# Patient Record
Sex: FEMALE | Race: BLACK OR AFRICAN AMERICAN | Employment: OTHER | URBAN - METROPOLITAN AREA
[De-identification: names, ages, dates, MRNs, and addresses within clinical notes are randomized per-mention and may not be internally consistent; named-entity substitution may affect disease eponyms.]

---

## 2017-01-02 DIAGNOSIS — I10 ESSENTIAL HYPERTENSION WITH GOAL BLOOD PRESSURE LESS THAN 130/85: ICD-10-CM

## 2017-01-05 RX ORDER — HYDRALAZINE HYDROCHLORIDE 50 MG/1
TABLET, FILM COATED ORAL
Qty: 90 TAB | Refills: 0 | Status: SHIPPED | OUTPATIENT
Start: 2017-01-05 | End: 2017-02-02 | Stop reason: SDUPTHER

## 2017-01-26 ENCOUNTER — OFFICE VISIT (OUTPATIENT)
Dept: ORTHOPEDIC SURGERY | Age: 77
End: 2017-01-26

## 2017-01-26 VITALS
HEART RATE: 80 BPM | HEIGHT: 70 IN | BODY MASS INDEX: 27.92 KG/M2 | WEIGHT: 195 LBS | SYSTOLIC BLOOD PRESSURE: 161 MMHG | DIASTOLIC BLOOD PRESSURE: 71 MMHG

## 2017-01-26 DIAGNOSIS — G89.29 CHRONIC BILATERAL LOW BACK PAIN WITHOUT SCIATICA: ICD-10-CM

## 2017-01-26 DIAGNOSIS — M54.50 CHRONIC BILATERAL LOW BACK PAIN WITHOUT SCIATICA: ICD-10-CM

## 2017-01-26 DIAGNOSIS — M54.2 CERVICAL PAIN: ICD-10-CM

## 2017-01-26 DIAGNOSIS — M79.18 MYOFASCIAL PAIN: Primary | ICD-10-CM

## 2017-01-26 RX ORDER — MONTELUKAST SODIUM 10 MG/1
10 TABLET ORAL DAILY
COMMUNITY
End: 2017-09-29 | Stop reason: SDUPTHER

## 2017-01-26 RX ORDER — DICLOFENAC SODIUM 75 MG/1
75 TABLET, DELAYED RELEASE ORAL
Qty: 60 TAB | Refills: 5 | Status: SHIPPED | OUTPATIENT
Start: 2017-01-26 | End: 2019-02-19

## 2017-01-26 RX ORDER — RANITIDINE 150 MG/1
150 TABLET, FILM COATED ORAL 2 TIMES DAILY
COMMUNITY
End: 2020-05-15 | Stop reason: ALTCHOICE

## 2017-01-26 NOTE — PATIENT INSTRUCTIONS
Apiphany Activation    Thank you for requesting access to Apiphany. Please follow the instructions below to securely access and download your online medical record. Apiphany allows you to send messages to your doctor, view your test results, renew your prescriptions, schedule appointments, and more. How Do I Sign Up? 1. In your internet browser, go to [email or web address]  2. Click on the First Time User? Click Here link in the Sign In box. You will be redirect to the New Member Sign Up page. 3. Enter your Apiphany Access Code exactly as it appears below. You will not need to use this code after youve completed the sign-up process. If you do not sign up before the expiration date, you must request a new code. Apiphany Access Code: [email or web address]  Expires: 2017 10:06 AM (This is the date your Apiphany access code will )    4. Enter the last four digits of your Social Security Number (xxxx) and Date of Birth (mm/dd/yyyy) as indicated and click Submit. You will be taken to the next sign-up page. 5. Create a Apiphany ID. This will be your Apiphany login ID and cannot be changed, so think of one that is secure and easy to remember. 6. Create a Apiphany password. You can change your password at any time. 7. Enter your Password Reset Question and Answer. This can be used at a later time if you forget your password. 8. Enter your e-mail address. You will receive e-mail notification when new information is available in [address]. 9. Click Sign Up. You can now view and download portions of your medical record. 10. Click the Download Summary menu link to download a portable copy of your medical information. Additional Information    If you have questions, please visit the Frequently Asked Questions section of the Apiphany website at [email or web address] Remember, Apiphany is NOT to be used for urgent needs. For medical emergencies, dial 911.     What Is Myofascial Pain Syndrome? Myofascial pain syndrome is a chronic pain disorder that affects fascia (connective tissue that covers the muscles) and is characterized by muscle pain, tenderness, and spasm. The syndrome can involve a single muscle or a muscle group. Myofascial pain syndrome typically affects muscles in asymmetric areas of the body. The precise cause of the syndrome is unknown. As with most chronic pain conditions, associated symptoms may include poor sleep, fatigue, depression, and behavioral disturbances. In myofascial pain syndrome, there are focal tender points in muscles called trigger points. A trigger point is usually within a taut band of muscle that can be felt by the examiner. They can ultimately be identified by the examiner applying pressure with one to three fingers and the thumb. Patients usually exhibit a jump sign when a trigger point is pressed. The patient with a positive jump sign may wince, cry out, and withdraw from the pressure being applied by an examiner. The pain may be referred, or felt at a distance away from the area being compressed. Trigger points can occur in muscles, ligaments, fascia, and periosteum (the membrane surrounding a bone). Pain in trigger points can be made worse with activity or stress.  Currently, four types of trigger points can be distinguished:    -An active trigger point is an area of extreme tenderness usually within a taut muscle or muscle group  -A latent trigger point is an inactive area with the potential to act like a trigger point  -A secondary trigger point is a highly irritable area in a muscle or muscle group that can be activated due to a trigger point or overload in another muscle or muscle group  -A satellite trigger point is a highly irritable spot in a muscle or muscle group that becomes active because the muscle is in the region of another trigger point  Causes Of Myofascial Pain Syndrome    No one single factor can be identified as causing myofascial pain syndrome. The syndrome may develop from a muscle injury or excessive strain on a certain muscle or muscle group, ligament, or tendon. In addition, the following factors may be contributors:    Trauma to the discs between the backbones, or vertebrae  Inflammatory conditions  Lack of blood flow to heart muscle  Deconditioning from lack of exercise  General fatigue  Nutritional deficiencies  Hormonal changes  Obesity  Intense cooling of parts of the body  Use of tobacco  Repetitive motions  Overuse of a muscle  Alcohol or drug abuse  Long-term emotional stress  Treatments For Myofascial Pain Syndrome    Myofascial pain syndrome is best treated with a team of various medical professionals and various forms of therapies. The treatment team may consist of a primary care physician, a specialist in [address] [facility] , nurses, physical and occupational therapists, massage therapists, and psychologists. The therapies may be carried out through drug and non-drug means. Current practice is combining non-drug therapies with short-term drug therapies for longer lasting and maximal benefits. Various drugs can be used in the treatment of myofascial pain syndrome. Non-steroidal anti-inflammatory drugs (NSAIDs) such as aspirin (Emily), ibuprofen (Advil), and naproxen sodium (Aleve) can be used short term to reduce acute pain and inflammation. Acetaminophen (Tylenol) and oral narcotics such as oxycodone and hydrocodone may also be used in the short term for pain relief. Tricyclic antidepressants such as trazodone (Desyrel) and amitriptyline (Elavil) can be used at bedtime to improve sleep as well as relieve pain. Muscle relaxants such as cyclobenzaprine (Flexeril) and carisoprodol (Soma) can be used to relax muscle spasm and improve sleep, as they can have a sedating effect.     Antidepressants such as fluoxetine (Prozac), sertraline (Zoloft), and duloxetine (Cymbalta) can be helpful with the chronic pain of myofascial pain syndrome. Anti-seizure medications such as gabapentin (Neurontin) and pregabalin (Lyrica) can also be helpful with the chronic pain of the syndrome. Capsaicin cream, a topical pain reliever derived from chili peppers, may also be helpful with the chronic pain of myofascial pain syndrome. Injections can also be utilized in the treatment of myofascial pain syndrome. Trigger point injections and botulinum toxin (Botox) injections are two areas of recent interest. Trigger point injections involve direct injection of a local anesthetic into the trigger point. This method is very effective when there are only a few precisely located trigger points. Botox can also be directly injected into trigger points. This method has produced inconsistent results. Various non-drug therapies can be utilized in the treatment of myofascial pain syndrome. Physical therapy is one of the best treatments for the syndrome. The stretch and spray method has also been used with some success for treatment of the syndrome. It involves spraying the muscle or muscle group containing the trigger point with a coolant, such as flourimethane, and then slowly stretching the muscle. Massage therapy is another non-drug treatment used in the treatment of the syndrome. Massage therapists exert ischemic compression, which is the application of progressively stronger pressure on a trigger point for the purpose of eliminating its tenderness. Conclusion    Myofascial pain syndrome is a chronic pain disorder that affects muscle and the fascia covering the muscle. Key to the diagnosis and treatment of the syndrome is the identification of trigger points, which are areas of extreme tenderness in bands of taut muscle. No one knows the exact cause of the disorder. The treatment of myofascial pain syndrome is best done by a multidisciplinary team utilizing various drug and non-drug therapies.  The combination of physical therapy, trigger point injections, and massage are routinely used with some success in the treatment of myofascial pain syndrome.     [email or web address]

## 2017-01-26 NOTE — PROGRESS NOTES
[address], [address]  [place], [address] [place] [address] Street  Phone: [phone]  Fax: [phone]        Cristina Arzola  : 1940  PCP: Luis Sutherland MD  2017    NEW PATIENT      ASSESSMENT AND PLAN     Zeina Corbin comes in to the office today c/o aching 4/10 cervical and lumbar pain x 6 months. Pt states that her pain is particularly exacerbated with cold weather, sweeping, and vacuuming. While she does have diffuse arthritic changes in her cervical and lumbar regions, I believe her pain is due to postural imbalances causing stress on these areas. Pt lives in [facility] and will begin doing PT there for postural restoration. Pt was prescribed diclofenac 75mg BID prn. The risks, benefits, and potential side effects of this medication were discussed. Pt will f/u in 6 weeks or prn. Aníbal Jeffery was seen today for back pain and neck pain. Diagnoses and all orders for this visit:    Myofascial pain  -     diclofenac EC (VOLTAREN) 75 mg EC tablet; Take 1 Tab by mouth two (2) times daily as needed. -     REFERRAL TO PHYSICAL THERAPY    Cervical pain  -     [38652] C Spine 2-3V  -     diclofenac EC (VOLTAREN) 75 mg EC tablet; Take 1 Tab by mouth two (2) times daily as needed. -     REFERRAL TO PHYSICAL THERAPY    Chronic bilateral low back pain without sciatica  -     [86542] L/S 2-3 views  -     diclofenac EC (VOLTAREN) 75 mg EC tablet; Take 1 Tab by mouth two (2) times daily as needed. -     REFERRAL TO PHYSICAL THERAPY       Follow-up Disposition: Not on File    CHIEF COMPLAINT  Zeina Corbin is seen today in consultation at the request of Luis Sutherland MD for complaints of cervical and lumbar pain. HISTORY OF PRESENT ILLNESS  Zeina Corbin is a 67 y.o. female c/o aching 4/10 cervical and lumbar pain x 6 months. Pt states that her pain is particularly exacerbated with cold weather, sweeping, and vacuuming.  She has taken Tylenol and Aleve for her pain. Sitting, lying down, and bending forward relieve her pain. Pt denies any fevers, chills, nausea, vomiting. Pt denies any chest pain and shortness of breath. Pt denies any ear, nose, and throat problems. Pt denies any fecal or urinary incontinence. She is not working (retired in 2007). Pt lives in [place]. PAST MEDICAL HISTORY   Past Medical History   Diagnosis Date    Anxiety     Arthritis     GERD (gastroesophageal reflux disease)     Hypertension     Psychiatric disorder      anxiety d/o       Past Surgical History   Procedure Laterality Date    Hx gyn       hysterectomy    Hx orthopaedic       total left knee    Hx gyn       hysterectomy 1980    Hx orthopaedic       knee replacment 2007 left, 2014 right       MEDICATIONS      Current Outpatient Prescriptions   Medication Sig Dispense Refill    montelukast (SINGULAIR) 10 mg tablet Take 10 mg by mouth daily.  raNITIdine (ZANTAC) 150 mg tablet Take 150 mg by mouth two (2) times a day.  hydrALAZINE (APRESOLINE) 50 mg tablet TAKE 1 TABLET BY MOUTH THREE TIMES DAILY 90 Tab 0    losartan-hydroCHLOROthiazide (HYZAAR) 50-12.5 mg per tablet Take 1 Tab by mouth daily. 90 Tab 1    fexofenadine (ALLEGRA) 180 mg tablet TAKE 1 TABLET BY MOUTH DAILY 30 Tab 0    amLODIPine (NORVASC) 10 mg tablet Take 1 Tab by mouth daily. 90 Tab 1    LORazepam (ATIVAN) 0.5 mg tablet Take 1 Tab by mouth every eight (8) hours as needed for Anxiety for up to 10 doses. Max Daily Amount: 1.5 mg. 10 Tab 0    fluticasone (FLONASE) 50 mcg/actuation nasal spray 2 Sprays by Both Nostrils route daily. 1 Bottle 12    aspirin 81 mg chewable tablet Take 81 mg by mouth daily.  meloxicam (MOBIC) 15 mg tablet 1 tab by mouth daily with food 30 Tab 2    OTHER       esomeprazole (NEXIUM) 20 mg capsule Take  by mouth daily.          ALLERGIES    Allergies   Allergen Reactions    Hydrochlorothiazide Other (comments)       Pt denies allergy 1/6/2014 SOCIAL HISTORY    Social History     Social History    Marital status:      Spouse name: N/A    Number of children: N/A    Years of education: N/A     Social History Main Topics    Smoking status: Former Smoker     Years: 8.00     Quit date: 1/1/1990    Smokeless tobacco: Never Used    Alcohol use No    Drug use: No    Sexual activity: Not Currently     Other Topics Concern    None     Social History Narrative    ** Merged History Encounter **            FAMILY HISTORY    Family History   Problem Relation Age of Onset    Stroke Maternal Grandmother     Hypertension Mother     Stroke Mother     Hypertension Brother     Stroke Brother          REVIEW OF SYSTEMS  Review of Systems   Constitutional: Positive for chills, diaphoresis (night sweats) and fever. Negative for malaise/fatigue and weight loss. Respiratory: Negative for shortness of breath. Cardiovascular: Negative for chest pain and leg swelling. Gastrointestinal: Negative for constipation, nausea and vomiting. Neurological: Negative for dizziness, tingling, seizures, loss of consciousness and headaches. Psychiatric/Behavioral: The patient does not have insomnia. PHYSICAL EXAMINATION  Visit Vitals    /71    Pulse 80    Ht 5' 10\" (1.778 m)    Wt 195 lb (88.5 kg)    BMI 27.98 kg/m2         Pain Assessment  1/26/2017   Location of Pain Back;Neck   Location Modifiers -   Severity of Pain 0   Quality of Pain Aching   Quality of Pain Comment stgiffness   Frequency of Pain Intermittent   Aggravating Factors (No Data)   Aggravating Factors Comment inclement weather   Limiting Behavior Some   Result of Injury No         Constitutional:  Well developed, well nourished, in no acute distress. Psychiatric: Affect and mood are appropriate. HEENT: Normocephalic, atraumatic. Extraocular movements intact. Integumentary: No rashes or abrasions noted on exposed areas. Cardiovascular: Regular rate and rhythm. Pulmonary: Clear to auscultation bilaterally. SPINE/MUSCULOSKELETAL EXAM    Cervical spine:  Neck is midline. Normal muscle tone. No focal atrophy is noted. ROM pain free. Limited neck ROM. Shoulder ROM intact. No tenderness to palpation. Negative Spurling's sign. Negative Tinel's sign. Negative Castaneda's sign. Flexed forward posture. Sensation in the bilateral arms grossly intact to light touch. Lumbar spine:  No rash, ecchymosis, or gross obliquity. No fasciculations. No focal atrophy is noted. No pain with hip ROM. Full range of motion. No tenderness to palpation. No tenderness to palpation at the sciatic notch. SI joints non-tender. Trochanters non tender. No pain with back extension, flexion, twisting, or tilting. Sensation in the bilateral legs grossly intact to light touch. MOTOR:      Biceps  Triceps Deltoids Wrist Ext Wrist Flex Hand Intrin   Right 5/5 5/5 5/5 5/5 5/5 5/5   Left 5/5 5/5 5/5 5/5 5/5 5/5             Hip Flex  Quads Hamstrings Ankle DF EHL Ankle PF   Right 5/5 5/5 5/5 5/5 5/5 5/5   Left 5/5 5/5 5/5 5/5 5/5 5/5     DTRs are 2+ biceps, triceps, brachioradialis, patella, and Achilles. Negative Straight Leg raise. Squat not tested. No difficulty with tandem gait. Ambulation without assistive device. FWB. RADIOGRAPHS  Cervical XR images taken on 1/26/2017 personally reviewed with patient:  1) Severe degenerative changes  2) Significant osteophytes  3) No obvious fractures or instabilities    Lumbar XR images taken on 1/26/2017 personally reviewed with patient:  1) Severe facet sclerosis  2) No obvious fractures or instabilities      reviewed    Ms. Gage Garcia has a reminder for a \"due or due soon\" health maintenance. I have asked that she contact her primary care provider for follow-up on this health maintenance. This plan was reviewed with the patient and patient agrees. All questions were answered.  More than half of this visit today was spent on counseling. Written by Lenin Alberts, as dictated by Dr. Ailin Olvera. I, Dr. Ailin Olvera, confirm that all documentation is accurate.

## 2017-02-02 DIAGNOSIS — I10 ESSENTIAL HYPERTENSION WITH GOAL BLOOD PRESSURE LESS THAN 130/85: ICD-10-CM

## 2017-02-02 RX ORDER — HYDRALAZINE HYDROCHLORIDE 50 MG/1
TABLET, FILM COATED ORAL
Qty: 90 TAB | Refills: 0 | Status: SHIPPED | OUTPATIENT
Start: 2017-02-02 | End: 2017-02-15 | Stop reason: SDUPTHER

## 2017-02-15 ENCOUNTER — HOSPITAL ENCOUNTER (OUTPATIENT)
Dept: LAB | Age: 77
Discharge: HOME | End: 2017-02-15
Payer: MEDICARE

## 2017-02-15 ENCOUNTER — OFFICE VISIT (OUTPATIENT)
Dept: INTERNAL MEDICINE CLINIC | Age: 77
End: 2017-02-15

## 2017-02-15 VITALS
BODY MASS INDEX: 28.06 KG/M2 | SYSTOLIC BLOOD PRESSURE: 140 MMHG | WEIGHT: 196 LBS | TEMPERATURE: 97.6 F | OXYGEN SATURATION: 98 % | HEART RATE: 78 BPM | RESPIRATION RATE: 18 BRPM | DIASTOLIC BLOOD PRESSURE: 78 MMHG | HEIGHT: 70 IN

## 2017-02-15 DIAGNOSIS — F41.9 ANXIETY: ICD-10-CM

## 2017-02-15 DIAGNOSIS — J30.89 ENVIRONMENTAL AND SEASONAL ALLERGIES: ICD-10-CM

## 2017-02-15 DIAGNOSIS — I10 ESSENTIAL HYPERTENSION WITH GOAL BLOOD PRESSURE LESS THAN 130/85: Primary | ICD-10-CM

## 2017-02-15 DIAGNOSIS — Z00.00 MEDICARE ANNUAL WELLNESS VISIT, SUBSEQUENT: ICD-10-CM

## 2017-02-15 DIAGNOSIS — E55.9 VITAMIN D DEFICIENCY: ICD-10-CM

## 2017-02-15 PROCEDURE — 85027 COMPLETE CBC AUTOMATED: CPT

## 2017-02-15 PROCEDURE — 36415 COLL VENOUS BLD VENIPUNCTURE: CPT

## 2017-02-15 PROCEDURE — 80053 COMPREHEN METABOLIC PANEL: CPT

## 2017-02-15 PROCEDURE — 82306 VITAMIN D 25 HYDROXY: CPT

## 2017-02-15 RX ORDER — AMLODIPINE BESYLATE 10 MG/1
10 TABLET ORAL DAILY
Qty: 90 TAB | Refills: 1 | Status: SHIPPED | OUTPATIENT
Start: 2017-02-15 | End: 2017-03-31 | Stop reason: SDUPTHER

## 2017-02-15 RX ORDER — HYDRALAZINE HYDROCHLORIDE 50 MG/1
TABLET, FILM COATED ORAL
Qty: 90 TAB | Refills: 1 | Status: SHIPPED | OUTPATIENT
Start: 2017-02-15 | End: 2017-04-11 | Stop reason: SDUPTHER

## 2017-02-15 RX ORDER — MINERAL OIL
ENEMA (ML) RECTAL
Qty: 90 TAB | Refills: 3 | Status: SHIPPED | OUTPATIENT
Start: 2017-02-15 | End: 2017-09-29 | Stop reason: ALTCHOICE

## 2017-02-15 RX ORDER — LORAZEPAM 0.5 MG/1
0.5 TABLET ORAL
Qty: 20 TAB | Refills: 0 | Status: SHIPPED | OUTPATIENT
Start: 2017-02-15 | End: 2017-06-21 | Stop reason: SDUPTHER

## 2017-02-15 NOTE — LETTER
2/20/2017 4:16 PM 
 
Ms. Jarocho Valdivia [facility] [id number] Dear Jarocho Valdivia: 
 
Please find your most recent results below. Resulted Orders CBC W/O DIFF Result Value Ref Range WBC 4.3 3.4 - 10.8 x10E3/uL  
 RBC 4.38 3.77 - 5.28 x10E6/uL HGB 13.2 11.1 - 15.9 g/dL HCT 40.5 34.0 - 46.6 % MCV 93 79 - 97 fL  
 MCH 30.1 26.6 - 33.0 pg  
 MCHC 32.6 31.5 - 35.7 g/dL  
 RDW 13.1 12.3 - 15.4 % PLATELET 583 982 - 861 x10E3/uL Narrative Performed at:  [address]  [id number] : Nathalie Mansfield MD, Phone:  [phone] METABOLIC PANEL, COMPREHENSIVE Result Value Ref Range Glucose 82 65 - 99 mg/dL BUN 13 8 - 27 mg/dL Creatinine 0.79 0.57 - 1.00 mg/dL GFR est non-AA 72 >59 mL/min/1.73 GFR est AA 84 >59 mL/min/1.73  
 BUN/Creatinine ratio 16 11 - 26 Sodium 142 134 - 144 mmol/L Potassium 4.3 3.5 - 5.2 mmol/L Chloride 99 96 - 106 mmol/L  
 CO2 28 18 - 29 mmol/L Calcium 9.7 8.7 - 10.3 mg/dL Protein, total 6.9 6.0 - 8.5 g/dL Albumin 4.3 3.5 - 4.8 g/dL GLOBULIN, TOTAL 2.6 1.5 - 4.5 g/dL A-G Ratio 1.7 1.1 - 2.5 Comment: **Effective March 13, 2017 the reference interval** 
  for A/G Ratio will be changing to: Age                Male          Female 0 -  7 days       1.1 - 2.3       1.1 - 2.3 
          8 - 30 days       1.2 - 2.8       1.2 - 2.8 
          1 -  6 months     1.3 - 3.6       1.3 - 3.6 
   7 months -  5 years      1.5 - 2.6       1.5 - 2.6 
             > 5 years      1.2 - 2.2       1.2 - 2.2 Bilirubin, total 0.3 0.0 - 1.2 mg/dL Alk. phosphatase 66 39 - 117 IU/L  
 AST (SGOT) 20 0 - 40 IU/L  
 ALT (SGPT) 12 0 - 32 IU/L Narrative Performed at:  [address]  [id number] : Nathalie Mansfield MD, Phone:  [phone] VITAMIN D, 25 HYDROXY Result Value Ref Range VITAMIN D, 25-HYDROXY 25.9 (L) 30.0 - 100.0 ng/mL Comment:  
   Vitamin D deficiency has been defined by the [facility] practice guideline as a 
level of serum 25-OH vitamin D less than 20 ng/mL (1,2). The Endocrine Society went on to further define vitamin D 
insufficiency as a level between 21 and 29 ng/mL (2). 1. IOM ([place] of Medicine). 2010. Dietary reference 
   intakes for calcium and D. [facility]: The 
   [employer]. 2. Rishabh MF, Alejandro NC, Haven HUNT, et al. 
   Evaluation, treatment, and prevention of vitamin D 
   deficiency: an Endocrine Society clinical practice 
   guideline. JCEM. 2011 Jul; 96(7):1911-30. Narrative Performed at:  [address]  [id number] : Asael Hankins MD, Phone:  [phone] RECOMMENDATIONS: 
Vit d is low, please start taking over the counter Vit d 1,000 iu daily. We can recheck this at next follow up. Every thing else looks fine. Follow up as instructed. Please call me if you have any questions: [phone] Sincerely, Annie Chanel MD

## 2017-02-15 NOTE — PROGRESS NOTES
Chief Complaint   Patient presents with    Annual Wellness Visit     fasting medicare wellness           Eleni President is a 67 y.o. female and presents for Annual Medicare Wellness Visit. She has been doing well since last seen in the office. She is getting Allergy shots from [facility], feeling much better. Now scheduling PT for lower back. Assessment of cognitive impairment: Alert and oriented x 3. Depression Screen:   PHQ 2 / 9, over the last two weeks 9/2/2016   Little interest or pleasure in doing things Not at all   Feeling down, depressed or hopeless Not at all   Total Score PHQ 2 0       Fall Risk Assessment:    Fall Risk Assessment, last 12 mths 9/2/2016   Able to walk? Yes   Fall in past 12 months? No       Abuse Screen:   Abuse Screening Questionnaire 9/2/2016   Do you ever feel afraid of your partner? N   Are you in a relationship with someone who physically or mentally threatens you? N   Is it safe for you to go home? Y       Activities of Daily Living:  Self-care.    Requires assistance with: no ADLs  Patient handle his/her own medications  yes Use of pill box  no  Activities of Daily Living:   ADL Assessment 9/2/2016   Feeding yourself No Help Needed   Getting from bed to chair No Help Needed   Getting dressed No Help Needed   Bathing or showering No Help Needed   Walk across the room (includes cane/walker) No Help Needed   Using the telphone No Help Needed   Taking your medications No Help Needed   Preparing meals No Help Needed   Managing money (expenses/bills) No Help Needed   Moderately strenuous housework (laundry) No Help Needed   Shopping for personal items (toiletries/medicines) No Help Needed   Shopping for groceries No Help Needed   Driving No Help Needed   Climbing a flight of stairs No Help Needed   Getting to places beyond walking distances No Help Needed       Health Maintenance:  Daily Aspirin: yes  Bone Density: up to date  Glaucoma Screening: yes  Immunizations:    Tetanus: up to date. Influenza: up to date. Shingles: up to date. PPSV-23: up to date. Prevnar-13: got in 12/16. Cancer screening:    Cervical: not up to date - n/a. Breast: up to date. Colon: no need, was told no need to do anymore. Prostate:  N/a      Alcohol Risk Screen:   On any occasion during the past 3 months, have you had more than 3 drinks(female) or 4 drinks (male) containing alcohol in one? No  Do you average more than 7 drinks (female) or 14 drinks (male) per week? No  Type and amount:none    Hearing Loss:  none    Vision Loss:   Wears glasses, contact lenses, or have any other visual impairment  yes    Adult Nutrition Screen:  No risk factors noted. Advance Care Planning:   End of Life Planning: has NO advanced directive  - add't info provided  Sarkis Gill ACP-Facilitator appointment yes      Medications/Allergies: Reviewed with patient  Prior to Admission medications    Medication Sig Start Date End Date Taking? Authorizing Provider   hydrALAZINE (APRESOLINE) 50 mg tablet TAKE 1 TABLET BY MOUTH THREE TIMES DAILY 2/2/17  Yes Concepción Rolle NP   montelukast (SINGULAIR) 10 mg tablet Take 10 mg by mouth daily. Yes Historical Provider   raNITIdine (ZANTAC) 150 mg tablet Take 150 mg by mouth two (2) times a day. Yes Historical Provider   diclofenac EC (VOLTAREN) 75 mg EC tablet Take 1 Tab by mouth two (2) times daily as needed. 1/26/17  Yes Levar Vogel MD   losartan-hydroCHLOROthiazide [facility]) 50-12.5 mg per tablet Take 1 Tab by mouth daily. 12/7/16  Yes Concepción Rolle NP   fexofenadine (ALLEGRA) 180 mg tablet TAKE 1 TABLET BY MOUTH DAILY 10/20/16  Yes Concepción Rolle NP   meloxicam (MOBIC) 15 mg tablet 1 tab by mouth daily with food 10/6/16  Yes Sameera Martinez MD   amLODIPine (NORVASC) 10 mg tablet Take 1 Tab by mouth daily. 9/27/16  Yes Lion Moe MD   LORazepam (ATIVAN) 0.5 mg tablet Take 1 Tab by mouth every eight (8) hours as needed for Anxiety for up to 10 doses. Max Daily Amount: 1.5 mg. 9/2/16  Yes Aaron Yancey MD   fluticasone (FLONASE) 50 mcg/actuation nasal spray 2 Sprays by Both Nostrils route daily. 2/19/16  Yes Mandie Jones NP   aspirin 81 mg chewable tablet Take 81 mg by mouth daily. Yes Historical Provider   esomeprazole (NEXIUM) 20 mg capsule Take  by mouth daily. Yes Historical Provider       Allergies   Allergen Reactions    Hydrochlorothiazide Other (comments)       Pt denies allergy 1/6/2014       Objective:  B.p 140/78  78 18 96.6    Problem List: Reviewed with patient and discussed risk factors. Patient Active Problem List   Diagnosis Code    OA (osteoarthritis) M19.90    Anxiety F41.9    HTN (hypertension) I10    Allergic rhinitis X06.0    Systolic murmur A02.9    Q waves suggestive of previous myocardial infarction R94.31    Essential hypertension I10    Arthritis M19.90    Gastroesophageal reflux disease without esophagitis K21.9    Anxiety disorder F41.9       PSH: Reviewed with patient  Past Surgical History   Procedure Laterality Date    Hx gyn       hysterectomy    Hx orthopaedic       total left knee    Hx gyn       hysterectomy 1980    Hx orthopaedic       knee replacment 2007 left, 2014 right        SH: Reviewed with patient  Social History   Substance Use Topics    Smoking status: Former Smoker     Years: 8.00     Quit date: 1/1/1990    Smokeless tobacco: Never Used    Alcohol use No       FH: Reviewed with patient  Family History   Problem Relation Age of Onset    Stroke Maternal Grandmother     Hypertension Mother     Stroke Mother     Hypertension Brother     Stroke Brother        Current medical providers:    Patient Care Team:  Aaron Yancey MD as PCP - General (Internal Medicine)  Gricelda Bowers MD as Consulting Provider (Orthopedic Surgery)  Kay Ortiz MD (Family Practice)    Plan: Brannon Moseley was seen today for annual wellness visit.     Diagnoses and all orders for this visit:    Medicare annual wellness visit, subsequent    Essential hypertension with goal blood pressure less than 130/85  -     hydrALAZINE (APRESOLINE) 50 mg tablet; TAKE 1 TABLET BY MOUTH THREE TIMES DAILY  -     amLODIPine (NORVASC) 10 mg tablet; Take 1 Tab by mouth daily.  -     CBC W/O DIFF  -     METABOLIC PANEL, COMPREHENSIVE    Anxiety  -     LORazepam (ATIVAN) 0.5 mg tablet; Take 1 Tab by mouth every eight (8) hours as needed for Anxiety for up to 20 doses. Max Daily Amount: 1.5 mg. Environmental and seasonal allergies  -     fexofenadine (ALLEGRA) 180 mg tablet; TAKE 1 TABLET BY MOUTH DAILY    Vitamin D deficiency  -     VITAMIN D, 25 HYDROXY        Health Maintenance   Topic Date Due    DTaP/Tdap/Td series (1 - Tdap) 04/02/1960    ZOSTER VACCINE AGE 60>  04/02/1999    GLAUCOMA SCREENING Q2Y  04/02/2004    Pneumococcal 65+ Low/Medium Risk (1 of 2 - PCV13) 04/02/2004    INFLUENZA AGE 9 TO ADULT  08/01/2016    MEDICARE YEARLY EXAM  03/16/2017    OSTEOPOROSIS SCREENING (DEXA)  Completed          Urinary/ Fecal Incontinence: None. Regular physical exercise: Can`t walk much due to low back pain. She will start walking daily after a physical therapy.

## 2017-02-16 LAB
25(OH)D3+25(OH)D2 SERPL-MCNC: 25.9 NG/ML (ref 30–100)
ALBUMIN SERPL-MCNC: 4.3 G/DL (ref 3.5–4.8)
ALBUMIN/GLOB SERPL: 1.7 {RATIO} (ref 1.1–2.5)
ALP SERPL-CCNC: 66 IU/L (ref 39–117)
ALT SERPL-CCNC: 12 IU/L (ref 0–32)
AST SERPL-CCNC: 20 IU/L (ref 0–40)
BILIRUB SERPL-MCNC: 0.3 MG/DL (ref 0–1.2)
BUN SERPL-MCNC: 13 MG/DL (ref 8–27)
BUN/CREAT SERPL: 16 (ref 11–26)
CALCIUM SERPL-MCNC: 9.7 MG/DL (ref 8.7–10.3)
CHLORIDE SERPL-SCNC: 99 MMOL/L (ref 96–106)
CO2 SERPL-SCNC: 28 MMOL/L (ref 18–29)
CREAT SERPL-MCNC: 0.79 MG/DL (ref 0.57–1)
ERYTHROCYTE [DISTWIDTH] IN BLOOD BY AUTOMATED COUNT: 13.1 % (ref 12.3–15.4)
GLOBULIN SER CALC-MCNC: 2.6 G/DL (ref 1.5–4.5)
GLUCOSE SERPL-MCNC: 82 MG/DL (ref 65–99)
HCT VFR BLD AUTO: 40.5 % (ref 34–46.6)
HGB BLD-MCNC: 13.2 G/DL (ref 11.1–15.9)
MCH RBC QN AUTO: 30.1 PG (ref 26.6–33)
MCHC RBC AUTO-ENTMCNC: 32.6 G/DL (ref 31.5–35.7)
MCV RBC AUTO: 93 FL (ref 79–97)
PLATELET # BLD AUTO: 238 X10E3/UL (ref 150–379)
POTASSIUM SERPL-SCNC: 4.3 MMOL/L (ref 3.5–5.2)
PROT SERPL-MCNC: 6.9 G/DL (ref 6–8.5)
RBC # BLD AUTO: 4.38 X10E6/UL (ref 3.77–5.28)
SODIUM SERPL-SCNC: 142 MMOL/L (ref 134–144)
WBC # BLD AUTO: 4.3 X10E3/UL (ref 3.4–10.8)

## 2017-03-04 DIAGNOSIS — J30.9 ALLERGIC RHINITIS: ICD-10-CM

## 2017-03-05 DIAGNOSIS — I10 ESSENTIAL HYPERTENSION WITH GOAL BLOOD PRESSURE LESS THAN 130/85: ICD-10-CM

## 2017-03-05 RX ORDER — HYDRALAZINE HYDROCHLORIDE 50 MG/1
TABLET, FILM COATED ORAL
Qty: 90 TAB | Refills: 0 | Status: SHIPPED | OUTPATIENT
Start: 2017-03-05 | End: 2017-06-21 | Stop reason: SDUPTHER

## 2017-03-05 RX ORDER — FLUTICASONE PROPIONATE 50 MCG
SPRAY, SUSPENSION (ML) NASAL
Qty: 1 BOTTLE | Refills: 2 | Status: SHIPPED | OUTPATIENT
Start: 2017-03-05 | End: 2017-06-23 | Stop reason: SDUPTHER

## 2017-03-31 DIAGNOSIS — I10 ESSENTIAL HYPERTENSION WITH GOAL BLOOD PRESSURE LESS THAN 130/85: ICD-10-CM

## 2017-03-31 RX ORDER — AMLODIPINE BESYLATE 10 MG/1
10 TABLET ORAL DAILY
Qty: 90 TAB | Refills: 1 | Status: SHIPPED | OUTPATIENT
Start: 2017-03-31 | End: 2017-09-29 | Stop reason: SDUPTHER

## 2017-04-11 DIAGNOSIS — I10 ESSENTIAL HYPERTENSION WITH GOAL BLOOD PRESSURE LESS THAN 130/85: ICD-10-CM

## 2017-04-11 RX ORDER — HYDRALAZINE HYDROCHLORIDE 50 MG/1
TABLET, FILM COATED ORAL
Qty: 90 TAB | Refills: 1 | Status: SHIPPED | OUTPATIENT
Start: 2017-04-11 | End: 2017-06-16 | Stop reason: SDUPTHER

## 2017-04-27 DIAGNOSIS — H10.13 ALLERGIC CONJUNCTIVITIS, BILATERAL: Primary | ICD-10-CM

## 2017-04-27 RX ORDER — KETOTIFEN FUMARATE 0.35 MG/ML
1 SOLUTION/ DROPS OPHTHALMIC 2 TIMES DAILY
Qty: 10 ML | Refills: 1 | Status: SHIPPED | OUTPATIENT
Start: 2017-04-27 | End: 2017-05-07

## 2017-05-02 ENCOUNTER — DOCUMENTATION ONLY (OUTPATIENT)
Dept: INTERNAL MEDICINE CLINIC | Age: 77
End: 2017-05-02

## 2017-06-05 RX ORDER — LOSARTAN POTASSIUM AND HYDROCHLOROTHIAZIDE 12.5; 5 MG/1; MG/1
TABLET ORAL
Qty: 90 TAB | Refills: 0 | Status: SHIPPED | OUTPATIENT
Start: 2017-06-05 | End: 2017-08-23 | Stop reason: SDUPTHER

## 2017-06-16 DIAGNOSIS — I10 ESSENTIAL HYPERTENSION WITH GOAL BLOOD PRESSURE LESS THAN 130/85: ICD-10-CM

## 2017-06-16 RX ORDER — HYDRALAZINE HYDROCHLORIDE 50 MG/1
TABLET, FILM COATED ORAL
Qty: 90 TAB | Refills: 0 | Status: SHIPPED | OUTPATIENT
Start: 2017-06-16 | End: 2018-01-23 | Stop reason: SDUPTHER

## 2017-06-21 DIAGNOSIS — I10 ESSENTIAL HYPERTENSION WITH GOAL BLOOD PRESSURE LESS THAN 130/85: ICD-10-CM

## 2017-06-21 DIAGNOSIS — F41.9 ANXIETY: ICD-10-CM

## 2017-06-23 DIAGNOSIS — F41.9 ANXIETY: ICD-10-CM

## 2017-06-23 DIAGNOSIS — J30.9 ALLERGIC RHINITIS: ICD-10-CM

## 2017-06-23 DIAGNOSIS — I10 ESSENTIAL HYPERTENSION WITH GOAL BLOOD PRESSURE LESS THAN 130/85: ICD-10-CM

## 2017-06-23 RX ORDER — HYDRALAZINE HYDROCHLORIDE 50 MG/1
TABLET, FILM COATED ORAL
Qty: 90 TAB | Refills: 0 | Status: SHIPPED | OUTPATIENT
Start: 2017-06-23 | End: 2017-09-29 | Stop reason: ALTCHOICE

## 2017-06-23 RX ORDER — LORAZEPAM 0.5 MG/1
0.5 TABLET ORAL
Qty: 20 TAB | Refills: 0 | Status: SHIPPED | OUTPATIENT
Start: 2017-06-23 | End: 2017-06-23 | Stop reason: SDUPTHER

## 2017-06-23 RX ORDER — LORAZEPAM 0.5 MG/1
TABLET ORAL
Qty: 20 TAB | Refills: 0 | Status: SHIPPED | OUTPATIENT
Start: 2017-06-23 | End: 2017-09-29 | Stop reason: SDUPTHER

## 2017-06-23 RX ORDER — HYDRALAZINE HYDROCHLORIDE 50 MG/1
TABLET, FILM COATED ORAL
Qty: 270 TAB | Refills: 1 | Status: SHIPPED | OUTPATIENT
Start: 2017-06-23 | End: 2017-09-29 | Stop reason: ALTCHOICE

## 2017-06-23 RX ORDER — FLUTICASONE PROPIONATE 50 MCG
SPRAY, SUSPENSION (ML) NASAL
Qty: 1 BOTTLE | Refills: 2 | Status: SHIPPED | OUTPATIENT
Start: 2017-06-23 | End: 2017-09-07 | Stop reason: SDUPTHER

## 2017-08-24 RX ORDER — LOSARTAN POTASSIUM AND HYDROCHLOROTHIAZIDE 12.5; 5 MG/1; MG/1
TABLET ORAL
Qty: 90 TAB | Refills: 0 | Status: SHIPPED | OUTPATIENT
Start: 2017-08-24 | End: 2017-11-18 | Stop reason: SDUPTHER

## 2017-09-07 DIAGNOSIS — J30.9 ALLERGIC RHINITIS, UNSPECIFIED ALLERGIC RHINITIS TRIGGER, UNSPECIFIED RHINITIS SEASONALITY: ICD-10-CM

## 2017-09-08 RX ORDER — FLUTICASONE PROPIONATE 50 MCG
SPRAY, SUSPENSION (ML) NASAL
Qty: 1 BOTTLE | Refills: 2 | Status: SHIPPED | OUTPATIENT
Start: 2017-09-08 | End: 2017-11-29 | Stop reason: SDUPTHER

## 2017-09-22 DIAGNOSIS — I10 ESSENTIAL HYPERTENSION WITH GOAL BLOOD PRESSURE LESS THAN 130/85: ICD-10-CM

## 2017-09-22 RX ORDER — AMLODIPINE BESYLATE 10 MG/1
TABLET ORAL
Qty: 90 TAB | Refills: 0 | Status: SHIPPED | OUTPATIENT
Start: 2017-09-22 | End: 2017-09-29 | Stop reason: ALTCHOICE

## 2017-09-29 ENCOUNTER — OFFICE VISIT (OUTPATIENT)
Dept: INTERNAL MEDICINE CLINIC | Age: 77
End: 2017-09-29

## 2017-09-29 ENCOUNTER — HOSPITAL ENCOUNTER (OUTPATIENT)
Dept: LAB | Age: 77
Discharge: HOME | End: 2017-09-29
Payer: MEDICARE

## 2017-09-29 VITALS
DIASTOLIC BLOOD PRESSURE: 80 MMHG | TEMPERATURE: 98 F | OXYGEN SATURATION: 96 % | HEART RATE: 90 BPM | BODY MASS INDEX: 28.15 KG/M2 | SYSTOLIC BLOOD PRESSURE: 140 MMHG | RESPIRATION RATE: 15 BRPM | WEIGHT: 196.6 LBS | HEIGHT: 70 IN

## 2017-09-29 DIAGNOSIS — I10 ESSENTIAL HYPERTENSION WITH GOAL BLOOD PRESSURE LESS THAN 130/85: ICD-10-CM

## 2017-09-29 DIAGNOSIS — F41.9 ANXIETY: ICD-10-CM

## 2017-09-29 DIAGNOSIS — J98.01 BRONCHOSPASM: ICD-10-CM

## 2017-09-29 DIAGNOSIS — M54.50 ACUTE MIDLINE LOW BACK PAIN WITHOUT SCIATICA: Primary | ICD-10-CM

## 2017-09-29 DIAGNOSIS — J30.89 ENVIRONMENTAL AND SEASONAL ALLERGIES: ICD-10-CM

## 2017-09-29 LAB
BILIRUB UR QL STRIP: NEGATIVE
GLUCOSE UR-MCNC: NEGATIVE MG/DL
KETONES P FAST UR STRIP-MCNC: NEGATIVE MG/DL
PH UR STRIP: 7 [PH] (ref 4.6–8)
PROT UR QL STRIP: NEGATIVE MG/DL
SP GR UR STRIP: 1.01 (ref 1–1.03)
UA UROBILINOGEN AMB POC: NORMAL (ref 0.2–1)
URINALYSIS CLARITY POC: CLEAR
URINALYSIS COLOR POC: YELLOW
URINE BLOOD POC: NEGATIVE
URINE LEUKOCYTES POC: NORMAL
URINE NITRITES POC: NEGATIVE

## 2017-09-29 PROCEDURE — 87086 URINE CULTURE/COLONY COUNT: CPT

## 2017-09-29 RX ORDER — LORAZEPAM 0.5 MG/1
TABLET ORAL
Qty: 20 TAB | Refills: 0 | Status: SHIPPED | OUTPATIENT
Start: 2017-09-29 | End: 2017-12-12 | Stop reason: SDUPTHER

## 2017-09-29 RX ORDER — AMLODIPINE BESYLATE 10 MG/1
10 TABLET ORAL DAILY
Qty: 90 TAB | Refills: 1 | Status: SHIPPED | OUTPATIENT
Start: 2017-09-29 | End: 2018-12-13 | Stop reason: SDUPTHER

## 2017-09-29 RX ORDER — MONTELUKAST SODIUM 10 MG/1
10 TABLET ORAL DAILY
Qty: 30 TAB | Refills: 0 | Status: SHIPPED | OUTPATIENT
Start: 2017-09-29 | End: 2017-10-27 | Stop reason: SDUPTHER

## 2017-09-29 RX ORDER — ALBUTEROL SULFATE 90 UG/1
1 AEROSOL, METERED RESPIRATORY (INHALATION)
Qty: 1 INHALER | Refills: 0 | Status: SHIPPED | OUTPATIENT
Start: 2017-09-29 | End: 2017-10-22 | Stop reason: SDUPTHER

## 2017-09-29 NOTE — PROGRESS NOTES
Chief Complaint   Patient presents with    Allergies     c/o earache, sore throat     Back Pain     lower back pain that has been occuring for about the last week   Pt here for allergy symptoms has been having a runny nose, ear pain, a sore throat with some coughing. She also states that she has been having some back pain and would like to get checked for a UTI since she had some concern that it could be that. Visit Vitals    /80 (BP 1 Location: Left arm, BP Patient Position: Sitting)    Pulse 90    Temp 98 °F (36.7 °C) (Oral)    Resp 15    Ht 5' 10\" (1.778 m)    Wt 196 lb 9.6 oz (89.2 kg)    SpO2 96%    BMI 28.21 kg/m2      1. Have you been to the ER, urgent care clinic since your last visit? Hospitalized since your last visit? No    2. Have you seen or consulted any other health care providers outside of the [address] [place] [address] since your last visit? Include any pap smears or colon screening.  No

## 2017-09-29 NOTE — LETTER
10/2/2017 2:34 PM 
 
Ms. Marty Acosta [facility] [id number] Dear Marty Acosta: 
 
Please find your most recent results below. Resulted Orders AMB POC URINALYSIS DIP STICK AUTO W/O MICRO Result Value Ref Range Color (UA POC) Yellow Clarity (UA POC) Clear Glucose (UA POC) Negative Negative Bilirubin (UA POC) Negative Negative Ketones (UA POC) Negative Negative Specific gravity (UA POC) 1.015 1.001 - 1.035 Blood (UA POC) Negative Negative pH (UA POC) 7.0 4.6 - 8.0 Protein (UA POC) Negative Negative mg/dL Urobilinogen (UA POC) 0.2 mg/dL 0.2 - 1 Nitrites (UA POC) Negative Negative Leukocyte esterase (UA POC) Trace Negative CULTURE, URINE Result Value Ref Range Urine Culture, Routine Mixed urogenital los Less than 10,000 colonies/mL Narrative Performed at:  [address]  [id number] : Isha Caldwell MD, Phone:  [phone] RECOMMENDATIONS: 
Negative culture. NO urinary infection. Please call me if you have any questions: [phone] Sincerely, Yates Mcburney, MD

## 2017-09-29 NOTE — PROGRESS NOTES
Written by Sonam Quintana, as dictated by Dr. Merryl Bence, MD.    Odette Villalpando is a 65 y.o. female. HPI  The patient comes in today to discuss allergies, which she has experienced since moving to [place] 3 years ago. She is allergic to ragweed. She used to follow with Dr. Duran (allergy) but she stopped going, as she was going twice per week and did not want to do that long-term. She takes Allegra every day for her allergies but no longer takes Singulair. She has been eating 2 tbsp of honey to help with her allergies, which she thinks has been helping. She has been experiencing lower back pain and was experiencing burning with urination recently, though that sensation has since subsided. In the past when she had a UTI it started as lower back pain, so she would like to have her urine checked today. Her BP is elevated in the office today at 140/80. She has been checking at home and says it has been running 135-140. She is compliant on Norvasc in the morning, hydralazine TID, and losartan-hctz. Patient Active Problem List   Diagnosis Code    OA (osteoarthritis) M19.90    Anxiety F41.9    HTN (hypertension) I10    Allergic rhinitis Z16.3    Systolic murmur U82.8    Q waves suggestive of previous myocardial infarction R94.31    Essential hypertension I10    Arthritis M19.90    Gastroesophageal reflux disease without esophagitis K21.9    Anxiety disorder F41.9        Current Outpatient Prescriptions on File Prior to Visit   Medication Sig Dispense Refill    fluticasone (FLONASE) 50 mcg/actuation nasal spray INSTILL 2 SPRAYS IN EACH NOSTRIL EVERY DAY 1 Bottle 2    losartan-hydroCHLOROthiazide (HYZAAR) 50-12.5 mg per tablet TAKE 1 TABLET BY MOUTH DAILY 90 Tab 0    hydrALAZINE (APRESOLINE) 50 mg tablet TAKE 1 TABLET BY MOUTH THREE TIMES DAILY 90 Tab 0    raNITIdine (ZANTAC) 150 mg tablet Take 150 mg by mouth two (2) times a day.       diclofenac EC (VOLTAREN) 75 mg EC tablet Take 1 Tab by mouth two (2) times daily as needed. 60 Tab 5    aspirin 81 mg chewable tablet Take 81 mg by mouth daily. No current facility-administered medications on file prior to visit. Allergies   Allergen Reactions    Hydrochlorothiazide Other (comments)       Pt denies allergy 1/6/2014       Past Medical History:   Diagnosis Date    Anxiety     Arthritis     GERD (gastroesophageal reflux disease)     Hypertension     Psychiatric disorder     anxiety d/o       Past Surgical History:   Procedure Laterality Date    HX GYN      hysterectomy    HX GYN      hysterectomy 1980    HX ORTHOPAEDIC      total left knee    HX ORTHOPAEDIC      knee replacment 2007 left, 2014 right       Family History   Problem Relation Age of Onset    Stroke Maternal Grandmother     Hypertension Mother     Stroke Mother     Hypertension Brother     Stroke Brother        Social History     Social History    Marital status: UNKNOWN     Spouse name: N/A    Number of children: N/A    Years of education: N/A     Occupational History    Not on file.      Social History Main Topics    Smoking status: Former Smoker     Years: 8.00     Quit date: 1/1/1990    Smokeless tobacco: Never Used    Alcohol use No    Drug use: No    Sexual activity: Not Currently     Other Topics Concern    Not on file     Social History Narrative    ** Merged History Encounter **            Office Visit on 09/29/2017   Component Date Value Ref Range Status    Color (UA POC) 09/29/2017 Yellow   Final    Clarity (UA POC) 09/29/2017 Clear   Final    Glucose (UA POC) 09/29/2017 Negative  Negative Final    Bilirubin (UA POC) 09/29/2017 Negative  Negative Final    Ketones (UA POC) 09/29/2017 Negative  Negative Final    Specific gravity (UA POC) 09/29/2017 1.015  1.001 - 1.035 Final    Blood (UA POC) 09/29/2017 Negative  Negative Final    pH (UA POC) 09/29/2017 7.0  4.6 - 8.0 Final    Protein (UA POC) 09/29/2017 Negative  Negative mg/dL Final    Urobilinogen (UA POC) 09/29/2017 0.2 mg/dL  0.2 - 1 Final    Nitrites (UA POC) 09/29/2017 Negative  Negative Final    Leukocyte esterase (UA POC) 09/29/2017 Trace  Negative Final       Review of Systems   Constitutional: Negative for malaise/fatigue. HENT: Positive for congestion. Respiratory: Negative for cough and shortness of breath. Musculoskeletal: Negative for joint pain and myalgias. Neurological: Negative for weakness. Endo/Heme/Allergies: Positive for environmental allergies. Psychiatric/Behavioral: Negative for depression, memory loss and substance abuse. The patient is nervous/anxious. Visit Vitals    /80 (BP 1 Location: Left arm, BP Patient Position: Sitting)    Pulse 90    Temp 98 °F (36.7 °C) (Oral)    Resp 15    Ht 5' 10\" (1.778 m)    Wt 196 lb 9.6 oz (89.2 kg)    SpO2 96%    BMI 28.21 kg/m2       Physical Exam   Constitutional: She is oriented to person, place, and time. She appears well-developed and well-nourished. No distress. HENT:   Right Ear: External ear normal.   Left Ear: External ear normal.   Eyes: Conjunctivae and EOM are normal. Right eye exhibits no discharge. Left eye exhibits no discharge. Neck: Normal range of motion. Neck supple. Cardiovascular: Normal rate and regular rhythm. Pulmonary/Chest: Effort normal and breath sounds normal. She has no wheezes. Scattered rhonchi and decreased breath sounds on R lower lobe   Abdominal: Soft. Bowel sounds are normal. There is no tenderness. Lymphadenopathy:     She has no cervical adenopathy. Neurological: She is alert and oriented to person, place, and time. Skin: She is not diaphoretic. Psychiatric: She has a normal mood and affect. Her behavior is normal.   Nursing note and vitals reviewed. ASSESSMENT and PLAN    ICD-10-CM ICD-9-CM    1.  Acute midline low back pain without sciatica M54.5 724.2 AMB POC URINALYSIS DIP STICK AUTO W/O MICRO CULTURE, URINE    UA performed in office, trace leukocytes. She is not having any burning with urination or frequency, so will send out for urine culture before starting on any abx.   2. Essential hypertension with goal blood pressure less than 130/85 I10 401.9 amLODIPine (NORVASC) 10 mg tablet sent to pharmacy    Norvasc refilled. If her BP goes above 140/80 she should let me know. 3. Anxiety F41.9 300.00 LORazepam (ATIVAN) 0.5 mg tablet script given to patient    Ativan given to use prn.   4. Environmental and seasonal allergies J30.89 477.8 montelukast (SINGULAIR) 10 mg tablet sent to pharmacy      albuterol (PROVENTIL HFA, VENTOLIN HFA, PROAIR HFA) 90 mcg/actuation inhaler sent to pharmacy   5. Bronchospasm J98.01 519.11 albuterol (PROVENTIL HFA, VENTOLIN HFA, PROAIR HFA) 90 mcg/actuation inhaler sent to pharmacy    I want her to start on Singulair and change from Allegra to Zyrtec. Albuterol inhaler given to use in the morning and evening. If she does not feel better by next week she should let me know. She should cut down to 1 tbsp of honey to keep her BS from getting too high. This plan was reviewed with the patient and patient agrees. All questions were answered. This scribe documentation was reviewed by me and accurately reflects the examination and decisions made by me. This note will not be viewable in [address]

## 2017-09-29 NOTE — MR AVS SNAPSHOT
Visit Information Date & Time Provider Department Dept. Phone Encounter #  
 9/29/2017 11:45 AM Amy Cooper MD [facility] Internal Medicine [phone] [id number] Your Appointments 2/23/2018  8:45 AM  
Medicare Physical with Amy Cooper MD  
[facility] Internal Medicine [address]) Appt Note: [facility] Wellness   
 [facility] [address] [facility] [address]  
[address] [facility] [address] [place] [id number] Upcoming Health Maintenance Date Due Pneumococcal 65+ Low/Medium Risk (1 of 2 - PCV13) 4/2/2004 MEDICARE YEARLY EXAM 3/16/2017 INFLUENZA AGE 9 TO ADULT 8/1/2017 GLAUCOMA SCREENING Q2Y 3/17/2018 DTaP/Tdap/Td series (2 - Td) 12/16/2025 Allergies as of 9/29/2017  Review Complete On: 9/29/2017 By: Amy Cooper MD  
  
 Severity Noted Reaction Type Reactions Hydrochlorothiazide  06/25/2013    Other (comments) Pt denies allergy 1/6/2014 Current Immunizations  Never Reviewed Name Date Influenza High Dose Vaccine PF 11/4/2015 Not reviewed this visit You Were Diagnosed With   
  
 Codes Comments Acute midline low back pain without sciatica    -  Primary ICD-10-CM: M54.5 ICD-9-CM: 724.2 Essential hypertension with goal blood pressure less than 130/85     ICD-10-CM: I10 
ICD-9-CM: 401.9 Anxiety     ICD-10-CM: F41.9 ICD-9-CM: 300.00 Environmental and seasonal allergies     ICD-10-CM: J30.89 ICD-9-CM: 477.8 Bronchospasm     ICD-10-CM: J98.01 
ICD-9-CM: 519.11 Vitals BP Pulse Temp Resp Height(growth percentile) Weight(growth percentile) 140/80 (BP 1 Location: Left arm, BP Patient Position: Sitting) 90 98 °F (36.7 °C) (Oral) 15 5' 10\" (1.778 m) 196 lb 9.6 oz (89.2 kg) SpO2 BMI OB Status Smoking Status 96% 28.21 kg/m2 Hysterectomy Former Smoker BMI and BSA Data  Body Mass Index Body Surface Area  
 28.21 kg/m 2 2.1 m 2  
  
  
 Preferred Pharmacy Pharmacy Name Phone [facility], [address] [place] [address] [phone] Your Updated Medication List  
  
   
This list is accurate as of: 9/29/17 12:16 PM.  Always use your most recent med list.  
  
  
  
  
 albuterol 90 mcg/actuation inhaler Commonly known as:  PROVENTIL HFA, VENTOLIN HFA, PROAIR HFA Take 1 Puff by inhalation every six (6) hours as needed for Wheezing for up to 10 days. amLODIPine 10 mg tablet Commonly known as:  Laci Lute Take 1 Tab by mouth daily. aspirin 81 mg chewable tablet Take 81 mg by mouth daily. diclofenac EC 75 mg EC tablet Commonly known as:  VOLTAREN Take 1 Tab by mouth two (2) times daily as needed. fluticasone 50 mcg/actuation nasal spray Commonly known as:  Gricel Mealy INSTILL 2 SPRAYS IN EACH NOSTRIL EVERY DAY  
  
 hydrALAZINE 50 mg tablet Commonly known as:  APRESOLINE  
TAKE 1 TABLET BY MOUTH THREE TIMES DAILY LORazepam 0.5 mg tablet Commonly known as:  ATIVAN  
TAKE 1 TABLET EVERY 8 HOURS AS NEEDED FOR ANXIETY  
  
 losartan-hydroCHLOROthiazide 50-12.5 mg per tablet Commonly known as:  HYZAAR  
TAKE 1 TABLET BY MOUTH DAILY  
  
 montelukast 10 mg tablet Commonly known as:  SINGULAIR Take 1 Tab by mouth daily for 30 days. ZANTAC 150 mg tablet Generic drug:  raNITIdine Take 150 mg by mouth two (2) times a day. Prescriptions Printed Refills LORazepam (ATIVAN) 0.5 mg tablet 0 Sig: TAKE 1 TABLET EVERY 8 HOURS AS NEEDED FOR ANXIETY Class: Print Prescriptions Sent to Pharmacy Refills  
 amLODIPine (NORVASC) 10 mg tablet 1 Sig: Take 1 Tab by mouth daily. Class: Normal  
 Pharmacy: [facility] [address], [address] [facility] Neighbor of [address] [place] [address] Ph #: [phone]  Route: Oral  
 montelukast (SINGULAIR) 10 mg tablet 0  
 Sig: Take 1 Tab by mouth daily for 30 days. Class: Normal  
 Pharmacy: [facility] [address] [place] [address], [address] Ph #: [phone] Route: Oral  
 albuterol (PROVENTIL HFA, VENTOLIN HFA, PROAIR HFA) 90 mcg/actuation inhaler 0 Sig: Take 1 Puff by inhalation every six (6) hours as needed for Wheezing for up to 10 days. Class: Normal  
 Pharmacy: [facility] [address] [place] [address], [address] Ph #: [phone] Route: Inhalation We Performed the Following AMB POC URINALYSIS DIP STICK AUTO W/O MICRO [82275 CPT(R)] CULTURE, URINE [id number] CPT(R)] Introducing [facility]! Junito Seals introduces [employer] patient portal. Now you can access parts of your medical record, email your doctor's office, and request medication refills online. 1. In your internet browser, go to [email or web address] 2. Click on the First Time User? Click Here link in the Sign In box. You will see the New Member Sign Up page. 3. Enter your [employer] Access Code exactly as it appears below. You will not need to use this code after youve completed the sign-up process. If you do not sign up before the expiration date, you must request a new code. · [employer] Access Code: [email or web address] Expires: 12/28/2017 12:16 PM 
 
4. Enter the last four digits of your Social Security Number (xxxx) and Date of Birth (mm/dd/yyyy) as indicated and click Submit. You will be taken to the next sign-up page. 5. Create a mohchit ID. This will be your [employer] login ID and cannot be changed, so think of one that is secure and easy to remember. 6. Create a mohchit password. You can change your password at any time. 7. Enter your Password Reset Question and Answer. This can be used at a later time if you forget your password. 8. Enter your e-mail address. You will receive e-mail notification when new information is available in [address] 9. Click Sign Up. You can now view and download portions of your medical record. 10. Click the Download Summary menu link to download a portable copy of your medical information. If you have questions, please visit the Frequently Asked Questions section of the [employer] website. Remember, [employer] is NOT to be used for urgent needs. For medical emergencies, dial 911. Now available from your iPhone and Android! Please provide this summary of care documentation to your next provider. Your primary care clinician is listed as Deb Bruno. If you have any questions after today's visit, please call [phone].

## 2017-09-30 LAB — BACTERIA UR CULT: NORMAL

## 2017-10-26 DIAGNOSIS — I10 ESSENTIAL HYPERTENSION WITH GOAL BLOOD PRESSURE LESS THAN 130/85: ICD-10-CM

## 2017-10-26 RX ORDER — HYDRALAZINE HYDROCHLORIDE 50 MG/1
TABLET, FILM COATED ORAL
Qty: 270 TAB | Refills: 0 | Status: SHIPPED | OUTPATIENT
Start: 2017-10-26 | End: 2018-01-26 | Stop reason: SDUPTHER

## 2017-11-19 RX ORDER — LOSARTAN POTASSIUM AND HYDROCHLOROTHIAZIDE 12.5; 5 MG/1; MG/1
TABLET ORAL
Qty: 90 TAB | Refills: 0 | Status: SHIPPED | OUTPATIENT
Start: 2017-11-19 | End: 2018-02-17 | Stop reason: SDUPTHER

## 2017-11-29 RX ORDER — FLUTICASONE PROPIONATE 50 MCG
SPRAY, SUSPENSION (ML) NASAL
Qty: 1 BOTTLE | Refills: 2 | Status: SHIPPED | OUTPATIENT
Start: 2017-11-29 | End: 2018-03-13 | Stop reason: SDUPTHER

## 2017-12-12 ENCOUNTER — DOCUMENTATION ONLY (OUTPATIENT)
Dept: INTERNAL MEDICINE CLINIC | Age: 77
End: 2017-12-12

## 2017-12-12 DIAGNOSIS — F41.9 ANXIETY: ICD-10-CM

## 2017-12-12 RX ORDER — LORAZEPAM 0.5 MG/1
TABLET ORAL
Qty: 20 TAB | Refills: 0 | Status: SHIPPED | OUTPATIENT
Start: 2017-12-12 | End: 2018-02-23 | Stop reason: ALTCHOICE

## 2018-01-22 ENCOUNTER — TELEPHONE (OUTPATIENT)
Dept: ORTHOPEDIC SURGERY | Facility: CLINIC | Age: 78
End: 2018-01-22

## 2018-01-22 NOTE — TELEPHONE ENCOUNTER
Patient called in hospitals she moved 3 hours away and is set to see a new ortho doctor. She is requesting her records be faxed to [phone] to Dr. Luís Montero in [place].  Please advise patient at [phone]

## 2018-01-22 NOTE — TELEPHONE ENCOUNTER
Advised pt that she needs to fill out release. Blank Release of info form mailed to pt's home address.

## 2018-01-23 ENCOUNTER — OFFICE VISIT (OUTPATIENT)
Dept: INTERNAL MEDICINE CLINIC | Age: 78
End: 2018-01-23

## 2018-01-23 VITALS
TEMPERATURE: 99.1 F | RESPIRATION RATE: 18 BRPM | WEIGHT: 198 LBS | HEART RATE: 89 BPM | BODY MASS INDEX: 28.35 KG/M2 | OXYGEN SATURATION: 93 % | SYSTOLIC BLOOD PRESSURE: 138 MMHG | DIASTOLIC BLOOD PRESSURE: 78 MMHG | HEIGHT: 70 IN

## 2018-01-23 DIAGNOSIS — J40 BRONCHITIS: Primary | ICD-10-CM

## 2018-01-23 DIAGNOSIS — J01.00 SUBACUTE MAXILLARY SINUSITIS: ICD-10-CM

## 2018-01-23 DIAGNOSIS — H61.22 IMPACTED CERUMEN OF LEFT EAR: ICD-10-CM

## 2018-01-23 RX ORDER — FLUTICASONE PROPIONATE 50 MCG
2 SPRAY, SUSPENSION (ML) NASAL DAILY
Qty: 1 BOTTLE | Refills: 0 | Status: SHIPPED | OUTPATIENT
Start: 2018-01-23 | End: 2018-04-13 | Stop reason: SDUPTHER

## 2018-01-23 RX ORDER — AZITHROMYCIN 250 MG/1
250 TABLET, FILM COATED ORAL SEE ADMIN INSTRUCTIONS
Qty: 6 TAB | Refills: 0 | Status: SHIPPED | OUTPATIENT
Start: 2018-01-23 | End: 2018-01-28

## 2018-01-23 RX ORDER — BENZONATATE 200 MG/1
200 CAPSULE ORAL
Qty: 21 CAP | Refills: 0 | Status: SHIPPED | OUTPATIENT
Start: 2018-01-23 | End: 2018-04-02 | Stop reason: SDUPTHER

## 2018-01-23 NOTE — PROGRESS NOTES
Chief Complaint   Patient presents with    Sinus Pain     started with congestion, scratchy throat, sweats, and fluid in ears for approx 7 days, stated went away for a few days then came back. Has had a lot of stress over past week. Has had 4 close friends and one family member pass in just a week. 1. Have you been to the ER, urgent care clinic since your last visit? Hospitalized since your last visit? NO    2. Have you seen or consulted any other health care providers outside of the [address] [place] [address] since your last visit? Include any pap smears or colon screening. NO

## 2018-01-23 NOTE — PROGRESS NOTES
Written by Jeanne Bradford, as dictated by Dr. Lyn Fitzpatrick MD.    Lesley Bucio is a 65 y.o. female. HPI  The patient comes in today c/o sinus drainage, fever (99.1 F today), sore throat, earache, and productive cough today x 7 days. She was in contact with her great-grandson, who has a cold. She has been using her inhaler and has been eating chicken soup and OJ. She has had several family members and friends pass away in the last few weeks, so she has been feeling down lately. She is up to date on her flu shot and Tdap. Patient Active Problem List   Diagnosis Code    OA (osteoarthritis) M19.90    Anxiety F41.9    HTN (hypertension) I10    Allergic rhinitis A89.0    Systolic murmur A29.5    Q waves suggestive of previous myocardial infarction R94.31    Essential hypertension I10    Arthritis M19.90    Gastroesophageal reflux disease without esophagitis K21.9    Anxiety disorder F41.9        Current Outpatient Prescriptions on File Prior to Visit   Medication Sig Dispense Refill    LORazepam (ATIVAN) 0.5 mg tablet TAKE 1 TABLET BY MOUTH EVERY 8 HOURS AS NEEDED FOR ANXIETY 20 Tab 0    fluticasone (FLONASE) 50 mcg/actuation nasal spray INSTILL 2 SPRAYS IN EACH NOSTRIL EVERY DAY 1 Bottle 2    losartan-hydroCHLOROthiazide (HYZAAR) 50-12.5 mg per tablet TAKE 1 TABLET BY MOUTH DAILY 90 Tab 0    montelukast (SINGULAIR) 10 mg tablet TAKE 1 TABLET BY MOUTH DAILY 90 Tab 0    hydrALAZINE (APRESOLINE) 50 mg tablet TAKE 1 TABLET BY MOUTH THREE TIMES DAILY 270 Tab 0    amLODIPine (NORVASC) 10 mg tablet Take 1 Tab by mouth daily. 90 Tab 1    raNITIdine (ZANTAC) 150 mg tablet Take 150 mg by mouth two (2) times a day.  diclofenac EC (VOLTAREN) 75 mg EC tablet Take 1 Tab by mouth two (2) times daily as needed. 60 Tab 5    aspirin 81 mg chewable tablet Take 81 mg by mouth daily. No current facility-administered medications on file prior to visit. Allergies   Allergen Reactions    Hydrochlorothiazide Other (comments)       Pt denies allergy 1/6/2014       Past Medical History:   Diagnosis Date    Anxiety     Arthritis     GERD (gastroesophageal reflux disease)     Hypertension     Psychiatric disorder     anxiety d/o       Past Surgical History:   Procedure Laterality Date    HX GYN      hysterectomy    HX GYN      hysterectomy 1980    HX ORTHOPAEDIC      total left knee    HX ORTHOPAEDIC      knee replacment 2007 left, 2014 right       Family History   Problem Relation Age of Onset    Stroke Maternal Grandmother     Hypertension Mother     Stroke Mother     Hypertension Brother     Stroke Brother        Social History     Social History    Marital status: UNKNOWN     Spouse name: N/A    Number of children: N/A    Years of education: N/A     Occupational History    Not on file. Social History Main Topics    Smoking status: Former Smoker     Years: 8.00     Quit date: 1/1/1990    Smokeless tobacco: Never Used    Alcohol use No    Drug use: No    Sexual activity: Not Currently     Other Topics Concern    Not on file                     Review of Systems   Constitutional: Negative for malaise/fatigue. HENT: Positive for congestion, ear pain and sore throat. Respiratory: Positive for cough and sputum production. Negative for shortness of breath. Musculoskeletal: Negative for joint pain and myalgias. Neurological: Negative for weakness. Psychiatric/Behavioral: Negative for depression, memory loss and substance abuse. Visit Vitals    /78    Pulse 89    Temp 99.1 °F (37.3 °C) (Oral)    Resp 18    Ht 5' 10\" (1.778 m)    Wt 198 lb (89.8 kg)    SpO2 93%    BMI 28.41 kg/m2       Physical Exam   Constitutional: She is oriented to person, place, and time. She appears well-developed and well-nourished. No distress.    HENT:   R ear canal erythema,  L ear canal cerumen impaction, maxillary sinus tenderness Eyes: Conjunctivae and EOM are normal. Right eye exhibits no discharge. Left eye exhibits no discharge. Neck: Normal range of motion. Neck supple. Cardiovascular: Normal rate and regular rhythm. Pulmonary/Chest: Effort normal. She has no wheezes. BL scattered rhonchi   Abdominal: Soft. Bowel sounds are normal. There is no tenderness. Lymphadenopathy:     She has no cervical adenopathy. Neurological: She is alert and oriented to person, place, and time. Skin: She is not diaphoretic. Psychiatric: She has a normal mood and affect. Her behavior is normal.   Nursing note and vitals reviewed. ASSESSMENT and PLAN    ICD-10-CM ICD-9-CM    1. Bronchitis J40 490 azithromycin (ZITHROMAX) 250 mg tablet sent to pharmacy   2. Subacute maxillary sinusitis J01.00 461.0 fluticasone (FLONASE) 50 mcg/actuation nasal spray sent to pharmacy    Z-pack and Flonase given. Nasacort OTC also recommended if Flonase is expensive. Tessalon given. 3. Impacted cerumen of left ear H61.22 380.4 REMOVE IMPACTED EAR WAX    Time out: Immediately prior to procedure a \"time out\" was called to verify the correct patient, procedure, equipment, support staff and site/side marked as required. Ear wash performed in office. Pt tolerated well. This plan was reviewed with the patient and patient agrees. All questions were answered. This scribe documentation was reviewed by me and accurately reflects the examination and decisions made by me. This note will not be viewable in [address]

## 2018-01-23 NOTE — MR AVS SNAPSHOT
[address] [facility] [address] 
[phone] Patient: Joaquin Shin MRN: [id number] WOR:[id number][id number] Visit Information Date & Time Provider Department Dept. Phone Encounter #  
 1/23/2018  1:30 PM Mickey Brooks, [address] [facility],[address] Internal Medicine [phone] [id number] Your Appointments 2/23/2018  8:45 AM  
Medicare Physical with Mickey Brooks MD  
[facility] Internal Medicine [address]) Appt Note: [facility]   
 [facility] [address] [facility] [address]  
[address] [facility] [address] [facility] [address] Upcoming Health Maintenance Date Due  
 MEDICARE YEARLY EXAM 3/16/2017 GLAUCOMA SCREENING Q2Y 3/17/2018 Pneumococcal 65+ Low/Medium Risk (2 of 2 - PPSV23) 10/27/2018 DTaP/Tdap/Td series (2 - Td) 12/16/2025 Allergies as of 1/23/2018  Review Complete On: 1/23/2018 By: Mickey Brooks MD  
  
 Severity Noted Reaction Type Reactions Hydrochlorothiazide  06/25/2013    Other (comments) Pt denies allergy 1/6/2014 Current Immunizations  Never Reviewed Name Date Influenza High Dose Vaccine PF 11/4/2015 Not reviewed this visit You Were Diagnosed With   
  
 Codes Comments Bronchitis    -  Primary ICD-10-CM: K43 ICD-9-CM: 025 Subacute maxillary sinusitis     ICD-10-CM: J01.00 ICD-9-CM: 461.0 Impacted cerumen of left ear     ICD-10-CM: H61.22 
ICD-9-CM: 380.4 Vitals BP Pulse Temp Resp Height(growth percentile) Weight(growth percentile) 138/78 89 99.1 °F (37.3 °C) (Oral) 18 5' 10\" (1.778 m) 198 lb (89.8 kg) SpO2 BMI OB Status Smoking Status 93% 28.41 kg/m2 Hysterectomy Former Smoker BMI and BSA Data Body Mass Index Body Surface Area  
 28.41 kg/m 2 2.11 m 2 Preferred Pharmacy Pharmacy Name Phone  [address] [facility] [address] [place] [address] [address] [facility] [address] [phone] Your Updated Medication List  
  
   
This list is accurate as of: 1/23/18  2:16 PM.  Always use your most recent med list. amLODIPine 10 mg tablet Commonly known as:  [place] Manual Take 1 Tab by mouth daily. aspirin 81 mg chewable tablet Take 81 mg by mouth daily. azithromycin 250 mg tablet Commonly known as:  Mariel Sick Take 1 Tab by mouth See Admin Instructions for 5 days. benzonatate 200 mg capsule Commonly known as:  TESSALON Take 1 Cap by mouth three (3) times daily as needed for Cough for up to 7 days. diclofenac EC 75 mg EC tablet Commonly known as:  VOLTAREN Take 1 Tab by mouth two (2) times daily as needed. * fluticasone 50 mcg/actuation nasal spray Commonly known as:  Marion Golds INSTILL 2 SPRAYS IN EACH NOSTRIL EVERY DAY  
  
 * fluticasone 50 mcg/actuation nasal spray Commonly known as:  Marion Golds 2 Sprays by Both Nostrils route daily for 10 days. hydrALAZINE 50 mg tablet Commonly known as:  APRESOLINE  
TAKE 1 TABLET BY MOUTH THREE TIMES DAILY LORazepam 0.5 mg tablet Commonly known as:  ATIVAN  
TAKE 1 TABLET BY MOUTH EVERY 8 HOURS AS NEEDED FOR ANXIETY  
  
 losartan-hydroCHLOROthiazide 50-12.5 mg per tablet Commonly known as:  HYZAAR  
TAKE 1 TABLET BY MOUTH DAILY  
  
 montelukast 10 mg tablet Commonly known as:  SINGULAIR  
TAKE 1 TABLET BY MOUTH DAILY  
  
 ZANTAC 150 mg tablet Generic drug:  raNITIdine Take 150 mg by mouth two (2) times a day. * Notice: This list has 2 medication(s) that are the same as other medications prescribed for you. Read the directions carefully, and ask your doctor or other care provider to review them with you. Prescriptions Sent to Pharmacy Refills  
 azithromycin (ZITHROMAX) 250 mg tablet 0 Sig: Take 1 Tab by mouth See Admin Instructions for 5 days.   
 Class: Normal  
 Pharmacy: [facility] [address] [place] [address] Ph #: [phone] Route: Oral  
 fluticasone (FLONASE) 50 mcg/actuation nasal spray 0 Si Sprays by Both Nostrils route daily for 10 days. Class: Normal  
 Pharmacy: [facility] [address] [place] [address], [address] [place] [address] Ph #: [phone] Route: Both Nostrils  
 benzonatate (TESSALON) 200 mg capsule 0 Sig: Take 1 Cap by mouth three (3) times daily as needed for Cough for up to 7 days. Class: Normal  
 Pharmacy: [facility] [address] [place] [address], [address] [place] [address] Ph #: [phone] Route: Oral  
  
We Performed the Following REMOVE IMPACTED EAR WAX [92559 CPT(R)] Introducing [facility] SERVICES! [employer] introduces [facility] patient portal. Now you can access parts of your medical record, email your doctor's office, and request medication refills online. 1. In your internet browser, go to [email or web address] 2. Click on the First Time User? Click Here link in the Sign In box. You will see the New Member Sign Up page. 3. Enter your [facility] Access Code exactly as it appears below. You will not need to use this code after youve completed the sign-up process. If you do not sign up before the expiration date, you must request a new code. · [facility] Access Code: [email or web address] Expires: 2018  2:16 PM 
 
4. Enter the last four digits of your Social Security Number (xxxx) and Date of Birth (mm/dd/yyyy) as indicated and click Submit. You will be taken to the next sign-up page. 5. Create a [employer] ID. This will be your [employer] login ID and cannot be changed, so think of one that is secure and easy to remember. 6. Create a [employer] password. You can change your password at any time. 7. Enter your Password Reset Question and Answer. This can be used at a later time if you forget your password. 8. Enter your e-mail address. You will receive e-mail notification when new information is available in [address] 9. Click Sign Up. You can now view and download portions of your medical record. 10. Click the Download Summary menu link to download a portable copy of your medical information. If you have questions, please visit the Frequently Asked Questions section of the Collections website. Remember, Collections is NOT to be used for urgent needs. For medical emergencies, dial 911. Now available from your iPhone and Android! Please provide this summary of care documentation to your next provider. Your primary care clinician is listed as Nakia Laura. If you have any questions after today's visit, please call [phone].

## 2018-01-26 DIAGNOSIS — I10 ESSENTIAL HYPERTENSION WITH GOAL BLOOD PRESSURE LESS THAN 130/85: ICD-10-CM

## 2018-01-26 RX ORDER — HYDRALAZINE HYDROCHLORIDE 50 MG/1
TABLET, FILM COATED ORAL
Qty: 270 TAB | Refills: 0 | Status: SHIPPED | OUTPATIENT
Start: 2018-01-26 | End: 2018-04-24 | Stop reason: SDUPTHER

## 2018-02-17 DIAGNOSIS — J30.89 ENVIRONMENTAL AND SEASONAL ALLERGIES: ICD-10-CM

## 2018-02-18 RX ORDER — LOSARTAN POTASSIUM AND HYDROCHLOROTHIAZIDE 12.5; 5 MG/1; MG/1
TABLET ORAL
Qty: 90 TAB | Refills: 0 | Status: SHIPPED | OUTPATIENT
Start: 2018-02-18 | End: 2018-05-18 | Stop reason: SDUPTHER

## 2018-02-18 RX ORDER — MONTELUKAST SODIUM 10 MG/1
TABLET ORAL
Qty: 90 TAB | Refills: 0 | Status: SHIPPED | OUTPATIENT
Start: 2018-02-18 | End: 2018-05-18 | Stop reason: SDUPTHER

## 2018-02-23 ENCOUNTER — HOSPITAL ENCOUNTER (OUTPATIENT)
Dept: LAB | Age: 78
Discharge: HOME | End: 2018-02-23
Payer: MEDICARE

## 2018-02-23 ENCOUNTER — OFFICE VISIT (OUTPATIENT)
Dept: INTERNAL MEDICINE CLINIC | Age: 78
End: 2018-02-23

## 2018-02-23 VITALS
SYSTOLIC BLOOD PRESSURE: 180 MMHG | WEIGHT: 199.2 LBS | OXYGEN SATURATION: 97 % | HEIGHT: 70 IN | DIASTOLIC BLOOD PRESSURE: 64 MMHG | TEMPERATURE: 98.2 F | HEART RATE: 86 BPM | BODY MASS INDEX: 28.52 KG/M2

## 2018-02-23 DIAGNOSIS — E55.9 VITAMIN D DEFICIENCY: ICD-10-CM

## 2018-02-23 DIAGNOSIS — F41.9 ANXIETY DISORDER, UNSPECIFIED TYPE: ICD-10-CM

## 2018-02-23 DIAGNOSIS — Z00.00 MEDICARE ANNUAL WELLNESS VISIT, SUBSEQUENT: Primary | ICD-10-CM

## 2018-02-23 DIAGNOSIS — I10 ESSENTIAL HYPERTENSION: ICD-10-CM

## 2018-02-23 DIAGNOSIS — Z71.89 ADVANCE CARE PLANNING: ICD-10-CM

## 2018-02-23 PROCEDURE — 85027 COMPLETE CBC AUTOMATED: CPT

## 2018-02-23 PROCEDURE — 80053 COMPREHEN METABOLIC PANEL: CPT

## 2018-02-23 PROCEDURE — 80061 LIPID PANEL: CPT

## 2018-02-23 PROCEDURE — 84443 ASSAY THYROID STIM HORMONE: CPT

## 2018-02-23 PROCEDURE — 82306 VITAMIN D 25 HYDROXY: CPT

## 2018-02-23 PROCEDURE — 36415 COLL VENOUS BLD VENIPUNCTURE: CPT

## 2018-02-23 RX ORDER — PAROXETINE 10 MG/1
10 TABLET, FILM COATED ORAL DAILY
Qty: 30 TAB | Refills: 2 | Status: SHIPPED | OUTPATIENT
Start: 2018-02-23 | End: 2018-02-23 | Stop reason: SDUPTHER

## 2018-02-23 RX ORDER — PAROXETINE 10 MG/1
TABLET, FILM COATED ORAL
Qty: 90 TAB | Refills: 2 | Status: SHIPPED | OUTPATIENT
Start: 2018-02-23 | End: 2019-02-11 | Stop reason: SINTOL

## 2018-02-23 RX ORDER — MELATONIN
1000 DAILY
Qty: 90 TAB | Refills: 0 | Status: SHIPPED | OUTPATIENT
Start: 2018-02-23 | End: 2018-05-24

## 2018-02-23 NOTE — PROGRESS NOTES
[facility] Medicare Wellness Visit      Andree Wells is a 65 y.o. female and presents for Annual Medicare Wellness Visit. Assessment of cognitive impairment: Alert and oriented x 4. Depression Screen:   PHQ over the last two weeks 2/23/2018   Little interest or pleasure in doing things Not at all   Feeling down, depressed or hopeless Not at all   Total Score PHQ 2 0       Fall Risk Assessment:    Fall Risk Assessment, last 12 mths 2/23/2018   Able to walk? Yes   Fall in past 12 months? No       Abuse Screen:   Abuse Screening Questionnaire 2/23/2018   Do you ever feel afraid of your partner? N   Are you in a relationship with someone who physically or mentally threatens you? N   Is it safe for you to go home? Y       Activities of Daily Living:  Self-care. Requires assistance with: no ADLs  Patient handle his/her own medications  yes Use of pill box  yes  Activities of Daily Living:   ADL Assessment 2/23/2018   Feeding yourself No Help Needed   Getting from bed to chair No Help Needed   Getting dressed No Help Needed   Bathing or showering No Help Needed   Walk across the room (includes cane/walker) No Help Needed   Using the telphone No Help Needed   Taking your medications No Help Needed   Preparing meals No Help Needed   Managing money (expenses/bills) No Help Needed   Moderately strenuous housework (laundry) No Help Needed   Shopping for personal items (toiletries/medicines) No Help Needed   Shopping for groceries No Help Needed   Driving Help Needed   Climbing a flight of stairs No Help Needed   Getting to places beyond walking distances Help Needed       Health Maintenance:  Daily Aspirin: yes  daily 81mg  Bone Density: up to date done 3/15/2016  Glaucoma Screening: yes next due 3/17/2018  Immunizations:    Tetanus: up to date. Next due 12/16/2025 Influenza: up to date. Shingles: up to date. 12/5/2016 PPSV-23: up to date. next due 10/27/2018 Prevnar-13: up to date.     Cancer screening:    Cervical: up to date.  Breast: up to date. States done at the [address] [facility] [place] 2016 Colon: up to date. States  she does not have to do again. Alcohol Risk Screen:   On any occasion during the past 3 months, have you had more than 3 drinks(female) or 4 drinks (male) containing alcohol in one? No  Do you average more than 7 drinks (female) or 14 drinks (male) per week? No  Type and amount:na    Hearing Loss:  Hearing is good. denies any hearing loss wears hearing aides    Vision Loss:   Wears glasses, for reading  yes    Adult Nutrition Screen:  No risk factors noted. Advance Care Planning:   End of Life Planning: has NO advanced directive  - add't info requested. Sarkis Freitas [address] ACP-Facilitator appointment no      Medications/Allergies: Reviewed with patient  Prior to Admission medications    Medication Sig Start Date End Date Taking? Authorizing Provider   ASPIRIN/SALICYLAMIDE/CAFFEINE (BC HEADACHE POWDER PO) Take  by mouth. Yes Historical Provider   losartan-hydroCHLOROthiazide (HYZAAR) 50-12.5 mg per tablet TAKE 1 TABLET BY MOUTH DAILY 2/18/18  Yes Arline Johnson MD   montelukast (SINGULAIR) 10 mg tablet TAKE 1 TABLET BY MOUTH DAILY 2/18/18  Yes Arline Johnson MD   hydrALAZINE (APRESOLINE) 50 mg tablet TAKE 1 TABLET BY MOUTH THREE TIMES DAILY 1/26/18  Yes Arline Johnson MD   LORazepam (ATIVAN) 0.5 mg tablet TAKE 1 TABLET BY MOUTH EVERY 8 HOURS AS NEEDED FOR ANXIETY 12/12/17  Yes Arline Johnson MD   fluticasone (FLONASE) 50 mcg/actuation nasal spray INSTILL 2 SPRAYS IN EACH NOSTRIL EVERY DAY 11/29/17  Yes Arlien Johnson MD   amLODIPine (NORVASC) 10 mg tablet Take 1 Tab by mouth daily. 9/29/17  Yes Arline Johnson MD   raNITIdine (ZANTAC) 150 mg tablet Take 150 mg by mouth two (2) times a day. Yes Historical Provider   diclofenac EC (VOLTAREN) 75 mg EC tablet Take 1 Tab by mouth two (2) times daily as needed. 1/26/17  Yes Nirali Mabry MD   aspirin 81 mg chewable tablet Take 81 mg by mouth daily. Yes Historical Provider       Allergies   Allergen Reactions    Hydrochlorothiazide Other (comments)       Pt denies allergy 1/6/2014       Objective:  Visit Vitals    /68 (BP 1 Location: Left arm, BP Patient Position: Sitting)    Pulse 86    Temp 98.2 °F (36.8 °C) (Oral)    Ht 5' 10\" (1.778 m)    Wt 199 lb 3.2 oz (90.4 kg)    SpO2 97%    BMI 28.58 kg/m2    Body mass index is 28.58 kg/(m^2). Problem List: Reviewed with patient and discussed risk factors. Patient Active Problem List   Diagnosis Code    OA (osteoarthritis) M19.90    Anxiety F41.9    HTN (hypertension) I10    Allergic rhinitis L64.7    Systolic murmur V47.1    Q waves suggestive of previous myocardial infarction R94.31    Essential hypertension I10    Arthritis M19.90    Gastroesophageal reflux disease without esophagitis K21.9    Anxiety disorder F41.9       PSH: Reviewed with patient  Past Surgical History:   Procedure Laterality Date    HX GYN      hysterectomy    HX GYN      hysterectomy 1980    HX ORTHOPAEDIC      total left knee    HX ORTHOPAEDIC      knee replacment 2007 left, 2014 right        SH: Reviewed with patient  Social History   Substance Use Topics    Smoking status: Former Smoker     Years: 8.00     Quit date: 1/1/1990    Smokeless tobacco: Never Used    Alcohol use No       FH: Reviewed with patient  Family History   Problem Relation Age of Onset    Stroke Maternal Grandmother     Hypertension Mother     Stroke Mother     Hypertension Brother     Stroke Brother        Current medical providers:    Patient Care Team:  Uche Campbell MD as PCP - General (Internal Medicine)  Prabhjot Abad MD as Consulting Provider (Orthopedic Surgery)      Plan:      Diagnoses and all orders for this visit:    1. Medicare annual wellness visit, subsequent  Immunization & Health screening reviewed with her.     2. Advance care planning   She will discuss with her daughter & make an appointment with [facility].        Orders Placed This Encounter    ASPIRIN/SALICYLAMIDE/CAFFEINE (BC HEADACHE POWDER PO)       Health Maintenance   Topic Date Due    GLAUCOMA SCREENING Q2Y  03/17/2018    Pneumococcal 65+ Low/Medium Risk (2 of 2 - PPSV23) 10/27/2018    MEDICARE YEARLY EXAM  02/24/2019    DTaP/Tdap/Td series (2 - Td) 12/16/2025    OSTEOPOROSIS SCREENING (DEXA)  Completed    ZOSTER VACCINE AGE 60>  Completed    Influenza Age 5 to Adult  Completed          Urinary/ Fecal Incontinence: no    Regular physical exercise: does walks around at home. Joining a senior care group in March where she will be doing different  exercises. Patient verbalized understanding of information presented. AVS and Medicare Part B Preventive Services Table printed and given to pt and reviewed. See table for findings under Recommendation and Scheduled. All of the patient's questions were answered. She is also seen for anxiety & follow up on chronic conditions. She has been having anxiety attacks lately & not able to sleep at night. Her friend & couple of family members passed away in last 2 months. She has been grieving & feels that she has taken too many Ativan lately. She is concerned she will get addictd to it. She is open to try long acting anti anxiety medication. Her Blood pressure reading is elevated today , she has not taken Hyzaar in the morning & thinks lack of sleep & anxiety is also a contributing factor. She is not having any vision problem but having headaches which she is describing as fullness behind her eyes. She has been having nasal congestion for last few day as well. She has stopped taking vitamin D , would like a prescription. Review of Systems   Constitutional: Negative for fever, chills    Eyes: Negative for blurred vision and discharge. Respiratory: Negative for cough, shortness of breath and wheezing. Cardiovascular: Negative for chest pain, palpitations and leg swelling.    Gastrointestinal: Negative for nausea and vomiting. Genitourinary: Negative for urgency and frequency. Musculoskeletal: Negative for myalgias and joint pain. Neurological: Negative for dizziness, sensory change, focal weakness and headaches. Physical Exam   Constitutional: She is oriented. She appears well-developed and well-nourished. No distress. HENT:   Mouth/Throat: nasal congestion + ,no ear discharge. Eyes: Conjunctivae and extraocular motions are normal. Pupils are equal, round, and reactive to light. Neck: Normal range of motion. Neck supple. Cardiovascular: Regular rhythm and normal heart sounds. Pulmonary/Chest: Breath sounds normal. No respiratory distress. She has no wheezes. Abdominal: Soft. Bowel sounds are normal. She exhibits no distension. Musculoskeletal: She exhibits no edema and no tenderness. Lymphadenopathy:     She has no cervical adenopathy. Neurological: She is oriented. She has normal reflexes. No cranial nerve deficit. Diagnoses and all orders for this visit:      1. Essential hypertension    Recommended taking blood pressure medication as soon as she gets home.  -     CBC W/O DIFF  -     METABOLIC PANEL, COMPREHENSIVE  -     LIPID PANEL  -     TSH 3RD GENERATION    2. Anxiety disorder, unspecified type  Paxil prescribed. Potential side effect discussed with her. 3. Vitamin D deficiency  -     VITAMIN D, 25 HYDROXY  -     cholecalciferol (VITAMIN D3) 1,000 unit tablet; Take 1 Tab by mouth daily for 90 days. 4. Nasal congestion : Recommended  Restarting Flonase & Zyrtec daily for 2 weeks. She has Flonase at home.

## 2018-02-23 NOTE — PATIENT INSTRUCTIONS
Medicare Part B Preventive Services Guidelines/Limitations Date last completed and Frequency 2/24/2019   Bone Mass Measurement  (age 72 & older, biennial) Requires diagnosis related to osteoporosis or estrogen deficiency. Biennial benefit unless patient has history of long-term glucocorticoid tx or baseline is needed because initial test was by other method, or for patients with vertebral abnormalities on x-ray Completed     Recommended every 2 years As recommended by your PCP or Specialist     Cardiovascular Screening Blood Tests (every 5 years)  Total cholesterol, HDL, Triglycerides Order as a panel if possible Completed     As recommended by your PCP As recommended by your PCP or Specialist   Hepatitis B vaccines  (covered for patients at high risk- hemophilia, ESRD, DM, body fluid contact Three shot series covered once         Zoster Shingles vaccine Covered by Medicare Part D through the pharmacy- PCP provides prescription Completed     Recommended once over age 48     Pneumococcal vaccine (once after age 72)  Completed   Recommended once over the age of 72 Next due 10/27/2018   Colorectal Cancer Screening  -Fecal occult blood test (annual)  -Flexible sigmoidoscopy (5y)  -Screening colonoscopy (10y)  -Barium Enema Age 49-80;  After age [de-identified] if history of abnormal results Completed      Recommended every 5 to 10 years  As recommended by your PCP or Specialist     Counseling to Prevent Tobacco Use (up to 8 sessions per year)  - Counseling greater than 3 and up to 10 minutes  - Counseling greater than 10 minutes Patients must be asymptomatic of tobacco-related conditions to receive as preventive service N/A N/A   Diabetes Screening Tests (at least every 3 years, Medicare covers annually or at 6-month intervals for prediabetic patients)    Fasting blood sugar (FBS) or glucose tolerance test (GTT) Patient must be diagnosed with one of the following:  -Hypertension, Dyslipidemia, obesity, previous impaired FBS or GTT  Or any two of the following: overweight, FH of diabetes, age ? 72, history of gestational diabetes, birth of baby weighing more than 9 pounds Completed     Recommended every 3 years for non-diabetics    Recommended every 3-6 months for Pre-Diabetics and Diabetics As recommended by your PCP or Specialist     Lung Cancer Screening-with low dose CT for patients who meet all criteria:  -Age 50-69  -either a current smoker or have quit in the past 15 yrs  -have a smoking history of 30 pack years or more  -have a written order from MD for screening Covered once every 12 months      Glaucoma Screening (no USPSTF recommendation) Covered for high risk patients such as patients with Diabetes mellitus, family history of glaucoma, , age 48 or over,  American, age 72 or over Completed     Recommended annually Due 3/17/2018   Seasonal Influenza Vaccination (annually)  Completed   Recommended Annually Due next fall   TDAP Vaccination Covered by Medicare part D through the pharmacy- PCP provides prescription Completed     Recommended every 10 years    Prevnar 15 vaccine  Prevnar 15 - Recommended once over the age of 72    Screening Mammography (biennial age 54-69) Annually (age 36 or over) Completed    As recommended by your PCP or Specialist     Screening Pap Tests and Pelvic Examination (up to age 79 and after 79 if unknown history or abnormal study last 8 years) Every 25 months except high risk As recommended by your PCP or Specialist   As recommended by your PCP or Specialist     HIV Screening (includes patients at high risk and includes any patient that requests the test and pregnant women Covered once every 12 months or up to 3 times during pregnancy                 Hepatitis C screening tests             Indicated for patients with at least one of the following: current or past history of IV drug use, those who had blood transfusion before 1992, those born between [facility].        Please contact Erlinda RN/Nurse Navigator with any questions about your visit or instructions today. Thank you for the opportunity for us to participate in your care.

## 2018-02-23 NOTE — ACP (ADVANCE CARE PLANNING)
Advance Care Planning (ACP) Provider Conversation Snapshot    Date of ACP Conversation: 02/23/18  Persons included in Conversation:  patient  Length of ACP Conversation in minutes:  16 minutes            For Patients with Decision Making Capacity:   Values/Goals: Exploration of values, goals, and preferences if recovery is not expected, even with continued medical treatment in the event of:  Imminent death    Conversation Outcomes / Follow-Up Plan:   Recommended completion of Advance Directive form after review of ACP materials and conversation with prospective healthcare agent

## 2018-02-23 NOTE — PROGRESS NOTES
Anthony Oliver is a 65 y.o. female  Chief Complaint   Patient presents with   Joe Burnette Annual Wellness Visit     medicare     Visit Vitals    /68 (BP 1 Location: Left arm, BP Patient Position: Sitting)    Pulse 86    Temp 98.2 °F (36.8 °C) (Oral)    Ht 5' 10\" (1.778 m)    Wt 199 lb 3.2 oz (90.4 kg)    SpO2 97%    BMI 28.58 kg/m2     Fasting today    1. Have you been to the ER, urgent care clinic since your last visit? Hospitalized since your last visit? no    2. Have you seen or consulted any other health care providers outside of the [address] [place] [address] since your last visit? Include any pap smears or colon screening.   Dr. Jeremiah Ca orthopaedic

## 2018-02-24 LAB
25(OH)D3+25(OH)D2 SERPL-MCNC: 15.1 NG/ML (ref 30–100)
ALBUMIN SERPL-MCNC: 4.3 G/DL (ref 3.5–4.8)
ALBUMIN/GLOB SERPL: 1.7 {RATIO} (ref 1.2–2.2)
ALP SERPL-CCNC: 61 IU/L (ref 39–117)
ALT SERPL-CCNC: 14 IU/L (ref 0–32)
AST SERPL-CCNC: 19 IU/L (ref 0–40)
BILIRUB SERPL-MCNC: 0.3 MG/DL (ref 0–1.2)
BUN SERPL-MCNC: 9 MG/DL (ref 8–27)
BUN/CREAT SERPL: 12 (ref 12–28)
CALCIUM SERPL-MCNC: 9.4 MG/DL (ref 8.7–10.3)
CHLORIDE SERPL-SCNC: 98 MMOL/L (ref 96–106)
CHOLEST SERPL-MCNC: 175 MG/DL (ref 100–199)
CO2 SERPL-SCNC: 28 MMOL/L (ref 18–29)
CREAT SERPL-MCNC: 0.78 MG/DL (ref 0.57–1)
ERYTHROCYTE [DISTWIDTH] IN BLOOD BY AUTOMATED COUNT: 12.8 % (ref 12.3–15.4)
GFR SERPLBLD CREATININE-BSD FMLA CKD-EPI: 73 ML/MIN/1.73
GFR SERPLBLD CREATININE-BSD FMLA CKD-EPI: 84 ML/MIN/1.73
GLOBULIN SER CALC-MCNC: 2.6 G/DL (ref 1.5–4.5)
GLUCOSE SERPL-MCNC: 98 MG/DL (ref 65–99)
HCT VFR BLD AUTO: 41.3 % (ref 34–46.6)
HDLC SERPL-MCNC: 82 MG/DL
HGB BLD-MCNC: 13.2 G/DL (ref 11.1–15.9)
INTERPRETATION, 910389: NORMAL
LDLC SERPL CALC-MCNC: 81 MG/DL (ref 0–99)
MCH RBC QN AUTO: 29.7 PG (ref 26.6–33)
MCHC RBC AUTO-ENTMCNC: 32 G/DL (ref 31.5–35.7)
MCV RBC AUTO: 93 FL (ref 79–97)
PLATELET # BLD AUTO: 237 X10E3/UL (ref 150–379)
POTASSIUM SERPL-SCNC: 4.2 MMOL/L (ref 3.5–5.2)
PROT SERPL-MCNC: 6.9 G/DL (ref 6–8.5)
RBC # BLD AUTO: 4.44 X10E6/UL (ref 3.77–5.28)
SODIUM SERPL-SCNC: 140 MMOL/L (ref 134–144)
TRIGL SERPL-MCNC: 58 MG/DL (ref 0–149)
TSH SERPL DL<=0.005 MIU/L-ACNC: 1.66 UIU/ML (ref 0.45–4.5)
VLDLC SERPL CALC-MCNC: 12 MG/DL (ref 5–40)
WBC # BLD AUTO: 4.2 X10E3/UL (ref 3.4–10.8)

## 2018-02-26 NOTE — PROGRESS NOTES
Spoke with patient and confirmed patient identity x2. Reviewed results and provider recommendations. Patient voiced understanding and had no other concerns at the time of call.

## 2018-03-13 RX ORDER — FLUTICASONE PROPIONATE 50 MCG
SPRAY, SUSPENSION (ML) NASAL
Qty: 1 BOTTLE | Refills: 2 | Status: SHIPPED | OUTPATIENT
Start: 2018-03-13 | End: 2018-04-12 | Stop reason: SDUPTHER

## 2018-04-02 RX ORDER — BENZONATATE 200 MG/1
CAPSULE ORAL
Qty: 21 CAP | Refills: 0 | Status: SHIPPED | OUTPATIENT
Start: 2018-04-02 | End: 2019-01-31 | Stop reason: SDUPTHER

## 2018-04-12 RX ORDER — FLUTICASONE PROPIONATE 50 MCG
SPRAY, SUSPENSION (ML) NASAL
Qty: 1 BOTTLE | Refills: 2 | Status: SHIPPED | OUTPATIENT
Start: 2018-04-12 | End: 2019-04-15 | Stop reason: SDUPTHER

## 2018-04-13 DIAGNOSIS — J01.00 SUBACUTE MAXILLARY SINUSITIS: ICD-10-CM

## 2018-04-13 RX ORDER — FLUTICASONE PROPIONATE 50 MCG
SPRAY, SUSPENSION (ML) NASAL
Qty: 1 BOTTLE | Refills: 0 | Status: SHIPPED | OUTPATIENT
Start: 2018-04-13 | End: 2019-02-15 | Stop reason: SDUPTHER

## 2018-04-24 DIAGNOSIS — I10 ESSENTIAL HYPERTENSION WITH GOAL BLOOD PRESSURE LESS THAN 130/85: ICD-10-CM

## 2018-04-24 RX ORDER — HYDRALAZINE HYDROCHLORIDE 50 MG/1
TABLET, FILM COATED ORAL
Qty: 270 TAB | Refills: 0 | Status: SHIPPED | OUTPATIENT
Start: 2018-04-24 | End: 2018-07-25 | Stop reason: SDUPTHER

## 2018-05-18 DIAGNOSIS — J30.89 ENVIRONMENTAL AND SEASONAL ALLERGIES: ICD-10-CM

## 2018-05-18 RX ORDER — MONTELUKAST SODIUM 10 MG/1
TABLET ORAL
Qty: 90 TAB | Refills: 0 | Status: SHIPPED | OUTPATIENT
Start: 2018-05-18 | End: 2018-08-20 | Stop reason: SDUPTHER

## 2018-05-18 RX ORDER — LOSARTAN POTASSIUM AND HYDROCHLOROTHIAZIDE 12.5; 5 MG/1; MG/1
TABLET ORAL
Qty: 90 TAB | Refills: 0 | Status: SHIPPED | OUTPATIENT
Start: 2018-05-18 | End: 2018-08-26 | Stop reason: SDUPTHER

## 2018-07-25 DIAGNOSIS — I10 ESSENTIAL HYPERTENSION WITH GOAL BLOOD PRESSURE LESS THAN 130/85: ICD-10-CM

## 2018-07-25 RX ORDER — HYDRALAZINE HYDROCHLORIDE 50 MG/1
TABLET, FILM COATED ORAL
Qty: 270 TAB | Refills: 0 | Status: SHIPPED | OUTPATIENT
Start: 2018-07-25 | End: 2018-10-20 | Stop reason: SDUPTHER

## 2018-08-20 DIAGNOSIS — J30.89 ENVIRONMENTAL AND SEASONAL ALLERGIES: ICD-10-CM

## 2018-08-20 RX ORDER — MONTELUKAST SODIUM 10 MG/1
TABLET ORAL
Qty: 90 TAB | Refills: 0 | Status: SHIPPED | OUTPATIENT
Start: 2018-08-20 | End: 2018-11-17 | Stop reason: SDUPTHER

## 2018-08-26 RX ORDER — LOSARTAN POTASSIUM AND HYDROCHLOROTHIAZIDE 12.5; 5 MG/1; MG/1
TABLET ORAL
Qty: 90 TAB | Refills: 0 | Status: SHIPPED | OUTPATIENT
Start: 2018-08-26 | End: 2018-11-17 | Stop reason: SDUPTHER

## 2018-10-20 DIAGNOSIS — I10 ESSENTIAL HYPERTENSION WITH GOAL BLOOD PRESSURE LESS THAN 130/85: ICD-10-CM

## 2018-10-21 RX ORDER — HYDRALAZINE HYDROCHLORIDE 50 MG/1
TABLET, FILM COATED ORAL
Qty: 270 TAB | Refills: 0 | Status: SHIPPED | OUTPATIENT
Start: 2018-10-21 | End: 2018-11-17 | Stop reason: SDUPTHER

## 2018-11-17 DIAGNOSIS — J30.89 ENVIRONMENTAL AND SEASONAL ALLERGIES: ICD-10-CM

## 2018-11-17 DIAGNOSIS — I10 ESSENTIAL HYPERTENSION WITH GOAL BLOOD PRESSURE LESS THAN 130/85: ICD-10-CM

## 2018-11-17 RX ORDER — LOSARTAN POTASSIUM AND HYDROCHLOROTHIAZIDE 12.5; 5 MG/1; MG/1
TABLET ORAL
Qty: 90 TAB | Refills: 0 | Status: SHIPPED | OUTPATIENT
Start: 2018-11-17 | End: 2019-02-07 | Stop reason: ALTCHOICE

## 2018-11-17 RX ORDER — MONTELUKAST SODIUM 10 MG/1
TABLET ORAL
Qty: 90 TAB | Refills: 0 | Status: SHIPPED | OUTPATIENT
Start: 2018-11-17 | End: 2019-02-12 | Stop reason: SDUPTHER

## 2018-11-18 RX ORDER — HYDRALAZINE HYDROCHLORIDE 50 MG/1
TABLET, FILM COATED ORAL
Qty: 270 TAB | Refills: 0 | Status: SHIPPED | OUTPATIENT
Start: 2018-11-18 | End: 2019-02-14 | Stop reason: SDUPTHER

## 2018-11-19 ENCOUNTER — TELEPHONE (OUTPATIENT)
Dept: PRIMARY CARE CLINIC | Age: 78
End: 2018-11-19

## 2018-11-19 NOTE — TELEPHONE ENCOUNTER
Dr. Leonard [place] telephone   Received: 2 days ago   Message Contents   Nikolas Oh 27 Office Pool             [facility] from [place] is requesting a 3 month refill on \"hydralazine' 50mg Best contact [phone]

## 2018-11-19 NOTE — TELEPHONE ENCOUNTER
Called [facility]obby and insured that the script was received yesterday. Confirmed and patient insurance is covering.

## 2018-11-19 NOTE — TELEPHONE ENCOUNTER
bobby requesting 90 day supply for patient for insurance  Last office visit  Last med refill  Refilled yesterday

## 2018-12-13 DIAGNOSIS — I10 ESSENTIAL HYPERTENSION WITH GOAL BLOOD PRESSURE LESS THAN 130/85: ICD-10-CM

## 2018-12-13 RX ORDER — AMLODIPINE BESYLATE 10 MG/1
TABLET ORAL
Qty: 90 TAB | Refills: 0 | Status: SHIPPED | OUTPATIENT
Start: 2018-12-13 | End: 2019-03-11 | Stop reason: SDUPTHER

## 2019-02-01 RX ORDER — BENZONATATE 200 MG/1
CAPSULE ORAL
Qty: 21 CAP | Refills: 0 | Status: SHIPPED | OUTPATIENT
Start: 2019-02-01 | End: 2019-02-19

## 2019-02-07 DIAGNOSIS — I10 ESSENTIAL HYPERTENSION: ICD-10-CM

## 2019-02-07 DIAGNOSIS — I10 ESSENTIAL HYPERTENSION: Primary | ICD-10-CM

## 2019-02-07 RX ORDER — OLMESARTAN MEDOXOMIL AND HYDROCHLOROTHIAZIDE 20/12.5 20; 12.5 MG/1; MG/1
TABLET ORAL
Qty: 90 TAB | Refills: 0 | Status: SHIPPED | OUTPATIENT
Start: 2019-02-07 | End: 2019-02-19

## 2019-02-07 RX ORDER — LOSARTAN POTASSIUM AND HYDROCHLOROTHIAZIDE 12.5; 5 MG/1; MG/1
TABLET ORAL
Qty: 90 TAB | Refills: 0 | Status: SHIPPED | OUTPATIENT
Start: 2019-02-07 | End: 2019-02-11 | Stop reason: ALTCHOICE

## 2019-02-07 RX ORDER — OLMESARTAN MEDOXOMIL AND HYDROCHLOROTHIAZIDE 20/12.5 20; 12.5 MG/1; MG/1
1 TABLET ORAL DAILY
Qty: 30 TAB | Refills: 0 | Status: SHIPPED | OUTPATIENT
Start: 2019-02-07 | End: 2019-02-07 | Stop reason: SDUPTHER

## 2019-02-11 ENCOUNTER — OFFICE VISIT (OUTPATIENT)
Dept: PRIMARY CARE CLINIC | Age: 79
End: 2019-02-11

## 2019-02-11 VITALS
TEMPERATURE: 98.2 F | SYSTOLIC BLOOD PRESSURE: 152 MMHG | RESPIRATION RATE: 16 BRPM | HEIGHT: 70 IN | OXYGEN SATURATION: 98 % | BODY MASS INDEX: 28 KG/M2 | DIASTOLIC BLOOD PRESSURE: 82 MMHG | HEART RATE: 82 BPM | WEIGHT: 195.6 LBS

## 2019-02-11 DIAGNOSIS — F41.9 ANXIETY: ICD-10-CM

## 2019-02-11 DIAGNOSIS — J01.00 SUBACUTE MAXILLARY SINUSITIS: Primary | ICD-10-CM

## 2019-02-11 DIAGNOSIS — J40 BRONCHITIS: ICD-10-CM

## 2019-02-11 DIAGNOSIS — I10 ESSENTIAL HYPERTENSION: ICD-10-CM

## 2019-02-11 RX ORDER — METHYLPREDNISOLONE 4 MG/1
TABLET ORAL
Qty: 1 DOSE PACK | Refills: 0 | Status: SHIPPED | OUTPATIENT
Start: 2019-02-11 | End: 2019-02-17

## 2019-02-11 RX ORDER — AZITHROMYCIN 250 MG/1
TABLET, FILM COATED ORAL
Qty: 6 TAB | Refills: 0 | Status: SHIPPED | OUTPATIENT
Start: 2019-02-11 | End: 2019-02-16

## 2019-02-11 RX ORDER — SERTRALINE HYDROCHLORIDE 25 MG/1
25 TABLET, FILM COATED ORAL DAILY
Qty: 30 TAB | Refills: 0 | Status: SHIPPED | OUTPATIENT
Start: 2019-02-11 | End: 2019-02-11 | Stop reason: SDUPTHER

## 2019-02-11 RX ORDER — SERTRALINE HYDROCHLORIDE 25 MG/1
TABLET, FILM COATED ORAL
Qty: 90 TAB | Refills: 0 | Status: SHIPPED | OUTPATIENT
Start: 2019-02-11 | End: 2019-03-20 | Stop reason: ALTCHOICE

## 2019-02-11 NOTE — PROGRESS NOTES
Written by Katya Limon, as dictated by Dr. Abdiaziz Chamorro MD.    Petar Breaux is a 77 y.o. female. HPI  The patient presents today c/o sore throat, hoarseness, nasal congestion, productive cough, and L ear pain. Pt has been taking OTC Mucinex and has a nasal spray at home. She has been taking Tessalon with relief. BP is high today at 160/73, 152/82 on repeat. She notes that she checked her BP yesterday and it was 141/72. Compliant on 1 tab olmesartan-HCTZ 20-12.5 mg daily, 1 tab hydralazine 50 mg TID, and 1 tab amlodipine 10 mg daily. Pt was previously prescribed Paxil 10 mg for anxiety but notes that it was too strong for her. She would like to try another medication instead. Patient Active Problem List   Diagnosis Code    OA (osteoarthritis) M19.90    Anxiety F41.9    HTN (hypertension) I10    Allergic rhinitis K06.4    Systolic murmur M05.7    Gastroesophageal reflux disease without esophagitis K21.9    Anxiety disorder F41.9        Current Outpatient Medications on File Prior to Visit   Medication Sig Dispense Refill    olmesartan-hydroCHLOROthiazide (BENICAR HCT) 20-12.5 mg per tablet TAKE 1 TABLET BY MOUTH EVERY DAY 90 Tab 0    benzonatate (TESSALON) 200 mg capsule TAKE 1 CAPSULE BY MOUTH THREE TIMES DAILY FOR UP TO 7 DAYS AS NEEDED FOR COUGH 21 Cap 0    amLODIPine (NORVASC) 10 mg tablet TAKE 1 TABLET BY MOUTH DAILY 90 Tab 0    hydrALAZINE (APRESOLINE) 50 mg tablet TAKE 1 TABLET BY MOUTH THREE TIMES DAILY 270 Tab 0    montelukast (SINGULAIR) 10 mg tablet TAKE 1 TABLET BY MOUTH DAILY 90 Tab 0    fluticasone (FLONASE) 50 mcg/actuation nasal spray INSTILL 2 SPRAYS IN EACH NOSTRIL ONCE DAILY 1 Bottle 2    ASPIRIN/SALICYLAMIDE/CAFFEINE (BC HEADACHE POWDER PO) Take  by mouth.  PARoxetine (PAXIL) 10 mg tablet TAKE 1 TABLET BY MOUTH DAILY 90 Tab 2    raNITIdine (ZANTAC) 150 mg tablet Take 150 mg by mouth two (2) times a day.       diclofenac EC (VOLTAREN) 75 mg EC tablet Take 1 Tab by mouth two (2) times daily as needed. 60 Tab 5    aspirin 81 mg chewable tablet Take 81 mg by mouth daily.  fluticasone (FLONASE) 50 mcg/actuation nasal spray SHAKE LIQUID AND USE 2 SPRAYS IN EACH NOSTRIL DAILY FOR 10 DAYS 1 Bottle 0     No current facility-administered medications on file prior to visit.         Allergies   Allergen Reactions    Hydrochlorothiazide Other (comments)       Pt denies allergy 2014       Past Medical History:   Diagnosis Date    Anxiety     Arthritis     GERD (gastroesophageal reflux disease)     Hypertension     Psychiatric disorder     anxiety d/o       Past Surgical History:   Procedure Laterality Date    HX GYN      hysterectomy    HX GYN      hysterectomy     HX ORTHOPAEDIC      total left knee    HX ORTHOPAEDIC      knee replacment  left,  right       Family History   Problem Relation Age of Onset    Stroke Maternal Grandmother     Hypertension Mother     Stroke Mother     Hypertension Brother     Stroke Brother        Social History     Socioeconomic History    Marital status: UNKNOWN     Spouse name: Not on file    Number of children: Not on file    Years of education: Not on file    Highest education level: Not on file   Social Needs    Financial resource strain: Not on file    Food insecurity - worry: Not on file    Food insecurity - inability: Not on file   [employer] needs - medical: Not on file   [employer] needs - non-medical: Not on file   Occupational History    Not on file   Tobacco Use    Smoking status: Former Smoker     Years: 8.00     Last attempt to quit: 1990     Years since quittin.1    Smokeless tobacco: Never Used   Substance and Sexual Activity    Alcohol use: No    Drug use: No    Sexual activity: Not Currently   Other Topics Concern    Not on file   Social History Narrative    ** Merged History Encounter **            Review of Systems   HENT: Positive for congestion, ear pain (L) and sore throat. Respiratory: Positive for cough and sputum production. Negative for shortness of breath. Musculoskeletal: Negative for joint pain and myalgias. Neurological: Negative for dizziness, tingling, sensory change and headaches. Psychiatric/Behavioral: Negative for depression, memory loss and substance abuse. The patient is nervous/anxious. Visit Vitals  /82 (BP 1 Location: Right arm, BP Patient Position: Sitting)   Pulse 82   Temp 98.2 °F (36.8 °C) (Oral)   Resp 16   Ht 5' 10\" (1.778 m)   Wt 195 lb 9.6 oz (88.7 kg)   SpO2 98%   BMI 28.07 kg/m²       Physical Exam   Constitutional: She is oriented to person, place, and time. She appears well-developed and well-nourished. No distress. HENT:   Right Ear: External ear normal.   Left Ear: External ear normal.   R maxillary sinus tenderness   Eyes: Conjunctivae and EOM are normal. Right eye exhibits no discharge. Left eye exhibits no discharge. Neck: Normal range of motion. Neck supple. Cardiovascular: Normal rate and regular rhythm. Pulmonary/Chest: Effort normal and breath sounds normal. She has no wheezes. Abdominal: Soft. Bowel sounds are normal. There is no tenderness. Lymphadenopathy:     She has no cervical adenopathy. Neurological: She is alert and oriented to person, place, and time. Skin: She is not diaphoretic. Psychiatric: She has a normal mood and affect. Her behavior is normal.   Nursing note and vitals reviewed. ASSESSMENT and PLAN    ICD-10-CM ICD-9-CM    1. Subacute maxillary sinusitis J01.00 461.0 azithromycin (ZITHROMAX) 250 mg tablet sent to pharmacy. methylPREDNISolone (MEDROL DOSEPACK) 4 mg tablet sent to pharmacy. Zithromax 250 mg and medrol dosepack prescribed. 2. Bronchitis J40 490 azithromycin (ZITHROMAX) 250 mg tablet sent to pharmacy. methylPREDNISolone (MEDROL DOSEPACK) 4 mg tablet sent to pharmacy.     Zithromax 250 mg and medrol debbie prescribed. 3. Anxiety F41.9 300.00 sertraline (ZOLOFT) 25 mg tablet sent to pharmacy. Zoloft 25 mg prescribed. She should take 1 tab daily. If she feels this prescription is too strong, she should take 1/2 tab. 4. Essential hypertension I10 401.9 Patient should take 2 tabs olmesartan-HCTZ 20-12.5 mg daily. She should monitor her BP at home at different times and record her readings. The patient should message send me her readings and I will let her know if her medication needs to be adjusted. This plan was reviewed with the patient and patient agrees. All questions were answered. This scribe documentation was reviewed by me and accurately reflects the examination and decisions made by me. This note will not be viewable in [address]

## 2019-02-11 NOTE — PROGRESS NOTES
Visit Vitals  /73 (BP 1 Location: Right arm, BP Patient Position: Sitting)   Pulse 82   Temp 98.2 °F (36.8 °C) (Oral)   Resp 16   Ht 5' 10\" (1.778 m)   Wt 195 lb 9.6 oz (88.7 kg)   SpO2 98%   BMI 28.07 kg/m²           Chief Complaint   Patient presents with    Cold Symptoms     Cough, ear pain-left, hoarsness     Medication Problem     Concerns about Losartan Recall     Anxiety             1. Have you been to the ER, urgent care clinic since your last visit? Hospitalized since your last visit? Denies     2. Have you seen or consulted any other health care providers outside of the [address] [place] [address] since your last visit? Include any pap smears or colon screening.  Denies

## 2019-02-12 DIAGNOSIS — J30.89 ENVIRONMENTAL AND SEASONAL ALLERGIES: ICD-10-CM

## 2019-02-12 RX ORDER — MONTELUKAST SODIUM 10 MG/1
TABLET ORAL
Qty: 90 TAB | Refills: 0 | Status: SHIPPED | OUTPATIENT
Start: 2019-02-12 | End: 2019-05-13 | Stop reason: SDUPTHER

## 2019-02-14 DIAGNOSIS — I10 ESSENTIAL HYPERTENSION WITH GOAL BLOOD PRESSURE LESS THAN 130/85: ICD-10-CM

## 2019-02-14 RX ORDER — HYDRALAZINE HYDROCHLORIDE 50 MG/1
TABLET, FILM COATED ORAL
Qty: 270 TAB | Refills: 0 | Status: SHIPPED | OUTPATIENT
Start: 2019-02-14 | End: 2019-05-15 | Stop reason: SDUPTHER

## 2019-02-15 ENCOUNTER — OFFICE VISIT (OUTPATIENT)
Dept: PRIMARY CARE CLINIC | Age: 79
End: 2019-02-15

## 2019-02-15 VITALS
DIASTOLIC BLOOD PRESSURE: 77 MMHG | HEIGHT: 70 IN | OXYGEN SATURATION: 100 % | WEIGHT: 193 LBS | HEART RATE: 84 BPM | SYSTOLIC BLOOD PRESSURE: 157 MMHG | TEMPERATURE: 97.7 F | BODY MASS INDEX: 27.63 KG/M2 | RESPIRATION RATE: 16 BRPM

## 2019-02-15 DIAGNOSIS — F43.9 STRESS AT HOME: ICD-10-CM

## 2019-02-15 DIAGNOSIS — I10 ESSENTIAL HYPERTENSION: Primary | ICD-10-CM

## 2019-02-15 DIAGNOSIS — F41.8 OTHER SPECIFIED ANXIETY DISORDERS: ICD-10-CM

## 2019-02-15 NOTE — PROGRESS NOTES
Identified pt with two pt identifiers(name and ). Reviewed record in preparation for visit and have obtained necessary documentation. Chief Complaint   Patient presents with    Hypertension     follow up        Health Maintenance Due   Topic    Shingrix Vaccine Age 50> (1 of 2)    Bone Densitometry (Dexa) Screening     GLAUCOMA SCREENING Q2Y     Influenza Age 5 to Adult     Pneumococcal 65+ Low/Medium Risk (2 of 2 - PPSV23)       Coordination of Care Questionnaire:  :   1) Have you been to an emergency room, urgent care, or hospitalized since your last visit?   no   If yes, where when, and reason for visit? 2. Have seen or consulted any other health care provider since your last visit? NO  If yes, where when, and reason for visit? 3) Do you have an Advanced Directive/ Living Will in place? NO  If yes, do we have a copy on file NO  If no, would you like information NO    Patient is accompanied by self I have received verbal consent from Renetta Palumbo to discuss any/all medical information while they are present in the room. Visit Vitals  /77 (BP 1 Location: Left arm, BP Patient Position: Sitting)   Pulse 84   Temp 97.7 °F (36.5 °C) (Oral)   Resp 16   Ht 5' 10\" (1.778 m)   Wt 193 lb (87.5 kg)   SpO2 100%   BMI 27.69 kg/m²     Richard Vuong LPN    .

## 2019-02-15 NOTE — PROGRESS NOTES
Written by Lawanna Kanner, as dictated by Dr. Adebayo Medina MD.    Mattie Adler is a 77 y.o. female. HPI  The patient presents today for a HTN follow-up. BP is high today at 157/77, 152/80 on repeat. This morning at home her BP was 148/75. She has been checking her BP at home but did not bring her readings to her appointment today. She took hydralazine 50 mg, amlodipine 10 mg, and Benicar 10-12.5 mg this morning. She notes that since her last appointment she called EMS are her BP was elevated. When EMS arrived, she was told not to worry as her BP was in the 990W systolic and she was nervous. All other stats were normal. Patient notes that she has been stressed as she has been worried about her children, which she believes is contributing to her elevated BP. She notes that she has been taking a lot of BC powder. Recently she has been eating foods that are high in sodium. Compliant on Zoloft 25 mg, which she started taking yesterday. Patient has been worrying about her children's health and family relationships. She has been experiencing allergy sxs and is taking Zyrtec. The patient has been experiencing sinus congestion. Patient received a flu shot in September or October of 2018. The patient plans to travel to [place] at the end of March 2019.     Patient Active Problem List   Diagnosis Code    OA (osteoarthritis) M19.90    HTN (hypertension) I10    Allergic rhinitis Y67.0    Systolic murmur L41.2    Gastroesophageal reflux disease without esophagitis K21.9    Anxiety disorder F41.9        Current Outpatient Medications on File Prior to Visit   Medication Sig Dispense Refill    hydrALAZINE (APRESOLINE) 50 mg tablet TAKE 1 TABLET BY MOUTH THREE TIMES DAILY 270 Tab 0    montelukast (SINGULAIR) 10 mg tablet TAKE 1 TABLET BY MOUTH DAILY 90 Tab 0    azithromycin (ZITHROMAX) 250 mg tablet use as directed 6 Tab 0    methylPREDNISolone (MEDROL DOSEPACK) 4 mg tablet use as directed 1 Dose Pack 0    sertraline (ZOLOFT) 25 mg tablet TAKE 1 TABLET BY MOUTH EVERY DAY 90 Tab 0    olmesartan-hydroCHLOROthiazide (BENICAR HCT) 20-12.5 mg per tablet TAKE 1 TABLET BY MOUTH EVERY DAY 90 Tab 0    benzonatate (TESSALON) 200 mg capsule TAKE 1 CAPSULE BY MOUTH THREE TIMES DAILY FOR UP TO 7 DAYS AS NEEDED FOR COUGH 21 Cap 0    amLODIPine (NORVASC) 10 mg tablet TAKE 1 TABLET BY MOUTH DAILY 90 Tab 0    fluticasone (FLONASE) 50 mcg/actuation nasal spray INSTILL 2 SPRAYS IN EACH NOSTRIL ONCE DAILY 1 Bottle 2    ASPIRIN/SALICYLAMIDE/CAFFEINE (BC HEADACHE POWDER PO) Take  by mouth.  raNITIdine (ZANTAC) 150 mg tablet Take 150 mg by mouth two (2) times a day.  diclofenac EC (VOLTAREN) 75 mg EC tablet Take 1 Tab by mouth two (2) times daily as needed. 60 Tab 5    aspirin 81 mg chewable tablet Take 81 mg by mouth daily. No current facility-administered medications on file prior to visit.         Allergies   Allergen Reactions    Hydrochlorothiazide Other (comments)       Pt denies allergy 1/6/2014       Past Medical History:   Diagnosis Date    Anxiety     Arthritis     GERD (gastroesophageal reflux disease)     Hypertension     Psychiatric disorder     anxiety d/o       Past Surgical History:   Procedure Laterality Date    HX GYN      hysterectomy    HX GYN      hysterectomy 1980    HX ORTHOPAEDIC      total left knee    HX ORTHOPAEDIC      knee replacment 2007 left, 2014 right       Family History   Problem Relation Age of Onset    Stroke Maternal Grandmother     Hypertension Mother     Stroke Mother     Hypertension Brother     Stroke Brother        Social History     Socioeconomic History    Marital status: UNKNOWN     Spouse name: Not on file    Number of children: Not on file    Years of education: Not on file    Highest education level: Not on file   Social Needs    Financial resource strain: Not on file    Food insecurity - worry: Not on file   [employer] insecurity - inability: Not on file    Transportation needs - medical: Not on file   [employer] needs - non-medical: Not on file   Occupational History    Not on file   Tobacco Use    Smoking status: Former Smoker     Years: 8.00     Last attempt to quit: 1990     Years since quittin.1    Smokeless tobacco: Never Used   Substance and Sexual Activity    Alcohol use: No    Drug use: No    Sexual activity: Not Currently   Other Topics Concern    Not on file   Social History Narrative    ** Merged History Encounter **            Review of Systems   HENT: Positive for congestion. Respiratory: Negative for cough and shortness of breath. Cardiovascular: Negative for chest pain and palpitations. Musculoskeletal: Negative for joint pain and myalgias. Neurological: Negative for dizziness, tingling, sensory change and headaches. Endo/Heme/Allergies: Positive for environmental allergies. Psychiatric/Behavioral: Negative for depression, memory loss and substance abuse. The patient is nervous/anxious. Visit Vitals  /77 (BP 1 Location: Left arm, BP Patient Position: Sitting)   Pulse 84   Temp 97.7 °F (36.5 °C) (Oral)   Resp 16   Ht 5' 10\" (1.778 m)   Wt 193 lb (87.5 kg)   SpO2 100%   BMI 27.69 kg/m²       Physical Exam   Constitutional: She is oriented to person, place, and time. She appears well-developed and well-nourished. No distress. HENT:   Right Ear: External ear normal.   Left Ear: External ear normal.   Eyes: Conjunctivae and EOM are normal. Right eye exhibits no discharge. Left eye exhibits no discharge. Neck: Normal range of motion. Neck supple. Cardiovascular: Normal rate and regular rhythm. Pulmonary/Chest: Effort normal and breath sounds normal. She has no wheezes. Abdominal: Soft. Bowel sounds are normal. There is no tenderness. Lymphadenopathy:     She has no cervical adenopathy. Neurological: She is alert and oriented to person, place, and time. Skin: She is not diaphoretic. Psychiatric: She has a normal mood and affect. Her behavior is normal.   Nursing note and vitals reviewed. ASSESSMENT and PLAN    ICD-10-CM ICD-9-CM    1. Essential hypertension I10 401.9 Discussed that she should decrease her sodium consumption. If her BP is still high, she should take 2 tabs Benicar 20-12.5 mg. Patient should stop taking BC powder. 2. Other specified anxiety disorders F41.8 300.09 She started taking Zoloft 25 mg yesterday. She should know if Zoloft is working in 2 weeks. 3. Stress at home F43.9 V61.9 She started taking Zoloft 25 mg yesterday. She should know if Zoloft is working in 2 weeks. Patient should take Zyrtec every morning. This plan was reviewed with the patient and patient agrees. All questions were answered. This scribe documentation was reviewed by me and accurately reflects the examination and decisions made by me. This note will not be viewable in [address]

## 2019-02-19 ENCOUNTER — APPOINTMENT (OUTPATIENT)
Dept: GENERAL RADIOLOGY | Age: 79
End: 2019-02-19
Attending: EMERGENCY MEDICINE
Payer: MEDICARE

## 2019-02-19 ENCOUNTER — APPOINTMENT (OUTPATIENT)
Dept: CT IMAGING | Age: 79
End: 2019-02-19
Attending: EMERGENCY MEDICINE
Payer: MEDICARE

## 2019-02-19 ENCOUNTER — HOSPITAL ENCOUNTER (EMERGENCY)
Age: 79
Discharge: HOME | End: 2019-02-19
Attending: EMERGENCY MEDICINE
Payer: MEDICARE

## 2019-02-19 VITALS
OXYGEN SATURATION: 95 % | SYSTOLIC BLOOD PRESSURE: 156 MMHG | HEIGHT: 69 IN | WEIGHT: 197.53 LBS | RESPIRATION RATE: 16 BRPM | DIASTOLIC BLOOD PRESSURE: 80 MMHG | HEART RATE: 71 BPM | BODY MASS INDEX: 29.26 KG/M2 | TEMPERATURE: 97.8 F

## 2019-02-19 DIAGNOSIS — Z86.73 OLD CEREBROVASCULAR ACCIDENT (CVA) WITHOUT LATE EFFECT: ICD-10-CM

## 2019-02-19 DIAGNOSIS — I10 UNCONTROLLED HYPERTENSION: Primary | ICD-10-CM

## 2019-02-19 LAB
ALBUMIN SERPL-MCNC: 3.8 G/DL (ref 3.5–5)
ALBUMIN/GLOB SERPL: 1.3 {RATIO} (ref 1.1–2.2)
ALP SERPL-CCNC: 56 U/L (ref 45–117)
ALT SERPL-CCNC: 14 U/L (ref 12–78)
ANION GAP SERPL CALC-SCNC: 10 MMOL/L (ref 5–15)
APPEARANCE UR: CLEAR
AST SERPL-CCNC: 20 U/L (ref 15–37)
ATRIAL RATE: 77 BPM
BACTERIA URNS QL MICRO: NEGATIVE /HPF
BASOPHILS # BLD: 0 K/UL (ref 0–0.1)
BASOPHILS NFR BLD: 0 % (ref 0–1)
BILIRUB SERPL-MCNC: 0.5 MG/DL (ref 0.2–1)
BILIRUB UR QL: NEGATIVE
BUN SERPL-MCNC: 11 MG/DL (ref 6–20)
BUN/CREAT SERPL: 12 (ref 12–20)
CALCIUM SERPL-MCNC: 9.3 MG/DL (ref 8.5–10.1)
CALCULATED P AXIS, ECG09: 21 DEGREES
CALCULATED R AXIS, ECG10: 3 DEGREES
CALCULATED T AXIS, ECG11: -8 DEGREES
CHLORIDE SERPL-SCNC: 92 MMOL/L (ref 97–108)
CO2 SERPL-SCNC: 29 MMOL/L (ref 21–32)
COLOR UR: ABNORMAL
COMMENT, HOLDF: NORMAL
CREAT SERPL-MCNC: 0.89 MG/DL (ref 0.55–1.02)
DIAGNOSIS, 93000: NORMAL
DIFFERENTIAL METHOD BLD: NORMAL
EOSINOPHIL # BLD: 0 K/UL (ref 0–0.4)
EOSINOPHIL NFR BLD: 0 % (ref 0–7)
EPITH CASTS URNS QL MICRO: ABNORMAL /LPF
ERYTHROCYTE [DISTWIDTH] IN BLOOD BY AUTOMATED COUNT: 11.6 % (ref 11.5–14.5)
GLOBULIN SER CALC-MCNC: 3 G/DL (ref 2–4)
GLUCOSE SERPL-MCNC: 100 MG/DL (ref 65–100)
GLUCOSE UR STRIP.AUTO-MCNC: NEGATIVE MG/DL
HCT VFR BLD AUTO: 44.3 % (ref 35–47)
HGB BLD-MCNC: 15.2 G/DL (ref 11.5–16)
HGB UR QL STRIP: NEGATIVE
IMM GRANULOCYTES # BLD AUTO: 0 K/UL (ref 0–0.04)
IMM GRANULOCYTES NFR BLD AUTO: 0 % (ref 0–0.5)
KETONES UR QL STRIP.AUTO: 15 MG/DL
LEUKOCYTE ESTERASE UR QL STRIP.AUTO: ABNORMAL
LYMPHOCYTES # BLD: 1.4 K/UL (ref 0.8–3.5)
LYMPHOCYTES NFR BLD: 30 % (ref 12–49)
MCH RBC QN AUTO: 31.1 PG (ref 26–34)
MCHC RBC AUTO-ENTMCNC: 34.3 G/DL (ref 30–36.5)
MCV RBC AUTO: 90.6 FL (ref 80–99)
MONOCYTES # BLD: 0.6 K/UL (ref 0–1)
MONOCYTES NFR BLD: 13 % (ref 5–13)
NEUTS SEG # BLD: 2.6 K/UL (ref 1.8–8)
NEUTS SEG NFR BLD: 56 % (ref 32–75)
NITRITE UR QL STRIP.AUTO: NEGATIVE
NRBC # BLD: 0 K/UL (ref 0–0.01)
NRBC BLD-RTO: 0 PER 100 WBC
P-R INTERVAL, ECG05: 168 MS
PH UR STRIP: 7.5 [PH] (ref 5–8)
PLATELET # BLD AUTO: 237 K/UL (ref 150–400)
PMV BLD AUTO: 9.3 FL (ref 8.9–12.9)
POTASSIUM SERPL-SCNC: 3.6 MMOL/L (ref 3.5–5.1)
PROT SERPL-MCNC: 6.8 G/DL (ref 6.4–8.2)
PROT UR STRIP-MCNC: NEGATIVE MG/DL
Q-T INTERVAL, ECG07: 332 MS
QRS DURATION, ECG06: 78 MS
QTC CALCULATION (BEZET), ECG08: 375 MS
RBC # BLD AUTO: 4.89 M/UL (ref 3.8–5.2)
RBC #/AREA URNS HPF: ABNORMAL /HPF (ref 0–5)
SAMPLES BEING HELD,HOLD: NORMAL
SODIUM SERPL-SCNC: 131 MMOL/L (ref 136–145)
SP GR UR REFRACTOMETRY: 1.01 (ref 1–1.03)
TROPONIN I SERPL-MCNC: <0.05 NG/ML
UR CULT HOLD, URHOLD: NORMAL
UROBILINOGEN UR QL STRIP.AUTO: 0.2 EU/DL (ref 0.2–1)
VENTRICULAR RATE, ECG03: 77 BPM
WBC # BLD AUTO: 4.7 K/UL (ref 3.6–11)
WBC URNS QL MICRO: ABNORMAL /HPF (ref 0–4)

## 2019-02-19 PROCEDURE — 99285 EMERGENCY DEPT VISIT HI MDM: CPT

## 2019-02-19 PROCEDURE — 76010010392 HC REMOVAL IMPACTED WAX IRRIGATION/LVG UNI

## 2019-02-19 PROCEDURE — 80053 COMPREHEN METABOLIC PANEL: CPT

## 2019-02-19 PROCEDURE — 93005 ELECTROCARDIOGRAM TRACING: CPT

## 2019-02-19 PROCEDURE — 71046 X-RAY EXAM CHEST 2 VIEWS: CPT

## 2019-02-19 PROCEDURE — 84484 ASSAY OF TROPONIN QUANT: CPT

## 2019-02-19 PROCEDURE — 36415 COLL VENOUS BLD VENIPUNCTURE: CPT

## 2019-02-19 PROCEDURE — 81001 URINALYSIS AUTO W/SCOPE: CPT

## 2019-02-19 PROCEDURE — 99284 EMERGENCY DEPT VISIT MOD MDM: CPT

## 2019-02-19 PROCEDURE — 70450 CT HEAD/BRAIN W/O DYE: CPT

## 2019-02-19 PROCEDURE — 85025 COMPLETE CBC W/AUTO DIFF WBC: CPT

## 2019-02-19 RX ORDER — LOSARTAN POTASSIUM 50 MG/1
50 TABLET ORAL DAILY
COMMUNITY
End: 2019-03-20 | Stop reason: ALTCHOICE

## 2019-02-19 NOTE — ED TRIAGE NOTES
dx'd with sinus infection last Monday, rechecked on Friday, was hypertensive, advised to double losartan dose. Continues to not feel well, /85 via EMS. Nausea, lack of appetite x several days. Has not spoken with md today. C/o generalized weakness and pain in right ear and bilateral shoulders.

## 2019-02-19 NOTE — ED PROVIDER NOTES
This patient comes in with multiple complaints. She complains of some burning to her right ear. This morning between 5 and 6 AM, 6 hours ago, she had about 45 minutes of burning to both shoulders. She has had no shortness of breath. No fever or chills. For the past week or 2, she has been dealing with a sinus infection. She was placed on steroids and antibiotics. She is done with the antibiotics but not the steroids. She was seen by her primary care physician twice last week. One of her blood pressure medicines was doubled. She is compliant with her blood pressure regimen. She felt weak and fatigued today and has felt this way in fact for more than a week. No headache. No diplopia or blurry vision. Appetite is fair. No chest or abdominal pain. No dysuria. No hemoptysis. She does cough up some clear sputum. No wheezing. She is an ex-smoker. She just didn't feel well today so came in here. Old chart reviewed - seen last week and had benicar doubled. Past Medical History:  
Diagnosis Date  Anxiety  Arthritis  GERD (gastroesophageal reflux disease)  Hypertension  Psychiatric disorder   
 anxiety d/o Past Surgical History:  
Procedure Laterality Date  HX GYN    
 hysterectomy [facility]    
 hysterectomy [id number]  HX ORTHOPAEDIC    
 total left knee  HX ORTHOPAEDIC    
 knee replacment 2007 left, 2014 right Family History:  
Problem Relation Age of Onset  Stroke Maternal Grandmother  Hypertension Mother  Stroke Mother  Hypertension Brother  Stroke Brother Social History Socioeconomic History  Marital status: UNKNOWN Spouse name: Not on file  Number of children: Not on file  Years of education: Not on file  Highest education level: Not on file Social Needs  Financial resource strain: Not on file  Food insecurity - worry: Not on file  Food insecurity - inability: Not on file  Transportation needs - medical: Not on file  Transportation needs - non-medical: Not on file Occupational History  Not on file Tobacco Use  Smoking status: Former Smoker Years: 8.00 Last attempt to quit: 1990 Years since quittin.1  Smokeless tobacco: Never Used Substance and Sexual Activity  Alcohol use: No  
 Drug use: No  
 Sexual activity: Not Currently Other Topics Concern  Not on file Social History Narrative ** Merged History Encounter ** ALLERGIES: Hydrochlorothiazide Review of Systems All other systems reviewed and are negative. Vitals:  
 19 1205 BP: 175/72 Pulse: 90 Resp: 14 Temp: 98.3 °F (36.8 °C) SpO2: 95% Weight: 89.6 kg (197 lb 8.5 oz) Height: 5' 9\" (1.753 m) Physical Exam  
Constitutional: She appears well-developed and well-nourished. No distress. HENT:  
Head: Normocephalic. Nose: Nose normal.  
Mouth/Throat: Oropharynx is clear and moist.  
R TM normal.  L TM occ by cerumen. Eyes: Conjunctivae are normal. Pupils are equal, round, and reactive to light. Neck: No tracheal deviation present. Cardiovascular: Normal rate, regular rhythm and intact distal pulses. Pulmonary/Chest: Effort normal and breath sounds normal.  
Abdominal: Soft. She exhibits no distension. There is no tenderness. There is no guarding. Musculoskeletal: She exhibits no edema. Neurological: She is alert. Skin: Skin is warm and dry. Capillary refill takes less than 2 seconds. She is not diaphoretic. Psychiatric: She has a normal mood and affect. MDM Procedures ED EKG interpretation: NSR Regular Rate 77 Nonspecific sttwa seen. This EKG was interpreted by Diana Cardona DO,ED Provider. Nurses irrigated L ear. I rechecked it. Still packed with cerumen. DC home. Will get debrox and use that. F/u PCP for BP check. No med changes. Labs Reviewed URINALYSIS W/MICROSCOPIC - Abnormal; Notable for the following components:  
    Result Value Ketone 15 (*) Leukocyte Esterase SMALL (*) All other components within normal limits METABOLIC PANEL, COMPREHENSIVE - Abnormal; Notable for the following components:  
 Sodium 131 (*) Chloride 92 (*) All other components within normal limits URINE CULTURE HOLD SAMPLE  
SAMPLES BEING HELD  
CBC WITH AUTOMATED DIFF  
TROPONIN I

## 2019-02-19 NOTE — DISCHARGE INSTRUCTIONS
Patient Education        Acute High Blood Pressure: Care Instructions  Your Care Instructions    Acute high blood pressure is very high blood pressure. It's a serious problem. Very high blood pressure can damage your brain, heart, eyes, and kidneys. You may have been given medicines to lower your blood pressure. You may have gotten them as pills or through a needle in one of your veins. This is called an IV. And maybe you were given other medicines too. These can be needed when high blood pressure causes other problems. To keep your blood pressure at a lower level, you may need to make healthy lifestyle changes. And you will probably need to take medicines. Be sure to follow up with your doctor about your blood pressure and what you can do about it. Follow-up care is a key part of your treatment and safety. Be sure to make and go to all appointments, and call your doctor if you are having problems. It's also a good idea to know your test results and keep a list of the medicines you take. How can you care for yourself at home? · See your doctor as often as he or she recommends. This is to make sure your blood pressure is under control. You will probably go at least 2 times a year. But it may be more often at first.  · Take your blood pressure medicine exactly as prescribed. You may take one or more types. They include diuretics, beta-blockers, ACE inhibitors, calcium channel blockers, and angiotensin II receptor blockers. Call your doctor if you think you are having a problem with your medicine. · If you take blood pressure medicine, talk to your doctor before you take decongestants or anti-inflammatory medicine, such as ibuprofen. These can raise blood pressure. · Learn how to check your blood pressure at home. Check it often. · Ask your doctor if it's okay to drink alcohol. · Talk to your doctor about lifestyle changes that can help blood pressure. These include being active and not smoking.   When should you call for help? Call 911 anytime you think you may need emergency care. This may mean having symptoms that suggest that your blood pressure is causing a serious heart or blood vessel problem. Your blood pressure may be over 180/120.   For example, call 911 if:    · You have symptoms of a heart attack. These may include:  ? Chest pain or pressure, or a strange feeling in the chest.  ? Sweating. ? Shortness of breath. ? Nausea or vomiting. ? Pain, pressure, or a strange feeling in the back, neck, jaw, or upper belly or in one or both shoulders or arms. ? Lightheadedness or sudden weakness. ? A fast or irregular heartbeat.     · You have symptoms of a stroke. These may include:  ? Sudden numbness, tingling, weakness, or loss of movement in your face, arm, or leg, especially on only one side of your body. ? Sudden vision changes. ? Sudden trouble speaking. ? Sudden confusion or trouble understanding simple statements. ? Sudden problems with walking or balance. ? A sudden, severe headache that is different from past headaches.     · You have severe back or belly pain.    Do not wait until your blood pressure comes down on its own. Get help right away.   Call your doctor now or seek immediate care if:    · Your blood pressure is much higher than normal (such as 180/120 or higher), but you don't have symptoms.     · You think high blood pressure is causing symptoms, such as:  ? Severe headache.  ? Blurry vision.    Watch closely for changes in your health, and be sure to contact your doctor if:    · Your blood pressure measures higher than your doctor recommends at least 2 times. That means the top number is higher or the bottom number is higher, or both.     · You think you may be having side effects from your blood pressure medicine. Where can you learn more? Go to http://fiona-marcello.info/.   Enter D995 in the search box to learn more about \"Acute High Blood Pressure: Care Instructions. \"  Current as of: July 22, 2018  Content Version: 11.9  © 6950-1205 [employer], [facility]. Care instructions adapted under license by [employer] (which disclaims liability or warranty for this information). If you have questions about a medical condition or this instruction, always ask your healthcare professional. [place] [address] any warranty or liability for your use of this information.

## 2019-02-19 NOTE — ED NOTES
Provided with d/c instructions, pt verbalized understanding. Provided with crackers and water. Pt will wait in room until room is needed or until family arrives to pick her up.

## 2019-02-28 ENCOUNTER — TELEPHONE (OUTPATIENT)
Dept: PRIMARY CARE CLINIC | Age: 79
End: 2019-02-28

## 2019-02-28 NOTE — TELEPHONE ENCOUNTER
Pt called stating that she has been getting dizzy, weak and her vision is blurred since she has started stating the medicine for the nerve pain.  Pt wants to know if she should discontinue    [phone]

## 2019-03-01 NOTE — TELEPHONE ENCOUNTER
Patient was seen 2/11/2019 and was started on zoloft. States that it is making her dizzy and she is taking in the morning. Should she stop taking.

## 2019-03-01 NOTE — TELEPHONE ENCOUNTER
Confirmed patient id and informed as per Dr Crenshaw Graft she needs to try 1/2 a tab. Received verbal understanding.

## 2019-03-05 ENCOUNTER — TELEPHONE (OUTPATIENT)
Dept: PRIMARY CARE CLINIC | Age: 79
End: 2019-03-05

## 2019-03-05 NOTE — TELEPHONE ENCOUNTER
Called to inform us that the family thinks Ms. Cristobal Drew may have early signs of Dimentia. Patient gets very upset when it is discussed among the family but they think it is important and needs to be addressed.  Patient has appointment next Tuesday

## 2019-03-05 NOTE — TELEPHONE ENCOUNTER
Patient's family just wants to make sure this is addressed at the 03/11/2019 appointment, as Ms. Sherwood Cousin is reluctant to discuss it. End of encounter.

## 2019-03-11 DIAGNOSIS — I10 ESSENTIAL HYPERTENSION: Primary | ICD-10-CM

## 2019-03-11 DIAGNOSIS — I10 ESSENTIAL HYPERTENSION WITH GOAL BLOOD PRESSURE LESS THAN 130/85: ICD-10-CM

## 2019-03-11 DIAGNOSIS — Z00.00 MEDICARE ANNUAL WELLNESS VISIT, SUBSEQUENT: ICD-10-CM

## 2019-03-11 RX ORDER — AMLODIPINE BESYLATE 10 MG/1
TABLET ORAL
Qty: 90 TAB | Refills: 0 | Status: SHIPPED | OUTPATIENT
Start: 2019-03-11 | End: 2019-03-20 | Stop reason: ALTCHOICE

## 2019-03-12 DIAGNOSIS — I10 ESSENTIAL HYPERTENSION WITH GOAL BLOOD PRESSURE LESS THAN 130/85: ICD-10-CM

## 2019-03-12 LAB
25(OH)D3+25(OH)D2 SERPL-MCNC: 25.3 NG/ML (ref 30–100)
ALBUMIN SERPL-MCNC: 4.3 G/DL (ref 3.5–4.8)
ALBUMIN/GLOB SERPL: 1.8 {RATIO} (ref 1.2–2.2)
ALP SERPL-CCNC: 52 IU/L (ref 39–117)
ALT SERPL-CCNC: 11 IU/L (ref 0–32)
AST SERPL-CCNC: 20 IU/L (ref 0–40)
BILIRUB SERPL-MCNC: 0.5 MG/DL (ref 0–1.2)
BUN SERPL-MCNC: 9 MG/DL (ref 8–27)
BUN/CREAT SERPL: 10 (ref 12–28)
CALCIUM SERPL-MCNC: 9.7 MG/DL (ref 8.7–10.3)
CHLORIDE SERPL-SCNC: 91 MMOL/L (ref 96–106)
CHOLEST SERPL-MCNC: 153 MG/DL (ref 100–199)
CO2 SERPL-SCNC: 22 MMOL/L (ref 20–29)
CREAT SERPL-MCNC: 0.87 MG/DL (ref 0.57–1)
ERYTHROCYTE [DISTWIDTH] IN BLOOD BY AUTOMATED COUNT: 12.3 % (ref 12.3–15.4)
GLOBULIN SER CALC-MCNC: 2.4 G/DL (ref 1.5–4.5)
GLUCOSE SERPL-MCNC: 95 MG/DL (ref 65–99)
HCT VFR BLD AUTO: 43.8 % (ref 34–46.6)
HDLC SERPL-MCNC: 71 MG/DL
HGB BLD-MCNC: 14.7 G/DL (ref 11.1–15.9)
LDLC SERPL CALC-MCNC: 66 MG/DL (ref 0–99)
MCH RBC QN AUTO: 31.2 PG (ref 26.6–33)
MCHC RBC AUTO-ENTMCNC: 33.6 G/DL (ref 31.5–35.7)
MCV RBC AUTO: 93 FL (ref 79–97)
PLATELET # BLD AUTO: 290 X10E3/UL (ref 150–379)
POTASSIUM SERPL-SCNC: 3.8 MMOL/L (ref 3.5–5.2)
PROT SERPL-MCNC: 6.7 G/DL (ref 6–8.5)
RBC # BLD AUTO: 4.71 X10E6/UL (ref 3.77–5.28)
SODIUM SERPL-SCNC: 132 MMOL/L (ref 134–144)
TRIGL SERPL-MCNC: 81 MG/DL (ref 0–149)
TSH SERPL DL<=0.005 MIU/L-ACNC: 1.61 UIU/ML (ref 0.45–4.5)
VLDLC SERPL CALC-MCNC: 16 MG/DL (ref 5–40)
WBC # BLD AUTO: 4.4 X10E3/UL (ref 3.4–10.8)

## 2019-03-12 RX ORDER — AMLODIPINE BESYLATE 10 MG/1
TABLET ORAL
Qty: 90 TAB | Refills: 0 | Status: SHIPPED | OUTPATIENT
Start: 2019-03-12 | End: 2020-05-15 | Stop reason: ALTCHOICE

## 2019-03-15 ENCOUNTER — TELEPHONE (OUTPATIENT)
Dept: PRIMARY CARE CLINIC | Age: 79
End: 2019-03-15

## 2019-03-15 NOTE — TELEPHONE ENCOUNTER
Please call patient regarding lab work.  I told her lab results will be discussed at her upcoming appointment but she would still like a call

## 2019-03-15 NOTE — TELEPHONE ENCOUNTER
Confirmed patient id and advised that the labs will be gone over with the patient at the next office visit

## 2019-03-20 ENCOUNTER — OFFICE VISIT (OUTPATIENT)
Dept: PRIMARY CARE CLINIC | Age: 79
End: 2019-03-20

## 2019-03-20 ENCOUNTER — HOSPITAL ENCOUNTER (OUTPATIENT)
Dept: MAMMOGRAPHY | Age: 79
Discharge: HOME | End: 2019-03-20
Attending: INTERNAL MEDICINE

## 2019-03-20 VITALS
HEART RATE: 76 BPM | OXYGEN SATURATION: 97 % | SYSTOLIC BLOOD PRESSURE: 132 MMHG | BODY MASS INDEX: 27.73 KG/M2 | RESPIRATION RATE: 18 BRPM | DIASTOLIC BLOOD PRESSURE: 76 MMHG | HEIGHT: 69 IN | WEIGHT: 187.2 LBS | TEMPERATURE: 97.8 F

## 2019-03-20 DIAGNOSIS — Z13.39 SCREENING FOR ALCOHOLISM: ICD-10-CM

## 2019-03-20 DIAGNOSIS — Z00.00 MEDICARE ANNUAL WELLNESS VISIT, SUBSEQUENT: Primary | ICD-10-CM

## 2019-03-20 DIAGNOSIS — E87.1 HYPONATREMIA: ICD-10-CM

## 2019-03-20 DIAGNOSIS — M81.0 POSTMENOPAUSAL BONE LOSS: ICD-10-CM

## 2019-03-20 DIAGNOSIS — F41.9 ANXIETY: ICD-10-CM

## 2019-03-20 DIAGNOSIS — Z12.11 COLON CANCER SCREENING: ICD-10-CM

## 2019-03-20 DIAGNOSIS — Z12.39 BREAST CANCER SCREENING: ICD-10-CM

## 2019-03-20 DIAGNOSIS — Z71.89 ACP (ADVANCE CARE PLANNING): ICD-10-CM

## 2019-03-20 DIAGNOSIS — I10 ESSENTIAL HYPERTENSION: ICD-10-CM

## 2019-03-20 RX ORDER — LOSARTAN POTASSIUM 100 MG/1
100 TABLET ORAL DAILY
Qty: 90 TAB | Refills: 0 | Status: SHIPPED | OUTPATIENT
Start: 2019-03-20 | End: 2019-06-13 | Stop reason: SDUPTHER

## 2019-03-20 RX ORDER — VENLAFAXINE HYDROCHLORIDE 37.5 MG/1
37.5 CAPSULE, EXTENDED RELEASE ORAL DAILY
Qty: 30 CAP | Refills: 0 | Status: SHIPPED | OUTPATIENT
Start: 2019-03-20 | End: 2019-03-20 | Stop reason: SDUPTHER

## 2019-03-20 RX ORDER — VENLAFAXINE HYDROCHLORIDE 37.5 MG/1
CAPSULE, EXTENDED RELEASE ORAL
Qty: 90 CAP | Refills: 0 | Status: SHIPPED | OUTPATIENT
Start: 2019-03-20 | End: 2019-06-13 | Stop reason: SDUPTHER

## 2019-03-20 NOTE — PATIENT INSTRUCTIONS
Medicare Wellness Visit, Female The best way to live healthy is to have a lifestyle where you eat a well-balanced diet, exercise regularly, limit alcohol use, and quit all forms of tobacco/nicotine, if applicable. Regular preventive services are another way to keep healthy. Preventive services (vaccines, screening tests, monitoring & exams) can help personalize your care plan, which helps you manage your own care. Screening tests can find health problems at the earliest stages, when they are easiest to treat. [facility] follows the current, evidence-based guidelines published by the [facility] ([facility]) when recommending preventive services for our patients. Because we follow these guidelines, sometimes recommendations change over time as research supports it. (For example, mammograms used to be recommended annually. Even though Medicare will still pay for an annual mammogram, the newer guidelines recommend a mammogram every two years for women of average risk.) Of course, you and your doctor may decide to screen more often for some diseases, based on your risk and your health status. Preventive services for you include: - Medicare offers their members a free annual wellness visit, which is time for you and your primary care provider to discuss and plan for your preventive service needs. Take advantage of this benefit every year! 
-All adults over the age of 72 should receive the recommended pneumonia vaccines. Current USPSTF guidelines recommend a series of two vaccines for the best pneumonia protection.  
-All adults should have a flu vaccine yearly and a tetanus vaccine every 10 years. All adults age 61 and older should receive a shingles vaccine once in their lifetime.   
-A bone mass density test is recommended when a woman turns 65 to screen for osteoporosis. This test is only recommended one time, as a screening. Some providers will use this same test as a disease monitoring tool if you already have osteoporosis. -All adults age 38-68 who are overweight should have a diabetes screening test once every three years.  
-Other screening tests and preventive services for persons with diabetes include: an eye exam to screen for diabetic retinopathy, a kidney function test, a foot exam, and stricter control over your cholesterol.  
-Cardiovascular screening for adults with routine risk involves an electrocardiogram (ECG) at intervals determined by your doctor.  
-Colorectal cancer screenings should be done for adults age 54-65 with no increased risk factors for colorectal cancer. There are a number of acceptable methods of screening for this type of cancer. Each test has its own benefits and drawbacks. Discuss with your doctor what is most appropriate for you during your annual wellness visit. The different tests include: colonoscopy (considered the best screening method), a fecal occult blood test, a fecal DNA test, and sigmoidoscopy. -Breast cancer screenings are recommended every other year for women of normal risk, age 54-69. 
-Cervical cancer screenings for women over age 72 are only recommended with certain risk factors.  
-All adults born between [facility] should be screened once for Hepatitis C. Here is a list of your current Health Maintenance items (your personalized list of preventive services) with a due date: 
Health Maintenance Due Topic Date Due  Shingles Vaccine (1 of 2) 04/02/1989  Bone Mineral Density   04/02/2004  Pneumococcal Vaccine (1 of 2 - PCV13) 04/02/2004  Glaucoma Screening   03/17/2018 Luz Elena Ruiz Annual Well Visit  02/24/2019

## 2019-03-20 NOTE — PROGRESS NOTES
This is the Subsequent Medicare Annual Wellness Exam, performed 12 months or more after the Initial AWV or the last Subsequent AWV    I have reviewed the patient's medical history in detail and updated the computerized patient record. History     Past Medical History:   Diagnosis Date    Anxiety     Arthritis     GERD (gastroesophageal reflux disease)     Hypertension     Psychiatric disorder     anxiety d/o      Past Surgical History:   Procedure Laterality Date    HX GYN      hysterectomy    HX GYN      hysterectomy     HX ORTHOPAEDIC      total left knee    HX ORTHOPAEDIC      knee replacment  left, 2014 right     Current Outpatient Medications   Medication Sig Dispense Refill    amLODIPine (NORVASC) 10 mg tablet TAKE 1 TABLET BY MOUTH DAILY 90 Tab 0    amLODIPine (NORVASC) 10 mg tablet TAKE 1 TABLET BY MOUTH DAILY 90 Tab 0    losartan (COZAAR) 50 mg tablet Take 50 mg by mouth daily.  hydrALAZINE (APRESOLINE) 50 mg tablet TAKE 1 TABLET BY MOUTH THREE TIMES DAILY 270 Tab 0    montelukast (SINGULAIR) 10 mg tablet TAKE 1 TABLET BY MOUTH DAILY 90 Tab 0    sertraline (ZOLOFT) 25 mg tablet TAKE 1 TABLET BY MOUTH EVERY DAY 90 Tab 0    fluticasone (FLONASE) 50 mcg/actuation nasal spray INSTILL 2 SPRAYS IN EACH NOSTRIL ONCE DAILY 1 Bottle 2    raNITIdine (ZANTAC) 150 mg tablet Take 150 mg by mouth two (2) times a day.  aspirin 81 mg chewable tablet Take 81 mg by mouth daily. Allergies   Allergen Reactions    Hydrochlorothiazide Other (comments)       Pt denies allergy 2014     Family History   Problem Relation Age of Onset    Stroke Maternal Grandmother     Hypertension Mother     Stroke Mother     Hypertension Brother     Stroke Brother      Social History     Tobacco Use    Smoking status: Former Smoker     Years: 8.00     Last attempt to quit: 1990     Years since quittin.2    Smokeless tobacco: Never Used   Substance Use Topics    Alcohol use:  No Patient Active Problem List   Diagnosis Code    OA (osteoarthritis) M19.90    HTN (hypertension) I10    Allergic rhinitis F45.6    Systolic murmur N61.1    Gastroesophageal reflux disease without esophagitis K21.9    Anxiety disorder F41.9       Depression Risk Factor Screening:     3 most recent PHQ Screens 3/20/2019   Little interest or pleasure in doing things Not at all   Feeling down, depressed, irritable, or hopeless Not at all   Total Score PHQ 2 0     Alcohol Risk Factor Screening:   Patient does not drink alcohol      Functional Ability and Level of Safety:   Hearing Loss: Hearing is Good     Activities of Daily Living  The home contains: no safety equipment. Patient needs help with:  shopping, laundry and housework    Fall Risk  Fall Risk Assessment, last 12 mths 3/20/2019   Able to walk? Yes   Fall in past 12 months? No   Number of falls in past 12 months -       Abuse Screen  Patient is not abused    Cognitive Screening   Evaluation of Cognitive Function:  Has your family/caregiver stated any concerns about your memory: yes  Normal, Clock Drawing test    Patient Care Team   Patient Care Team:  Giorgio Zuñiga MD as PCP - General (Internal Medicine)  Paulo Perla MD as Consulting Provider (Orthopedic Surgery)    Assessment/Plan   Education and counseling provided:  Are appropriate based on today's review and evaluation  End-of-Life planning (with patient's consent) discussed. Pneumococcal Vaccine  PPSV 23 done in 2017  Influenza Vaccine up to date  Screening Mammography order placed. Recommended getting it done. She will think about it   Screening Pap and pelvic (covered once every 2 years) Refused  Colorectal cancer screening tests Colonoscopy refused. Agreed for cologuard. Cardiovascular screening blood test Lipid panel in 03/19 normal.   Bone mass measurement (DEXA) patient refused. Last one in 2004 was normal as per patient.   Screening for glaucoma recommended making an appointment. Diabetes screening test FBS 95    Diagnoses and all orders for this visit:    Medicare annual wellness visit, subsequent  Immunization & Health screening discussed with her. Told her the consequences of not getting certain screening tests. ACP (advance care planning)  Paper work given. Told her to make an appointment with [facility]. Screening for alcoholism  Negative. Postmenopausal bone loss  -     DEXA BONE DENSITY STUDY AXIAL; Future    Breast cancer screening  -     ANISHA 3D ROSEANN W MAMMO BI SCREENING INCL CAD; Future    Colon cancer screening  -     COLOGUARD TEST (FECAL DNA COLORECTAL CANCER SCREENING)                     Problem Focused Progress Note    Name: Arleth Cueto Date: 3/21/2019  Ethnicity: NON-  Race: [address] [place].  MRN: [id number]  Age: 77 y.o.  : 1940  Sex: Female       HPI:   Verner Ferron. [facility] [place] is a 77y.o. year old female who presents today for wellness visit & follow up on her blood pressure and anxiety medication. Her readings have been running  In normal range lately & she thinks high readings were due to the stress & anxiety. She has been taking 2 tablets of cozaar ( total 100 mg) and Norvasc. She is not taking any Hctz as her sodium was low on last blood work. Denies any headache or dizziness. She does not think her memory is getting worse. Clock drawing test was normal.   She does not think zoloft was working for her , infact her anxiety was worsen so she had stopped taking it. She would like to try another anxiety medication.                  Review of Systems   Constitutional: negative for fevers, sweats and malaise  Eyes: negative for visual disturbance and redness  Ears, nose, mouth, throat, and face: negative for earaches, hoarseness and voice change  Respiratory: negative for cough, wheezing or dyspnea on exertion  Cardiovascular: negative for chest pain, palpitations, orthopnea, lower extremity edema  Gastrointestinal: negative for nausea, change in bowel habits and abdominal pain  Genitourinary:negative for dysuria and hematuria  Musculoskeletal:positive for arthralgias and stiff joints  Neurological: negative for dizziness, speech problems, coordination problems and gait problems  Behavioral/Psych: negative for aggressive behavior and repetitive activity  Endocrine: negative for diabetic symptoms including polyuria, polydipsia and pruritus    Physical Examination     Visit Vitals  /76 (BP 1 Location: Left arm, BP Patient Position: Sitting)   Pulse 76   Temp 97.8 °F (36.6 °C) (Oral)   Resp 18   Ht 5' 9\" (1.753 m)   Wt 187 lb 3.2 oz (84.9 kg)   SpO2 97%   BMI 27.64 kg/m²     General:  Alert, cooperative, no distress, appears stated age. Head:  Normocephalic, without obvious abnormality, atraumatic. Eyes:  Conjunctivae/corneas clear. PERRL, EOMs intact. Ears:  Normal TMs and external ear canals both ears. Nose: Nares normal. Septum midline. Mucosa normal. No drainage or sinus tenderness. Throat: Lips, mucosa, and tongue normal. Teeth and gums normal.   Neck: Supple, symmetrical, trachea midline, no adenopathy, thyroid: no enlargement/tenderness/nodules, no carotid bruit and no JVD. Back:   Symmetric, no curvature. ROM normal. No CVA tenderness. Lungs:   Clear to auscultation bilaterally. Chest wall:  No tenderness or deformity. Heart:  Regular rate and rhythm, S1, S2 normal, no murmur,       Abdomen:   Soft, non-tender. Bowel sounds normal. No masses,  No organomegaly. Extremities: Extremities normal, atraumatic, no cyanosis or edema. Pulses: 2+ and symmetric all extremities. Lymph nodes: Cervical, supraclavicular, and axillary nodes normal.   Neurologic:  Normal strength, sensation and reflexes throughout. Assessment/Plan       ICD-10-CM ICD-9-CM                         1. Essential hypertension I10 401.9 losartan (COZAAR) 100 mg tablet.  Told her she should take 1 tablet of Losartan as I have increase the dose. She verbalized understanding. 2. Anxiety F41.9 300.00 : venlafaxine-SR (EFFEXOR-XR) 37.5 mg capsule. Potential side effect discussed with her. Zoloft discontinued. 3. Hyponatremia E87.1 276.1 Sodium has slightly improved. No Hctz or SSRI due to low sodium. Will repeat in 6 weeks.    4. Postmenopausal bone loss M81.0 733.01 DEXA BONE DENSITY STUDY AXIAL  I told her since her last one was too long ago , should schedule one.   5. Breast cancer screening Z12.31 V76.10 [facility] 3D ROSEANN W MAMMO BI SCREENING INCL CAD              Hernandez Alfaro MD  3/21/2019  10:06 PM

## 2019-04-10 ENCOUNTER — TELEPHONE (OUTPATIENT)
Dept: PRIMARY CARE CLINIC | Age: 79
End: 2019-04-10

## 2019-04-10 NOTE — TELEPHONE ENCOUNTER
Patient called stated she is using flonase. And the package says to let your doctor know if you have Cataracts.  She has one and wanted to let Dr. Roopa Aldridge know

## 2019-04-11 NOTE — TELEPHONE ENCOUNTER
Confirmed patient id. Patient advised that it is ok to use the flonase for a few days without worry to cataracts. pateint states that she never called about cataracts because she has never been dx with cataracts.

## 2019-04-12 ENCOUNTER — HOSPITAL ENCOUNTER (OUTPATIENT)
Dept: MAMMOGRAPHY | Age: 79
Discharge: HOME | End: 2019-04-12
Attending: INTERNAL MEDICINE
Payer: MEDICARE

## 2019-04-12 DIAGNOSIS — M81.0 POSTMENOPAUSAL BONE LOSS: ICD-10-CM

## 2019-04-12 PROCEDURE — 77063 BREAST TOMOSYNTHESIS BI: CPT

## 2019-04-12 PROCEDURE — 77080 DXA BONE DENSITY AXIAL: CPT

## 2019-04-16 NOTE — PROGRESS NOTES
Let her know DEXA scan showed osteopenia. Please ask her how much calcium & vitamin D is she taking? Has she ever taken Fosamax or actonel ?

## 2019-04-16 NOTE — PROGRESS NOTES
Confirmed patient id and informed of results. Patient states she does not take calcium but takes vitamin d 1000IU. Patient states that she has not ever taken fosamax or actonel and will take if you send one to [facility] for her.

## 2019-04-17 NOTE — PROGRESS NOTES
Tell her to take calcium 500 mg daily along with vitamin D. I will discuss fosamax or Actonel option on her next visit.

## 2019-04-17 NOTE — PROGRESS NOTES
Confirmed patient id and advised per Dr Vance Singh she needs to take Calcium 500mg with the D. Advised that Dr Vance Singh will be discussing fosamax or actonel at the next visit. Patient gave verbal understanding.

## 2019-04-22 DIAGNOSIS — I10 ESSENTIAL HYPERTENSION: ICD-10-CM

## 2019-04-22 DIAGNOSIS — I10 ESSENTIAL HYPERTENSION: Primary | ICD-10-CM

## 2019-04-22 RX ORDER — HYDROCHLOROTHIAZIDE 12.5 MG/1
12.5 TABLET ORAL DAILY
Qty: 30 TAB | Refills: 0 | Status: SHIPPED | OUTPATIENT
Start: 2019-04-22 | End: 2019-04-22 | Stop reason: SDUPTHER

## 2019-04-22 RX ORDER — HYDROCHLOROTHIAZIDE 12.5 MG/1
TABLET ORAL
Qty: 90 TAB | Refills: 0 | Status: SHIPPED | OUTPATIENT
Start: 2019-04-22 | End: 2020-05-15 | Stop reason: SDUPTHER

## 2019-05-13 DIAGNOSIS — F41.9 ANXIETY: ICD-10-CM

## 2019-05-13 DIAGNOSIS — J30.89 ENVIRONMENTAL AND SEASONAL ALLERGIES: ICD-10-CM

## 2019-05-20 RX ORDER — MONTELUKAST SODIUM 10 MG/1
TABLET ORAL
Qty: 90 TAB | Refills: 0 | Status: SHIPPED | OUTPATIENT
Start: 2019-05-20 | End: 2019-08-17 | Stop reason: SDUPTHER

## 2019-05-20 RX ORDER — SERTRALINE HYDROCHLORIDE 25 MG/1
TABLET, FILM COATED ORAL
Qty: 90 TAB | Refills: 0 | OUTPATIENT
Start: 2019-05-20

## 2019-06-13 DIAGNOSIS — F41.9 ANXIETY: ICD-10-CM

## 2019-06-13 DIAGNOSIS — I10 ESSENTIAL HYPERTENSION: ICD-10-CM

## 2019-06-13 RX ORDER — VENLAFAXINE HYDROCHLORIDE 37.5 MG/1
CAPSULE, EXTENDED RELEASE ORAL
Qty: 90 CAP | Refills: 0 | Status: SHIPPED | OUTPATIENT
Start: 2019-06-13 | End: 2020-05-15 | Stop reason: ALTCHOICE

## 2019-06-13 RX ORDER — LOSARTAN POTASSIUM 100 MG/1
TABLET ORAL
Qty: 90 TAB | Refills: 0 | Status: SHIPPED | OUTPATIENT
Start: 2019-06-13 | End: 2019-09-09 | Stop reason: SDUPTHER

## 2019-06-14 DIAGNOSIS — I10 ESSENTIAL HYPERTENSION WITH GOAL BLOOD PRESSURE LESS THAN 130/85: ICD-10-CM

## 2019-06-14 RX ORDER — FLUTICASONE PROPIONATE 50 MCG
SPRAY, SUSPENSION (ML) NASAL
Qty: 1 BOTTLE | Refills: 1 | Status: SHIPPED | OUTPATIENT
Start: 2019-06-14 | End: 2019-08-13 | Stop reason: SDUPTHER

## 2019-06-14 RX ORDER — AMLODIPINE BESYLATE 10 MG/1
TABLET ORAL
Qty: 90 TAB | Refills: 0 | Status: SHIPPED | OUTPATIENT
Start: 2019-06-14 | End: 2019-12-23

## 2019-06-16 DIAGNOSIS — I10 ESSENTIAL HYPERTENSION: ICD-10-CM

## 2019-06-16 RX ORDER — HYDROCHLOROTHIAZIDE 12.5 MG/1
TABLET ORAL
Qty: 90 TAB | Refills: 0 | Status: SHIPPED | OUTPATIENT
Start: 2019-06-16 | End: 2019-09-21 | Stop reason: SDUPTHER

## 2019-08-11 DIAGNOSIS — I10 ESSENTIAL HYPERTENSION WITH GOAL BLOOD PRESSURE LESS THAN 130/85: ICD-10-CM

## 2019-08-11 RX ORDER — HYDRALAZINE HYDROCHLORIDE 50 MG/1
TABLET, FILM COATED ORAL
Qty: 270 TAB | Refills: 0 | Status: SHIPPED | OUTPATIENT
Start: 2019-08-11 | End: 2019-11-15 | Stop reason: SDUPTHER

## 2019-08-13 RX ORDER — FLUTICASONE PROPIONATE 50 MCG
SPRAY, SUSPENSION (ML) NASAL
Qty: 1 BOTTLE | Refills: 1 | Status: SHIPPED | OUTPATIENT
Start: 2019-08-13 | End: 2019-10-12 | Stop reason: SDUPTHER

## 2019-08-17 DIAGNOSIS — J30.89 ENVIRONMENTAL AND SEASONAL ALLERGIES: ICD-10-CM

## 2019-08-17 RX ORDER — MONTELUKAST SODIUM 10 MG/1
TABLET ORAL
Qty: 90 TAB | Refills: 0 | Status: SHIPPED | OUTPATIENT
Start: 2019-08-17 | End: 2020-03-04 | Stop reason: SDUPTHER

## 2019-08-17 RX ORDER — MONTELUKAST SODIUM 10 MG/1
TABLET ORAL
Qty: 90 TAB | Refills: 0 | Status: SHIPPED | OUTPATIENT
Start: 2019-08-17 | End: 2019-11-15

## 2019-09-09 DIAGNOSIS — I10 ESSENTIAL HYPERTENSION: ICD-10-CM

## 2019-09-09 RX ORDER — LOSARTAN POTASSIUM 100 MG/1
TABLET ORAL
Qty: 90 TAB | Refills: 0 | Status: SHIPPED | OUTPATIENT
Start: 2019-09-09 | End: 2019-12-03 | Stop reason: SDUPTHER

## 2019-09-21 DIAGNOSIS — I10 ESSENTIAL HYPERTENSION: ICD-10-CM

## 2019-09-21 RX ORDER — HYDROCHLOROTHIAZIDE 12.5 MG/1
TABLET ORAL
Qty: 90 TAB | Refills: 0 | Status: SHIPPED | OUTPATIENT
Start: 2019-09-21 | End: 2019-12-18

## 2019-10-12 RX ORDER — FLUTICASONE PROPIONATE 50 MCG
SPRAY, SUSPENSION (ML) NASAL
Qty: 1 BOTTLE | Refills: 0 | Status: SHIPPED | OUTPATIENT
Start: 2019-10-12 | End: 2019-10-13 | Stop reason: SDUPTHER

## 2019-10-18 ENCOUNTER — OFFICE VISIT (OUTPATIENT)
Dept: PRIMARY CARE CLINIC | Age: 79
End: 2019-10-18

## 2019-10-18 VITALS
HEART RATE: 87 BPM | BODY MASS INDEX: 28.26 KG/M2 | RESPIRATION RATE: 16 BRPM | HEIGHT: 69 IN | WEIGHT: 190.8 LBS | SYSTOLIC BLOOD PRESSURE: 163 MMHG | OXYGEN SATURATION: 98 % | TEMPERATURE: 98.2 F | DIASTOLIC BLOOD PRESSURE: 81 MMHG

## 2019-10-18 DIAGNOSIS — H66.92 LEFT OTITIS MEDIA, UNSPECIFIED OTITIS MEDIA TYPE: Primary | ICD-10-CM

## 2019-10-18 DIAGNOSIS — H61.22 LEFT EAR IMPACTED CERUMEN: ICD-10-CM

## 2019-10-18 RX ORDER — AMOXICILLIN 875 MG/1
875 TABLET, FILM COATED ORAL 2 TIMES DAILY
Qty: 20 TAB | Refills: 0 | Status: SHIPPED | OUTPATIENT
Start: 2019-10-18 | End: 2019-10-28

## 2019-10-18 NOTE — PROGRESS NOTES
Chief Complaint   Patient presents with    Cold Symptoms     states that she got her flu shot 2 weeks ago. started feeling bad on monday and states that she has sore throat, congestion cough, denies having fever and has some muscle pain.  has been around [facility]

## 2019-10-18 NOTE — PROGRESS NOTES
[facility]   Nina Mena 64[address], [address] [facility]  P: [phone]  F: [phone]      Chief Complaint   Patient presents with    Cold Symptoms     states that she got her flu shot 2 weeks ago. started feeling bad on monday and states that she has sore throat, congestion cough, denies having fever and has some muscle pain. has been around Elvia Ta is a [de-identified] y.o. female who presents to clinic for Cold Symptoms (states that she got her flu shot 2 weeks ago. started feeling bad on monday and states that she has sore throat, congestion cough, denies having fever and has some muscle pain. has been around [place]). HPI:    Aleshia Delarosa is an [de-identified] female who presents today for 5 to 6 days of left-sided ear pain, cough, and sore throat. She states she has been taking Zyrtec and Anh-Daggett cold tabs with only mild relief in pain. She endorses a history of seasonal allergies. She states both her daughter and grandson are sick with sinus/ear infections. She does endorse a mild cough, but denies shortness of breath or chest congestion. She has received her annual flu shot.   Patient Active Problem List    Diagnosis    Gastroesophageal reflux disease without esophagitis    Anxiety disorder    OA (osteoarthritis)    HTN (hypertension)    Allergic rhinitis          Past Medical History:   Diagnosis Date    Anxiety     Arthritis     GERD (gastroesophageal reflux disease)     Hypertension     Psychiatric disorder     anxiety d/o     Past Surgical History:   Procedure Laterality Date    HX GYN      hysterectomy    HX GYN      hysterectomy 1980    HX ORTHOPAEDIC      total left knee    HX ORTHOPAEDIC      knee replacment 2007 left, 2014 right     Social History     Socioeconomic History    Marital status: UNKNOWN     Spouse name: Not on file    Number of children: Not on file    Years of education: Not on file    Highest education level: Not on file   Occupational History    Not on file   Social Needs    Financial resource strain: Not on file    Food insecurity:     Worry: Not on file     Inability: Not on file    Transportation needs:     Medical: Not on file     Non-medical: Not on file   Tobacco Use    Smoking status: Former Smoker     Years: 8.00     Last attempt to quit: 1990     Years since quittin.8    Smokeless tobacco: Never Used   Substance and Sexual Activity    Alcohol use: No    Drug use: No    Sexual activity: Not Currently   Lifestyle    Physical activity:     Days per week: Not on file     Minutes per session: Not on file    Stress: Not on file   Relationships    Social connections:     Talks on phone: Not on file     Gets together: Not on file     Attends Restorationist service: Not on file     Active member of club or organization: Not on file     Attends meetings of clubs or organizations: Not on file     Relationship status: Not on file    Intimate partner violence:     Fear of current or ex partner: Not on file     Emotionally abused: Not on file     Physically abused: Not on file     Forced sexual activity: Not on file   Other Topics Concern    Not on file   Social History Narrative    ** Merged History Encounter **          Family History   Problem Relation Age of Onset    Stroke Maternal Grandmother     Hypertension Mother     Stroke Mother     Hypertension Brother     Stroke Brother      No Known Allergies    Current Outpatient Medications   Medication Sig Dispense Refill    amoxicillin (AMOXIL) 875 mg tablet Take 1 Tab by mouth two (2) times a day for 10 days. 20 Tab 0    carbamide peroxide (DEBROX) 6.5 % otic solution Administer 5 Drops into each ear two (2) times a day.  7.5 mL 0    hydroCHLOROthiazide (HYDRODIURIL) 12.5 mg tablet TAKE 1 TABLET BY MOUTH DAILY 90 Tab 0    losartan (COZAAR) 100 mg tablet TAKE 1 TABLET BY MOUTH EVERY DAY 90 Tab 0    montelukast (SINGULAIR) 10 mg tablet TAKE 1 TABLET BY MOUTH DAILY 90 Tab 0    hydrALAZINE (APRESOLINE) 50 mg tablet TAKE 1 TABLET BY MOUTH THREE TIMES DAILY 270 Tab 0    amLODIPine (NORVASC) 10 mg tablet TAKE 1 TABLET BY MOUTH DAILY 90 Tab 0    venlafaxine-SR (EFFEXOR-XR) 37.5 mg capsule TAKE 1 CAPSULE BY MOUTH EVERY DAY 90 Cap 0    hydroCHLOROthiazide (HYDRODIURIL) 12.5 mg tablet TAKE 1 TABLET BY MOUTH DAILY 90 Tab 0    fluticasone propionate (FLONASE) 50 mcg/actuation nasal spray USE 2 SPRAYS IN EACH NOSTRIL ONCE DAILY 1 Bottle 2    amLODIPine (NORVASC) 10 mg tablet TAKE 1 TABLET BY MOUTH DAILY 90 Tab 0    raNITIdine (ZANTAC) 150 mg tablet Take 150 mg by mouth two (2) times a day.  aspirin 81 mg chewable tablet Take 81 mg by mouth daily.  fluticasone propionate (FLONASE) 50 mcg/actuation nasal spray SHAKE LIQUID AND USE 2 SPRAYS IN EACH NOSTRIL EVERY DAY 1 Bottle 0    montelukast (SINGULAIR) 10 mg tablet TAKE 1 TABLET BY MOUTH DAILY 90 Tab 0           The medications were reviewed and updated in the medical record. The past medical history, past surgical history, and family history were reviewed and updated in the medical record. REVIEW OF SYSTEMS   Review of Systems   Constitutional: Negative for malaise/fatigue. HENT: Positive for ear pain and sore throat. Negative for congestion. Eyes: Negative for blurred vision and pain. Respiratory: Positive for cough. Negative for shortness of breath. Cardiovascular: Negative for chest pain and palpitations. Gastrointestinal: Negative for abdominal pain and heartburn. Genitourinary: Negative for frequency and urgency. Musculoskeletal: Negative for joint pain and myalgias. Neurological: Negative for dizziness, tingling, sensory change, weakness and headaches. Psychiatric/Behavioral: Negative for depression, memory loss and substance abuse.          PHYSICAL EXAM     Visit Vitals  /81 (BP 1 Location: Left arm, BP Patient Position: Sitting)   Pulse 87   Temp 98.2 °F (36.8 °C) (Oral)   Resp 16   Ht 5' 9\" (1.753 m)   Wt 190 lb 12.8 oz (86.5 kg)   SpO2 98%   BMI 28.18 kg/m²       Physical Exam   Constitutional: She is oriented to person, place, and time and well-developed, well-nourished, and in no distress. HENT:   Head: Normocephalic and atraumatic. Right Ear: External ear normal. Tympanic membrane is erythematous. Tympanic membrane is not bulging. Left Ear: There is drainage. Mouth/Throat: Posterior oropharyngeal erythema present. Unable to view left TM due to cerumen and drainage. Cardiovascular: Normal rate, regular rhythm and normal heart sounds. Pulmonary/Chest: Effort normal and breath sounds normal.   Mild expiratory wheeze   Musculoskeletal: Normal range of motion. She exhibits no edema. Neurological: She is alert and oriented to person, place, and time. Gait normal.   Skin: Skin is warm and dry. Psychiatric: Affect and judgment normal.   Nursing note and vitals reviewed. ASSESSMENT/ PLAN   Diagnoses and all orders for this visit:    1. Left otitis media, unspecified otitis media type  -     amoxicillin (AMOXIL) 875 mg tablet; Take 1 Tab by mouth two (2) times a day for 10 days. -     carbamide peroxide (DEBROX) 6.5 % otic solution; Administer 5 Drops into each ear two (2) times a day. 2. Left ear impacted cerumen  -     carbamide peroxide (DEBROX) 6.5 % otic solution; Administer 5 Drops into each ear two (2) times a day. Discussed she should return in 2 weeks for left-sided ear cleaning after infection is resolved. Monitor chest congestion-encouraged Mucinex and return if not improving. Monitor BP when well. Disclaimer:  Advised patient to call back or return to office if symptoms worsen/change/persist.  Discussed expected course/resolution/complications of diagnosis in detail with patient.     Medication risks/benefits/alternatives discussed with patient.   Patient was given an after visit summary which includes diagnoses, current medications, & vitals.      Discussed patient instructions and advised to read to all patient instructions regarding care.      Patient expressed understanding with the diagnosis and plan. This note will not be viewable in [address]         Alex Chang NP  10/18/2019        (This document has been electronically signed)

## 2019-11-13 DIAGNOSIS — J30.89 ENVIRONMENTAL AND SEASONAL ALLERGIES: ICD-10-CM

## 2019-11-13 RX ORDER — MONTELUKAST SODIUM 10 MG/1
TABLET ORAL
Qty: 90 TAB | Refills: 0 | Status: SHIPPED | OUTPATIENT
Start: 2019-11-13 | End: 2020-02-11

## 2019-11-15 DIAGNOSIS — I10 ESSENTIAL HYPERTENSION WITH GOAL BLOOD PRESSURE LESS THAN 130/85: ICD-10-CM

## 2019-11-15 RX ORDER — HYDRALAZINE HYDROCHLORIDE 50 MG/1
TABLET, FILM COATED ORAL
Qty: 270 TAB | Refills: 0 | Status: SHIPPED | OUTPATIENT
Start: 2019-11-15 | End: 2020-02-10

## 2020-02-09 RX ORDER — FLUTICASONE PROPIONATE 50 MCG
SPRAY, SUSPENSION (ML) NASAL
Qty: 16 G | Refills: 0 | Status: SHIPPED | OUTPATIENT
Start: 2020-02-09 | End: 2020-03-10

## 2020-03-01 DIAGNOSIS — I10 ESSENTIAL HYPERTENSION: ICD-10-CM

## 2020-03-01 RX ORDER — LOSARTAN POTASSIUM 100 MG/1
TABLET ORAL
Qty: 90 TAB | Refills: 0 | Status: SHIPPED | OUTPATIENT
Start: 2020-03-01 | End: 2020-06-01

## 2020-03-04 DIAGNOSIS — J30.89 ENVIRONMENTAL AND SEASONAL ALLERGIES: ICD-10-CM

## 2020-03-04 DIAGNOSIS — I10 ESSENTIAL HYPERTENSION WITH GOAL BLOOD PRESSURE LESS THAN 130/85: ICD-10-CM

## 2020-03-04 RX ORDER — HYDRALAZINE HYDROCHLORIDE 50 MG/1
TABLET, FILM COATED ORAL
Qty: 180 TAB | Refills: 0 | Status: SHIPPED | OUTPATIENT
Start: 2020-03-04 | End: 2020-05-03

## 2020-03-04 RX ORDER — MONTELUKAST SODIUM 10 MG/1
TABLET ORAL
Qty: 90 TAB | Refills: 0 | Status: SHIPPED | OUTPATIENT
Start: 2020-03-04 | End: 2020-06-01

## 2020-03-04 NOTE — TELEPHONE ENCOUNTER
LOV: 10/18/2019  Labs: 03/11/2019  Refill: 08/17/19, 02/10/2020  Next Appt: 03/23/2020- [employer] [facility]

## 2020-03-05 DIAGNOSIS — I10 ESSENTIAL HYPERTENSION: ICD-10-CM

## 2020-03-05 RX ORDER — HYDROCHLOROTHIAZIDE 12.5 MG/1
TABLET ORAL
Qty: 90 TAB | Refills: 0 | Status: SHIPPED | OUTPATIENT
Start: 2020-03-05 | End: 2020-04-04

## 2020-03-10 RX ORDER — FLUTICASONE PROPIONATE 50 MCG
SPRAY, SUSPENSION (ML) NASAL
Qty: 16 G | Refills: 0 | Status: SHIPPED | OUTPATIENT
Start: 2020-03-10 | End: 2020-04-09

## 2020-04-09 RX ORDER — FLUTICASONE PROPIONATE 50 MCG
SPRAY, SUSPENSION (ML) NASAL
Qty: 16 G | Refills: 0 | Status: SHIPPED | OUTPATIENT
Start: 2020-04-09 | End: 2020-05-15 | Stop reason: ALTCHOICE

## 2020-05-03 DIAGNOSIS — I10 ESSENTIAL HYPERTENSION WITH GOAL BLOOD PRESSURE LESS THAN 130/85: ICD-10-CM

## 2020-05-03 RX ORDER — HYDRALAZINE HYDROCHLORIDE 50 MG/1
TABLET, FILM COATED ORAL
Qty: 180 TAB | Refills: 0 | Status: SHIPPED | OUTPATIENT
Start: 2020-05-03 | End: 2020-06-28

## 2020-05-09 RX ORDER — FLUTICASONE PROPIONATE 50 MCG
SPRAY, SUSPENSION (ML) NASAL
Qty: 16 G | Refills: 0 | OUTPATIENT
Start: 2020-05-09

## 2020-05-11 NOTE — TELEPHONE ENCOUNTER
Call placed to patient to schedule telemed appointment for medication refill.    Verified   telemed visit scheduled for 5/15/20 @ 7900 with Dr Kevin Garzon

## 2020-05-15 ENCOUNTER — VIRTUAL VISIT (OUTPATIENT)
Dept: PRIMARY CARE CLINIC | Age: 80
End: 2020-05-15

## 2020-05-15 DIAGNOSIS — J30.1 SEASONAL ALLERGIC RHINITIS DUE TO POLLEN: ICD-10-CM

## 2020-05-15 DIAGNOSIS — K21.9 GASTROESOPHAGEAL REFLUX DISEASE WITHOUT ESOPHAGITIS: ICD-10-CM

## 2020-05-15 DIAGNOSIS — F41.0 ANXIETY ATTACK: ICD-10-CM

## 2020-05-15 DIAGNOSIS — I10 ESSENTIAL HYPERTENSION: Primary | ICD-10-CM

## 2020-05-15 RX ORDER — HYDROCHLOROTHIAZIDE 12.5 MG/1
12.5 TABLET ORAL DAILY
Qty: 90 TAB | Refills: 0 | Status: SHIPPED | OUTPATIENT
Start: 2020-05-15 | End: 2020-08-13

## 2020-05-15 RX ORDER — FAMOTIDINE 40 MG/1
40 TABLET, FILM COATED ORAL DAILY
Qty: 30 TAB | Refills: 2 | Status: SHIPPED | OUTPATIENT
Start: 2020-05-15 | End: 2020-08-08

## 2020-05-15 RX ORDER — AZELASTINE 1 MG/ML
1 SPRAY, METERED NASAL 2 TIMES DAILY
Qty: 1 BOTTLE | Refills: 1 | Status: SHIPPED | OUTPATIENT
Start: 2020-05-15 | End: 2020-08-15

## 2020-05-15 RX ORDER — ALPRAZOLAM 0.5 MG/1
0.5 TABLET ORAL
Qty: 10 TAB | Refills: 0 | Status: SHIPPED | OUTPATIENT
Start: 2020-05-15 | End: 2020-10-02

## 2020-05-15 NOTE — PROGRESS NOTES
Written by Suzie Mccullough, as dictated by Dr. Keke Cottrell MD.    Edita Burleson is a 79 y.o. female. HPI   I was in the office while conducting this encounter. Consent:  She and/or her healthcare decision maker is aware that this patient-initiated Telehealth encounter is a billable service, with coverage as determined by her insurance carrier. She is aware that she may receive a bill and has provided verbal consent to proceed: Yes    This virtual visit was conducted via [employer]. Pursuant to the emergency declaration under the [facility], [facility] waiver authority and the [employer] Act, this Virtual  Visit was conducted to reduce the patient's risk of exposure to COVID-19 and provide continuity of care for an established patient. Services were provided through a video synchronous discussion virtually to substitute for in-person clinic visit. Due to this being a TeleHealth evaluation, many elements of the physical examination are unable to be assessed. The patient presents today for a follow-up. She has been experiencing some nasal congestion due to her pollen allergies. She is compliant on Singulair 10 mg, and Flonase 50 mcg. She is wondering if she should try another nasal spray. She has not been taking Effexor-XR 37.5 mg because she feels she does not need a medication for everyday use and it would make her fatigued and cause vivid dreams. She used to take Xanax as needed. She would like to take Xanax again for her anxiety. She does check her BP at home. Her BP was 128/78 yesterday. She is compliant on Amlodipine 10 mg, Losartan 100 mg, Apresoline 50 mg, and HCTZ 12.5 mg. She has discontinued Zantac 150 mg. She has been taking Omeprazole and it has been helping her. She would like to try Pepcid. Of note, her sister passed away in January 2020.     Patient Active Problem List   Diagnosis Code    OA (osteoarthritis) M19.90    HTN (hypertension) I10    Allergic rhinitis J30.9    Gastroesophageal reflux disease without esophagitis K21.9    Anxiety disorder F41.9        Current Outpatient Medications on File Prior to Visit   Medication Sig Dispense Refill    hydrALAZINE (APRESOLINE) 50 mg tablet TAKE 1 TABLET BY MOUTH THREE TIMES DAILY 180 Tab 0    [DISCONTINUED] fluticasone propionate (FLONASE) 50 mcg/actuation nasal spray SHAKE LIQUID AND USE 2 SPRAYS IN EACH NOSTRIL EVERY DAY 16 g 0    amLODIPine (NORVASC) 10 mg tablet TAKE 1 TABLET BY MOUTH DAILY. NEED APPOINTMENT FOR MORE REFILLS. 90 Tab 0    montelukast (SINGULAIR) 10 mg tablet TAKE 1 TABLET BY MOUTH DAILY 90 Tab 0    losartan (COZAAR) 100 mg tablet TAKE 1 TABLET BY MOUTH EVERY DAY; PATIENT NEEDS APPOINTMENT BEFORE NEXT REFILL 90 Tab 0    carbamide peroxide (DEBROX) 6.5 % otic solution Administer 5 Drops into each ear two (2) times a day. 7.5 mL 0    [DISCONTINUED] venlafaxine-SR (EFFEXOR-XR) 37.5 mg capsule TAKE 1 CAPSULE BY MOUTH EVERY DAY 90 Cap 0    hydroCHLOROthiazide (HYDRODIURIL) 12.5 mg tablet TAKE 1 TABLET BY MOUTH DAILY 90 Tab 0    [DISCONTINUED] fluticasone propionate (FLONASE) 50 mcg/actuation nasal spray USE 2 SPRAYS IN EACH NOSTRIL ONCE DAILY 1 Bottle 2    [DISCONTINUED] amLODIPine (NORVASC) 10 mg tablet TAKE 1 TABLET BY MOUTH DAILY 90 Tab 0    [DISCONTINUED] raNITIdine (ZANTAC) 150 mg tablet Take 150 mg by mouth two (2) times a day.  aspirin 81 mg chewable tablet Take 81 mg by mouth daily. No current facility-administered medications on file prior to visit.         No Known Allergies    Past Medical History:   Diagnosis Date    Anxiety     Arthritis     GERD (gastroesophageal reflux disease)     Hypertension     Psychiatric disorder     anxiety d/o       Past Surgical History:   Procedure Laterality Date    HX GYN      hysterectomy    HX GYN      hysterectomy 1980    HX ORTHOPAEDIC      total left knee    HX ORTHOPAEDIC      knee replacment  left, 2014 right       Family History   Problem Relation Age of Onset    Stroke Maternal Grandmother     Hypertension Mother     Stroke Mother     Hypertension Brother     Stroke Brother        Social History     Socioeconomic History    Marital status: UNKNOWN     Spouse name: Not on file    Number of children: Not on file    Years of education: Not on file    Highest education level: Not on file   Occupational History    Not on file   Social Needs    Financial resource strain: Not on file    Food insecurity     Worry: Not on file     Inability: Not on file    Transportation needs     Medical: Not on file     Non-medical: Not on file   Tobacco Use    Smoking status: Former Smoker     Years: 8.00     Last attempt to quit: 1990     Years since quittin.3    Smokeless tobacco: Never Used   Substance and Sexual Activity    Alcohol use: No    Drug use: No    Sexual activity: Not Currently   Lifestyle    Physical activity     Days per week: Not on file     Minutes per session: Not on file    Stress: Not on file   Relationships    Social connections     Talks on phone: Not on file     Gets together: Not on file     Attends Voodoo service: Not on file     Active member of club or organization: Not on file     Attends meetings of clubs or organizations: Not on file     Relationship status: Not on file    Intimate partner violence     Fear of current or ex partner: Not on file     Emotionally abused: Not on file     Physically abused: Not on file     Forced sexual activity: Not on file   Other Topics Concern    Not on file   Social History Narrative    ** Merged History Encounter **            No visits with results within 3 Month(s) from this visit.    Latest known visit with results is:   Orders Only on 2019   Component Date Value Ref Range Status    VITAMIN D, 25-HYDROXY 2019 25.3* 30.0 - 100.0 ng/mL Final    Comment: Vitamin D deficiency has been defined by the [address] [facility] practice guideline as a  level of serum 25-OH vitamin D less than 20 ng/mL (1,2). The Endocrine Society went on to further define vitamin D  insufficiency as a level between 21 and 29 ng/mL (2). 1. IOM ([place] [employer]). 2010. Dietary reference     intakes for calcium and D. [facility]: The     [employer]. 2. Rishabh MF, Alejandro OLSON, Haven HUNT, et al.     Evaluation, treatment, and prevention of vitamin D     deficiency: an Endocrine Society clinical practice     guideline. JCEM. 2011 Jul; 96(7):1911-30.  TSH 03/11/2019 1.610  0.450 - 4.500 uIU/mL Final    Cholesterol, total 03/11/2019 153  100 - 199 mg/dL Final    Triglyceride 03/11/2019 81  0 - 149 mg/dL Final    HDL Cholesterol 03/11/2019 71  >39 mg/dL Final    VLDL, calculated 03/11/2019 16  5 - 40 mg/dL Final    LDL, calculated 03/11/2019 66  0 - 99 mg/dL Final    Glucose 03/11/2019 95  65 - 99 mg/dL Final    BUN 03/11/2019 9  8 - 27 mg/dL Final    Creatinine 03/11/2019 0.87  0.57 - 1.00 mg/dL Final    GFR est non-AA 03/11/2019 64  >59 mL/min/1.73 Final    GFR est AA 03/11/2019 73  >59 mL/min/1.73 Final    BUN/Creatinine ratio 03/11/2019 10* 12 - 28 Final    Sodium 03/11/2019 132* 134 - 144 mmol/L Final    Potassium 03/11/2019 3.8  3.5 - 5.2 mmol/L Final    Chloride 03/11/2019 91* 96 - 106 mmol/L Final    CO2 03/11/2019 22  20 - 29 mmol/L Final    Calcium 03/11/2019 9.7  8.7 - 10.3 mg/dL Final    Protein, total 03/11/2019 6.7  6.0 - 8.5 g/dL Final    Albumin 03/11/2019 4.3  3.5 - 4.8 g/dL Final    GLOBULIN, TOTAL 03/11/2019 2.4  1.5 - 4.5 g/dL Final    A-G Ratio 03/11/2019 1.8  1.2 - 2.2 Final    Bilirubin, total 03/11/2019 0.5  0.0 - 1.2 mg/dL Final    Alk.  phosphatase 03/11/2019 52  39 - 117 IU/L Final    AST (SGOT) 03/11/2019 20  0 - 40 IU/L Final    ALT (SGPT) 03/11/2019 11  0 - 32 IU/L Final    WBC 03/11/2019 4.4  3.4 - 10.8 x10E3/uL Final    RBC 03/11/2019 4.71  3.77 - 5.28 x10E6/uL Final    HGB 03/11/2019 14.7  11.1 - 15.9 g/dL Final    HCT 03/11/2019 43.8  34.0 - 46.6 % Final    MCV 03/11/2019 93  79 - 97 fL Final    MCH 03/11/2019 31.2  26.6 - 33.0 pg Final    MCHC 03/11/2019 33.6  31.5 - 35.7 g/dL Final    RDW 03/11/2019 12.3  12.3 - 15.4 % Final    PLATELET 02/80/6879 768  150 - 379 x10E3/uL Final       Review of Systems   Constitutional: Negative for malaise/fatigue. HENT: Positive for congestion. Eyes: Negative for blurred vision. Respiratory: Negative for shortness of breath. Cardiovascular: Negative for chest pain and leg swelling. Gastrointestinal: Positive for heartburn. Negative for constipation and diarrhea. Genitourinary: Negative for dysuria, frequency and urgency. Musculoskeletal: Negative for joint pain and myalgias. Neurological: Negative for dizziness and headaches. Endo/Heme/Allergies: Positive for environmental allergies. Psychiatric/Behavioral: Negative for depression and substance abuse. The patient is nervous/anxious. The patient does not have insomnia. Physical Exam  Constitutional:       General: She is not in acute distress. Appearance: She is well-developed. She is not diaphoretic. HENT:      Head: Normocephalic and atraumatic. Nose: No congestion. Eyes:      General:         Right eye: No discharge. Left eye: No discharge. Conjunctiva/sclera: Conjunctivae normal.   Pulmonary:      Effort: Pulmonary effort is normal. No respiratory distress. Neurological:      Mental Status: She is alert and oriented to person, place, and time. Psychiatric:         Behavior: Behavior normal.         Thought Content: Thought content normal.         ASSESSMENT and PLAN    ICD-10-CM ICD-9-CM    1.  Essential hypertension I10 401.9 azelastine (ASTELIN) 137 mcg (0.1 %) nasal spray sent to pharmacy     Astelin 137 mch (0.1 mcg) prescribed Potential side effects were discussed. Pt should spray once up both nostrils BID as needed       hydroCHLOROthiazide (HYDRODIURIL) 12.5 mg tablet sent to pharmacy     Hydrodiuril 12.6 mg refilled    2. Seasonal allergic rhinitis due to pollen J30.1 477.0 Pt should continue taking Singulair 10 mg and start taking Astelin for her allergy symptoms    3. Gastroesophageal reflux disease without esophagitis K21.9 530.81 famotidine (PEPCID) 40 mg tablet sent to pharmacy     Pepcid 40 mg prescribed Potential side effects were discussed. Pt should take 1 tab daily   4. Anxiety attack F41.0 300.01 ALPRAZolam (XANAX) 0.5 mg tablet sent to pharmacy     Xanax 0.5 mg prescribed Potential side effects were discussed. Pt should take 1 tab as needed for anxiety. Explained to pt that if she does take these everyday she will need to take a long term anxiety medication            Total Time: minutes: 21-30 minutes. This plan was reviewed with the patient and patient agrees. All questions were answered. This scribe documentation was reviewed by me and accurately reflects the examination and decisions made by me. This note will not be viewable in [address]

## 2020-06-01 DIAGNOSIS — I10 ESSENTIAL HYPERTENSION: ICD-10-CM

## 2020-06-01 DIAGNOSIS — J30.89 ENVIRONMENTAL AND SEASONAL ALLERGIES: ICD-10-CM

## 2020-06-01 RX ORDER — MONTELUKAST SODIUM 10 MG/1
TABLET ORAL
Qty: 90 TAB | Refills: 0 | Status: SHIPPED | OUTPATIENT
Start: 2020-06-01 | End: 2020-08-25

## 2020-06-01 RX ORDER — LOSARTAN POTASSIUM 100 MG/1
TABLET ORAL
Qty: 90 TAB | Refills: 0 | Status: SHIPPED | OUTPATIENT
Start: 2020-06-01 | End: 2020-08-25

## 2020-06-08 DIAGNOSIS — I10 ESSENTIAL HYPERTENSION WITH GOAL BLOOD PRESSURE LESS THAN 130/85: ICD-10-CM

## 2020-06-08 RX ORDER — AMLODIPINE BESYLATE 10 MG/1
TABLET ORAL
Qty: 90 TAB | Refills: 0 | Status: SHIPPED | OUTPATIENT
Start: 2020-06-08 | End: 2020-09-06

## 2020-06-27 DIAGNOSIS — I10 ESSENTIAL HYPERTENSION WITH GOAL BLOOD PRESSURE LESS THAN 130/85: ICD-10-CM

## 2020-06-28 DIAGNOSIS — I10 ESSENTIAL HYPERTENSION WITH GOAL BLOOD PRESSURE LESS THAN 130/85: ICD-10-CM

## 2020-06-28 RX ORDER — HYDRALAZINE HYDROCHLORIDE 50 MG/1
TABLET, FILM COATED ORAL
Qty: 180 TAB | Refills: 0 | Status: SHIPPED | OUTPATIENT
Start: 2020-06-28 | End: 2020-06-28

## 2020-06-28 RX ORDER — HYDRALAZINE HYDROCHLORIDE 50 MG/1
TABLET, FILM COATED ORAL
Qty: 270 TAB | Refills: 0 | Status: SHIPPED | OUTPATIENT
Start: 2020-06-28 | End: 2020-10-02

## 2020-08-08 DIAGNOSIS — K21.9 GASTROESOPHAGEAL REFLUX DISEASE WITHOUT ESOPHAGITIS: ICD-10-CM

## 2020-08-08 RX ORDER — FAMOTIDINE 40 MG/1
TABLET, FILM COATED ORAL
Qty: 30 TAB | Refills: 2 | Status: SHIPPED | OUTPATIENT
Start: 2020-08-08 | End: 2020-12-31

## 2020-08-13 DIAGNOSIS — I10 ESSENTIAL HYPERTENSION: ICD-10-CM

## 2020-08-13 RX ORDER — HYDROCHLOROTHIAZIDE 12.5 MG/1
TABLET ORAL
Qty: 90 TAB | Refills: 0 | Status: SHIPPED | OUTPATIENT
Start: 2020-08-13 | End: 2020-11-10

## 2020-08-15 DIAGNOSIS — I10 ESSENTIAL HYPERTENSION: ICD-10-CM

## 2020-08-15 RX ORDER — AZELASTINE 1 MG/ML
SPRAY, METERED NASAL
Qty: 1 BOTTLE | Refills: 0 | Status: SHIPPED | OUTPATIENT
Start: 2020-08-15 | End: 2020-10-09 | Stop reason: SDUPTHER

## 2020-08-25 DIAGNOSIS — I10 ESSENTIAL HYPERTENSION: ICD-10-CM

## 2020-08-25 DIAGNOSIS — J30.89 ENVIRONMENTAL AND SEASONAL ALLERGIES: ICD-10-CM

## 2020-08-25 RX ORDER — MONTELUKAST SODIUM 10 MG/1
TABLET ORAL
Qty: 90 TAB | Refills: 0 | Status: SHIPPED | OUTPATIENT
Start: 2020-08-25 | End: 2020-10-09 | Stop reason: SDUPTHER

## 2020-08-25 RX ORDER — LOSARTAN POTASSIUM 100 MG/1
TABLET ORAL
Qty: 90 TAB | Refills: 0 | Status: SHIPPED | OUTPATIENT
Start: 2020-08-25 | End: 2020-11-22

## 2020-09-06 DIAGNOSIS — I10 ESSENTIAL HYPERTENSION WITH GOAL BLOOD PRESSURE LESS THAN 130/85: ICD-10-CM

## 2020-09-06 RX ORDER — AMLODIPINE BESYLATE 10 MG/1
TABLET ORAL
Qty: 90 TAB | Refills: 0 | Status: SHIPPED | OUTPATIENT
Start: 2020-09-06 | End: 2020-12-06

## 2020-09-25 ENCOUNTER — OFFICE VISIT (OUTPATIENT)
Dept: PRIMARY CARE CLINIC | Age: 80
End: 2020-09-25
Payer: MEDICARE

## 2020-09-25 VITALS
HEIGHT: 69 IN | WEIGHT: 211.8 LBS | TEMPERATURE: 98.5 F | SYSTOLIC BLOOD PRESSURE: 138 MMHG | DIASTOLIC BLOOD PRESSURE: 79 MMHG | RESPIRATION RATE: 16 BRPM | OXYGEN SATURATION: 97 % | HEART RATE: 96 BPM | BODY MASS INDEX: 31.37 KG/M2

## 2020-09-25 DIAGNOSIS — Z23 NEED FOR SHINGLES VACCINE: ICD-10-CM

## 2020-09-25 DIAGNOSIS — M19.90 ARTHRITIS: ICD-10-CM

## 2020-09-25 DIAGNOSIS — F41.9 ANXIETY: ICD-10-CM

## 2020-09-25 DIAGNOSIS — I10 ESSENTIAL HYPERTENSION: ICD-10-CM

## 2020-09-25 DIAGNOSIS — Z00.00 MEDICARE ANNUAL WELLNESS VISIT, SUBSEQUENT: Primary | ICD-10-CM

## 2020-09-25 DIAGNOSIS — J30.89 ENVIRONMENTAL AND SEASONAL ALLERGIES: ICD-10-CM

## 2020-09-25 DIAGNOSIS — Z71.89 ACP (ADVANCE CARE PLANNING): ICD-10-CM

## 2020-09-25 DIAGNOSIS — R00.0 TACHYCARDIA: ICD-10-CM

## 2020-09-25 DIAGNOSIS — E55.9 VITAMIN D DEFICIENCY: ICD-10-CM

## 2020-09-25 DIAGNOSIS — Z23 NEEDS FLU SHOT: ICD-10-CM

## 2020-09-25 DIAGNOSIS — K21.9 GASTROESOPHAGEAL REFLUX DISEASE WITHOUT ESOPHAGITIS: ICD-10-CM

## 2020-09-25 LAB
25(OH)D3 SERPL-MCNC: 10.9 NG/ML (ref 30–100)
ALBUMIN SERPL-MCNC: 4.1 G/DL (ref 3.5–5)
ALBUMIN/GLOB SERPL: 1.3 {RATIO} (ref 1.1–2.2)
ALP SERPL-CCNC: 81 U/L (ref 45–117)
ALT SERPL-CCNC: 20 U/L (ref 12–78)
ANION GAP SERPL CALC-SCNC: 8 MMOL/L (ref 5–15)
AST SERPL-CCNC: 17 U/L (ref 15–37)
BASOPHILS # BLD: 0 K/UL (ref 0–0.1)
BASOPHILS NFR BLD: 1 % (ref 0–1)
BILIRUB SERPL-MCNC: 0.6 MG/DL (ref 0.2–1)
BUN SERPL-MCNC: 12 MG/DL (ref 6–20)
BUN/CREAT SERPL: 13 (ref 12–20)
CALCIUM SERPL-MCNC: 9.6 MG/DL (ref 8.5–10.1)
CHLORIDE SERPL-SCNC: 102 MMOL/L (ref 97–108)
CHOLEST SERPL-MCNC: 151 MG/DL
CO2 SERPL-SCNC: 28 MMOL/L (ref 21–32)
CREAT SERPL-MCNC: 0.94 MG/DL (ref 0.55–1.02)
DIFFERENTIAL METHOD BLD: NORMAL
EOSINOPHIL # BLD: 0.1 K/UL (ref 0–0.4)
EOSINOPHIL NFR BLD: 1 % (ref 0–7)
ERYTHROCYTE [DISTWIDTH] IN BLOOD BY AUTOMATED COUNT: 12.3 % (ref 11.5–14.5)
GLOBULIN SER CALC-MCNC: 3.1 G/DL (ref 2–4)
GLUCOSE SERPL-MCNC: 92 MG/DL (ref 65–100)
HCT VFR BLD AUTO: 42.8 % (ref 35–47)
HDLC SERPL-MCNC: 78 MG/DL
HDLC SERPL: 1.9 {RATIO} (ref 0–5)
HGB BLD-MCNC: 13.5 G/DL (ref 11.5–16)
IMM GRANULOCYTES # BLD AUTO: 0 K/UL (ref 0–0.04)
IMM GRANULOCYTES NFR BLD AUTO: 0 % (ref 0–0.5)
LDLC SERPL CALC-MCNC: 59.6 MG/DL (ref 0–100)
LIPID PROFILE,FLP: NORMAL
LYMPHOCYTES # BLD: 1.7 K/UL (ref 0.8–3.5)
LYMPHOCYTES NFR BLD: 37 % (ref 12–49)
MCH RBC QN AUTO: 29.9 PG (ref 26–34)
MCHC RBC AUTO-ENTMCNC: 31.5 G/DL (ref 30–36.5)
MCV RBC AUTO: 94.7 FL (ref 80–99)
MONOCYTES # BLD: 0.6 K/UL (ref 0–1)
MONOCYTES NFR BLD: 12 % (ref 5–13)
NEUTS SEG # BLD: 2.4 K/UL (ref 1.8–8)
NEUTS SEG NFR BLD: 49 % (ref 32–75)
NRBC # BLD: 0 K/UL (ref 0–0.01)
NRBC BLD-RTO: 0 PER 100 WBC
PLATELET # BLD AUTO: 271 K/UL (ref 150–400)
PMV BLD AUTO: 11 FL (ref 8.9–12.9)
POTASSIUM SERPL-SCNC: 4.3 MMOL/L (ref 3.5–5.1)
PROT SERPL-MCNC: 7.2 G/DL (ref 6.4–8.2)
RBC # BLD AUTO: 4.52 M/UL (ref 3.8–5.2)
SODIUM SERPL-SCNC: 138 MMOL/L (ref 136–145)
TRIGL SERPL-MCNC: 67 MG/DL (ref ?–150)
VLDLC SERPL CALC-MCNC: 13.4 MG/DL
WBC # BLD AUTO: 4.8 K/UL (ref 3.6–11)

## 2020-09-25 PROCEDURE — G8754 DIAS BP LESS 90: HCPCS | Performed by: INTERNAL MEDICINE

## 2020-09-25 PROCEDURE — G8399 PT W/DXA RESULTS DOCUMENT: HCPCS | Performed by: INTERNAL MEDICINE

## 2020-09-25 PROCEDURE — 90694 VACC AIIV4 NO PRSRV 0.5ML IM: CPT | Performed by: INTERNAL MEDICINE

## 2020-09-25 PROCEDURE — G8417 CALC BMI ABV UP PARAM F/U: HCPCS | Performed by: INTERNAL MEDICINE

## 2020-09-25 PROCEDURE — G8752 SYS BP LESS 140: HCPCS | Performed by: INTERNAL MEDICINE

## 2020-09-25 PROCEDURE — G0439 PPPS, SUBSEQ VISIT: HCPCS | Performed by: INTERNAL MEDICINE

## 2020-09-25 PROCEDURE — 1101F PT FALLS ASSESS-DOCD LE1/YR: CPT | Performed by: INTERNAL MEDICINE

## 2020-09-25 PROCEDURE — G8510 SCR DEP NEG, NO PLAN REQD: HCPCS | Performed by: INTERNAL MEDICINE

## 2020-09-25 PROCEDURE — G8427 DOCREV CUR MEDS BY ELIG CLIN: HCPCS | Performed by: INTERNAL MEDICINE

## 2020-09-25 PROCEDURE — 99214 OFFICE O/P EST MOD 30 MIN: CPT | Performed by: INTERNAL MEDICINE

## 2020-09-25 PROCEDURE — 1090F PRES/ABSN URINE INCON ASSESS: CPT | Performed by: INTERNAL MEDICINE

## 2020-09-25 PROCEDURE — G8536 NO DOC ELDER MAL SCRN: HCPCS | Performed by: INTERNAL MEDICINE

## 2020-09-25 PROCEDURE — G0008 ADMIN INFLUENZA VIRUS VAC: HCPCS | Performed by: INTERNAL MEDICINE

## 2020-09-25 RX ORDER — ZOSTER VACCINE RECOMBINANT, ADJUVANTED 50 MCG/0.5
0.5 KIT INTRAMUSCULAR ONCE
Qty: 0.5 ML | Refills: 0 | Status: SHIPPED | OUTPATIENT
Start: 2020-09-25 | End: 2020-09-25

## 2020-09-25 RX ORDER — BISOPROLOL FUMARATE 5 MG/1
5 TABLET ORAL DAILY
Qty: 30 TAB | Refills: 2 | Status: SHIPPED | OUTPATIENT
Start: 2020-09-25 | End: 2020-12-20

## 2020-09-25 RX ORDER — FAMOTIDINE 40 MG/1
40 TABLET, FILM COATED ORAL DAILY
Qty: 90 TAB | Refills: 0 | Status: SHIPPED | OUTPATIENT
Start: 2020-09-25 | End: 2020-12-24

## 2020-09-25 NOTE — PROGRESS NOTES
Yin Adams is a 79 y.o. female and presents for Annual Medicare Wellness Visit. Assessment of cognitive impairment: Alert and oriented x 3      Depression Screen:   3 most recent PHQ Screens 9/25/2020   Little interest or pleasure in doing things Not at all   Feeling down, depressed, irritable, or hopeless Not at all   Total Score PHQ 2 0       Fall Risk Assessment:    Fall Risk Assessment, last 12 mths 9/25/2020   Able to walk? Yes   Fall in past 12 months? No   Number of falls in past 12 months -       Abuse Screen:   Abuse Screening Questionnaire 9/25/2020   Do you ever feel afraid of your partner? N   Are you in a relationship with someone who physically or mentally threatens you? N   Is it safe for you to go home? Y       Activities of Daily Living:  Self-care. Requires assistance with: no ADLs  Patient handle his/her own medications  yes Use of pill box  yes  Activities of Daily Living:   ADL Assessment 3/20/2019   Feeding yourself No Help Needed   Getting from bed to chair No Help Needed   Getting dressed No Help Needed   Bathing or showering No Help Needed   Walk across the room (includes cane/walker) No Help Needed   Using the telphone No Help Needed   Taking your medications No Help Needed   Preparing meals No Help Needed   Managing money (expenses/bills) No Help Needed   Moderately strenuous housework (laundry) No Help Needed   Shopping for personal items (toiletries/medicines) No Help Needed   Shopping for groceries No Help Needed   Driving No Help Needed   Climbing a flight of stairs No Help Needed   Getting to places beyond walking distances No Help Needed       Health Maintenance:  Daily Aspirin: yes  Bone Density: up to date  Glaucoma Screening: yes  Immunizations:    Tetanus: up to date. Influenza: up to date. Shingles: script sent to the pharmacy   PPSV-23: up to date. Cancer screening:    Cervical: not any more , declined   Breast: up to date.   Colon: did cologuard las year Alcohol Risk Screen:   On any occasion during the past 3 months, have you had more than 3 drinks(female) or 4 drinks (male) containing alcohol in one? No  Do you average more than 7 drinks (female) or 14 drinks (male) per week? No      Hearing Loss:  Hearing is good. denies any hearing loss wears hearing aides    Vision Loss:   Wears glasses, contact lenses, or have any other visual impairment  yes    Adult Nutrition Screen:  No risk factors noted. Advance Care Planning:   End of Life Planning: has NO advanced directive  - add't info requested. Referral to RAD[facility]KA: yes,   Sarkis Freitas [address] ACP-Facilitator appointment yes      Medications/Allergies: Reviewed with patient  Prior to Admission medications    Medication Sig Start Date End Date Taking? Authorizing Provider   varicella-zoster recombinant, PF, (Shingrix, PF,) 50 mcg/0.5 mL susr injection 0.5 mL by IntraMUSCular route once for 1 dose. 9/25/20 9/25/20 Yes Maribel Humphrey MD   famotidine (PEPCID) 40 mg tablet Take 1 Tab by mouth daily for 90 days. 9/25/20 12/24/20 Yes Maribel Humphrey MD   amLODIPine (NORVASC) 10 mg tablet TAKE 1 TABLET BY MOUTH DAILY 9/6/20  Yes aMribel Humphrey MD   montelukast (SINGULAIR) 10 mg tablet TAKE 1 TABLET BY MOUTH DAILY 8/25/20  Yes Maribel Humphrey MD   losartan (COZAAR) 100 mg tablet TAKE 1 TABLET BY MOUTH EVERY DAY 8/25/20  Yes Maribel Humphrey MD   azelastine (ASTELIN) 137 mcg (0.1 %) nasal spray USE 1 SPRAY IN EACH NOSTRIL TWICE DAILY AS DIRECTED 8/15/20  Yes Maribel Humphrey MD   hydroCHLOROthiazide (HYDRODIURIL) 12.5 mg tablet TAKE 1 TABLET BY MOUTH DAILY 8/13/20  Yes Maribel Humphrey MD   famotidine (PEPCID) 40 mg tablet TAKE 1 TABLET BY MOUTH DAILY 8/8/20  Yes Maribel Humphrey MD   hydrALAZINE (APRESOLINE) 50 mg tablet TAKE 1 TABLET BY MOUTH THREE TIMES DAILY 6/28/20  Yes Maribel Humphrey MD   carbamide peroxide (DEBROX) 6.5 % otic solution Administer 5 Drops into each ear two (2) times a day.  10/18/19  Yes Forest Vazquez NP aspirin 81 mg chewable tablet Take 81 mg by mouth daily. Yes Provider, Historical       No Known Allergies    Objective:  Visit Vitals  BP (!) 164/90 (BP 1 Location: Left arm, BP Patient Position: Sitting)   Pulse (!) 114   Temp 98.5 °F (36.9 °C) (Oral)   Resp 16   Ht 5' 9\" (1.753 m)   Wt 211 lb 12.8 oz (96.1 kg)   SpO2 97%   BMI 31.28 kg/m²    Body mass index is 31.28 kg/m². Problem List: Reviewed with patient and discussed risk factors. Patient Active Problem List   Diagnosis Code    OA (osteoarthritis) M19.90    HTN (hypertension) I10    Allergic rhinitis J30.9    Gastroesophageal reflux disease without esophagitis K21.9    Anxiety disorder F41.9       PSH: Reviewed with patient  Past Surgical History:   Procedure Laterality Date    HX GYN      hysterectomy    HX GYN      hysterectomy     HX ORTHOPAEDIC      total left knee    HX ORTHOPAEDIC      knee replacment  left, 2014 right        SH: Reviewed with patient  Social History     Tobacco Use    Smoking status: Former Smoker     Years: 8.00     Last attempt to quit: 1990     Years since quittin.7    Smokeless tobacco: Never Used   Substance Use Topics    Alcohol use: No    Drug use: No       FH: Reviewed with patient  Family History   Problem Relation Age of Onset    Stroke Maternal Grandmother     Hypertension Mother     Stroke Mother     Hypertension Brother     Stroke Brother        Current medical providers:    Patient Care Team:  Wes Weir MD as PCP - General (Internal Medicine)  Wes Weir MD as PCP - REHABILITATION [facility] Empaneled Provider  Mimi Sidhu MD as Consulting Provider (Orthopedic Surgery)    Plan:      Diagnoses and all orders for this visit:    Medicare annual wellness visit, subsequent  Immunization and health screening discussed with her & her daughter over the phone. Magdalene Alberto( her daughter) is POA as well.      Needs flu shot  -     FLU (FLUAD QUAD INFLUENZA VACCINE,QUAD,ADJUVANTED)    Need for shingles vaccine  -     varicella-zoster recombinant, PF, (Shingrix, PF,) 50 mcg/0.5 mL susr injection; 0.5 mL by IntraMUSCular route once for 1 dose., Normal, Disp-0.5 mL,R-0    ACP (advance care planning)  -     REFERRAL TO ACP CLINICAL SPECIALIST      Orders Placed This Encounter    Influenza Vaccine, QUAD, 65 Yrs +  IM  (Fluad 06671 )    varicella-zoster recombinant, PF, (Shingrix, PF,) 50 mcg/0.5 mL susr injection    famotidine (PEPCID) 40 mg tablet       Health Maintenance   Topic Date Due    Shingrix Vaccine Age 49> (2 of 2) 01/30/2017    Flu Vaccine (1) 09/01/2020    GLAUCOMA SCREENING Q2Y  03/20/2021    Medicare Yearly Exam  09/26/2021    DTaP/Tdap/Td series (3 - Td) 12/16/2025    Bone Densitometry (Dexa) Screening  Completed    Pneumococcal 65+ years  Completed          Urinary/ Fecal Incontinence: none    Regular physical exercise: stays active at home but doesn`t go out for a walk since COVID came     Patient verbalized understanding of information presented. AVS and Medicare Part B Preventive Services Table printed and given to pt and reviewed. See table for findings under Recommendation and Scheduled. All of the patient's questions were answered. Written by Melissa Green, as dictated by Dr. Toni Gonzalez MD.    Giovana Medina is a 79 y.o. female. HPI  Pt presents today to follow up on her cholesterol and for a flu vaccine. Pt's BP is elevated today in office at 164/90, 138/79 on manual repeat in L arm while sitting down. Her pulse is elevated in office at 96 as well, and she has been experiencing intermittent periods of feeling as though her heart is racing. She is taking amlodipine, losartan, and HCTZ every day. She also takes Hydralazine TID. She takes Pepcid every day for acid reflux and notes that it does not bother her unless she eats something she shouldn't. She mainly avoids tomatoes and tomato-based sauces.      Her L knee has been swelling recently but is not hurting. She had it replaced 13 years ago, so she is thinking she might need a repair eventually. Her allergies have been bothering her despite taking Singulair and Zyrtec every day. She uses Xanax PRN and does well with it for her anxiety. She has gotten the old shingles vaccine but has not gotten the new one yet. She had an eye exam scheduled earlier this year which was cancelled due to Howie. However, she hopes to schedule another appt soon for her eye exam.     Patient Active Problem List   Diagnosis Code    OA (osteoarthritis) M19.90    HTN (hypertension) I10    Allergic rhinitis J30.9    Gastroesophageal reflux disease without esophagitis K21.9    Anxiety disorder F41.9        Current Outpatient Medications on File Prior to Visit   Medication Sig Dispense Refill    amLODIPine (NORVASC) 10 mg tablet TAKE 1 TABLET BY MOUTH DAILY 90 Tab 0    montelukast (SINGULAIR) 10 mg tablet TAKE 1 TABLET BY MOUTH DAILY 90 Tab 0    losartan (COZAAR) 100 mg tablet TAKE 1 TABLET BY MOUTH EVERY DAY 90 Tab 0    azelastine (ASTELIN) 137 mcg (0.1 %) nasal spray USE 1 SPRAY IN EACH NOSTRIL TWICE DAILY AS DIRECTED 1 Bottle 0    hydroCHLOROthiazide (HYDRODIURIL) 12.5 mg tablet TAKE 1 TABLET BY MOUTH DAILY 90 Tab 0    famotidine (PEPCID) 40 mg tablet TAKE 1 TABLET BY MOUTH DAILY 30 Tab 2    hydrALAZINE (APRESOLINE) 50 mg tablet TAKE 1 TABLET BY MOUTH THREE TIMES DAILY 270 Tab 0    carbamide peroxide (DEBROX) 6.5 % otic solution Administer 5 Drops into each ear two (2) times a day. 7.5 mL 0    aspirin 81 mg chewable tablet Take 81 mg by mouth daily. No current facility-administered medications on file prior to visit.         No Known Allergies    Past Medical History:   Diagnosis Date    Anxiety     Arthritis     GERD (gastroesophageal reflux disease)     Hypertension     Psychiatric disorder     anxiety d/o       Past Surgical History:   Procedure Laterality Date    HX GYN hysterectomy    HX GYN      hysterectomy     HX ORTHOPAEDIC      total left knee    HX ORTHOPAEDIC      knee replacment 2007 left, 2014 right       Family History   Problem Relation Age of Onset    Stroke Maternal Grandmother     Hypertension Mother     Stroke Mother     Hypertension Brother     Stroke Brother        Social History     Socioeconomic History    Marital status: UNKNOWN     Spouse name: Not on file    Number of children: Not on file    Years of education: Not on file    Highest education level: Not on file   Occupational History    Not on file   Social Needs    Financial resource strain: Not on file    Food insecurity     Worry: Not on file     Inability: Not on file    Transportation needs     Medical: Not on file     Non-medical: Not on file   Tobacco Use    Smoking status: Former Smoker     Years: 8.00     Last attempt to quit: 1990     Years since quittin.7    Smokeless tobacco: Never Used   Substance and Sexual Activity    Alcohol use: No    Drug use: No    Sexual activity: Not Currently   Lifestyle    Physical activity     Days per week: Not on file     Minutes per session: Not on file    Stress: Not on file   Relationships    Social connections     Talks on phone: Not on file     Gets together: Not on file     Attends Yazidism service: Not on file     Active member of club or organization: Not on file     Attends meetings of clubs or organizations: Not on file     Relationship status: Not on file    Intimate partner violence     Fear of current or ex partner: Not on file     Emotionally abused: Not on file     Physically abused: Not on file     Forced sexual activity: Not on file   Other Topics Concern    Not on file   Social History Narrative    ** Merged History Encounter **            No visits with results within 3 Month(s) from this visit.    Latest known visit with results is:   Orders Only on 2019   Component Date Value Ref Range Status    VITAMIN D, 25-HYDROXY 03/11/2019 25.3* 30.0 - 100.0 ng/mL Final    Comment: Vitamin D deficiency has been defined by the [facility] practice guideline as a  level of serum 25-OH vitamin D less than 20 ng/mL (1,2). The Endocrine Society went on to further define vitamin D  insufficiency as a level between 21 and 29 ng/mL (2). 1. IOM ([employer] of Medicine). 2010. Dietary reference     intakes for calcium and D. [address] [facility]: The     [employer]. 2. Rishabh MF, Alejandro OLSON, Haven HUNT, et al.     Evaluation, treatment, and prevention of vitamin D     deficiency: an Endocrine Society clinical practice     guideline. JCEM. 2011 Jul; 96(7):1911-30.  TSH 03/11/2019 1.610  0.450 - 4.500 uIU/mL Final    Cholesterol, total 03/11/2019 153  100 - 199 mg/dL Final    Triglyceride 03/11/2019 81  0 - 149 mg/dL Final    HDL Cholesterol 03/11/2019 71  >39 mg/dL Final    VLDL, calculated 03/11/2019 16  5 - 40 mg/dL Final    LDL, calculated 03/11/2019 66  0 - 99 mg/dL Final    Glucose 03/11/2019 95  65 - 99 mg/dL Final    BUN 03/11/2019 9  8 - 27 mg/dL Final    Creatinine 03/11/2019 0.87  0.57 - 1.00 mg/dL Final    GFR est non-AA 03/11/2019 64  >59 mL/min/1.73 Final    GFR est AA 03/11/2019 73  >59 mL/min/1.73 Final    BUN/Creatinine ratio 03/11/2019 10* 12 - 28 Final    Sodium 03/11/2019 132* 134 - 144 mmol/L Final    Potassium 03/11/2019 3.8  3.5 - 5.2 mmol/L Final    Chloride 03/11/2019 91* 96 - 106 mmol/L Final    CO2 03/11/2019 22  20 - 29 mmol/L Final    Calcium 03/11/2019 9.7  8.7 - 10.3 mg/dL Final    Protein, total 03/11/2019 6.7  6.0 - 8.5 g/dL Final    Albumin 03/11/2019 4.3  3.5 - 4.8 g/dL Final    GLOBULIN, TOTAL 03/11/2019 2.4  1.5 - 4.5 g/dL Final    A-G Ratio 03/11/2019 1.8  1.2 - 2.2 Final    Bilirubin, total 03/11/2019 0.5  0.0 - 1.2 mg/dL Final    Alk.  phosphatase 03/11/2019 52  39 - 117 IU/L Final    AST (SGOT) 03/11/2019 20  0 - 40 IU/L Final    ALT (SGPT) 03/11/2019 11  0 - 32 IU/L Final    WBC 03/11/2019 4.4  3.4 - 10.8 x10E3/uL Final    RBC 03/11/2019 4.71  3.77 - 5.28 x10E6/uL Final    HGB 03/11/2019 14.7  11.1 - 15.9 g/dL Final    HCT 03/11/2019 43.8  34.0 - 46.6 % Final    MCV 03/11/2019 93  79 - 97 fL Final    MCH 03/11/2019 31.2  26.6 - 33.0 pg Final    MCHC 03/11/2019 33.6  31.5 - 35.7 g/dL Final    RDW 03/11/2019 12.3  12.3 - 15.4 % Final    PLATELET 82/68/3905 372  150 - 379 x10E3/uL Final     Review of Systems   Constitutional: Negative for malaise/fatigue and weight loss. HENT: Positive for congestion. Negative for sore throat. Eyes: Negative for blurred vision. Respiratory: Negative for cough and shortness of breath. Cardiovascular: Positive for palpitations. Negative for chest pain and leg swelling. Gastrointestinal: Positive for heartburn (occasional). Negative for constipation. Genitourinary: Negative for frequency and urgency. Musculoskeletal: Negative for back pain, joint pain and myalgias. +L knee swelling   Neurological: Negative for dizziness and headaches. Endo/Heme/Allergies: Positive for environmental allergies. Psychiatric/Behavioral: Negative for depression. The patient is nervous/anxious. The patient does not have insomnia. Visit Vitals  /79 (BP 1 Location: Left arm, BP Patient Position: Sitting)   Pulse 96   Temp 98.5 °F (36.9 °C) (Oral)   Resp 16   Ht 5' 9\" (1.753 m)   Wt 211 lb 12.8 oz (96.1 kg)   SpO2 97%   BMI 31.28 kg/m²     Physical Exam  Vitals signs and nursing note reviewed. Constitutional:       General: She is not in acute distress. Appearance: Normal appearance. She is well-developed and well-groomed. She is obese. She is not diaphoretic. HENT:      Right Ear: External ear normal.      Left Ear: External ear normal.   Eyes:      General: No scleral icterus. Right eye: No discharge. Left eye: No discharge.       Extraocular Movements: Extraocular movements intact. Neck:      Musculoskeletal: Normal range of motion and neck supple. Cardiovascular:      Rate and Rhythm: Normal rate and regular rhythm. Pulmonary:      Effort: Pulmonary effort is normal.      Breath sounds: Normal breath sounds. No wheezing. Musculoskeletal:      Right lower leg: No edema. Left lower leg: No edema. Lymphadenopathy:      Cervical: No cervical adenopathy. Neurological:      Mental Status: She is alert and oriented to person, place, and time. Psychiatric:         Mood and Affect: Mood and affect normal.         Behavior: Behavior normal.       ASSESSMENT and PLAN    ICD-10-CM ICD-9-CM    1. Essential hypertension  I10 401.9 VITAMIN D, 25 HYDROXY      CBC WITH AUTOMATED DIFF      METABOLIC PANEL, COMPREHENSIVE      LIPID PANEL      LIPID PANEL      VITAMIN D, 25 HYDROXY      CBC WITH AUTOMATED DIFF      METABOLIC PANEL, COMPREHENSIVE    Ordered fasting labs for pt to complete today in office. Waiting on results. 2. Arthritis  M19.90 716.90 Stable at this time. 3. Environmental and seasonal allergies  J30.89 477.8 She has been having a lot of allergies from ragweed recently. She continues on Singulair and Zyrtec. 4. Anxiety  F41.9 300.00 Stable at this time. Managed with occasional Xanax PRN. 5. Gastroesophageal reflux disease without esophagitis  K21.9 530.81 famotidine (PEPCID) 40 mg tablet sent to pharmacy. I refilled her Pepcid. 6. Vitamin D deficiency  E55.9 268.9 VITAMIN D, 25 HYDROXY      VITAMIN D, 25 HYDROXY    Check vitamin D.      7. Tachycardia  R00.0 785.0 bisoprolol (ZEBETA) 5 mg tablet sent to pharmacy. Potential side effects were discussed. I prescribed Yesicagodwin Dineros for her tachycardia. This plan was reviewed with the patient and patient agrees. All questions were answered. This scribe documentation was reviewed by me and accurately reflects the examination and decisions made by me.     This note will not be viewable in [address]

## 2020-09-25 NOTE — PROGRESS NOTES
Chief Complaint   Patient presents with    Follow Up Chronic Condition     on cholesterol and states she wants  her flu shot today

## 2020-09-25 NOTE — PATIENT INSTRUCTIONS
Vaccine Information Statement Influenza (Flu) Vaccine (Inactivated or Recombinant): What You Need to Know Many Vaccine Information Statements are available in Occitan and other languages. See www.immunize.org/vis Hojas de información sobre vacunas están disponibles en español y en muchos otros idiomas. Visite www.immunize.org/vis 1. Why get vaccinated? Influenza vaccine can prevent influenza (flu). Flu is a contagious disease that spreads around the [facility] every year, usually between October and May. Anyone can get the flu, but it is more dangerous for some people. Infants and young children, people 72years of age and older, pregnant women, and people with certain health conditions or a weakened immune system are at greatest risk of flu complications. Pneumonia, bronchitis, sinus infections and ear infections are examples of flu-related complications. If you have a medical condition, such as heart disease, cancer or diabetes, flu can make it worse. Flu can cause fever and chills, sore throat, muscle aches, fatigue, cough, headache, and runny or stuffy nose. Some people may have vomiting and diarrhea, though this is more common in children than adults. Each year thousands of people in the [facility] die from flu, and many more are hospitalized. Flu vaccine prevents millions of illnesses and flu-related visits to the doctor each year. 2. Influenza vaccines CDC recommends everyone 10months of age and older get vaccinated every flu season. Children 6 months through 6years of age may need 2 doses during a single flu season. Everyone else needs only 1 dose each flu season. It takes about 2 weeks for protection to develop after vaccination. There are many flu viruses, and they are always changing. Each year a new flu vaccine is made to protect against three or four viruses that are likely to cause disease in the upcoming flu season.  Even when the vaccine doesnt exactly match these viruses, it may still provide some protection. Influenza vaccine does not cause flu. Influenza vaccine may be given at the same time as other vaccines. 3. Talk with your health care provider Tell your vaccine provider if the person getting the vaccine: 
 Has had an allergic reaction after a previous dose of influenza vaccine, or has any severe, life-threatening allergies.  Has ever had Guillain-Barré Syndrome (also called GBS). In some cases, your health care provider may decide to postpone influenza vaccination to a future visit. People with minor illnesses, such as a cold, may be vaccinated. People who are moderately or severely ill should usually wait until they recover before getting influenza vaccine. Your health care provider can give you more information. 4. Risks of a reaction  Soreness, redness, and swelling where shot is given, fever, muscle aches, and headache can happen after influenza vaccine.  There may be a very small increased risk of Guillain-Barré Syndrome (GBS) after inactivated influenza vaccine (the flu shot). [facility] children who get the flu shot along with pneumococcal vaccine (PCV13), and/or DTaP vaccine at the same time might be slightly more likely to have a seizure caused by fever. Tell your health care provider if a child who is getting flu vaccine has ever had a seizure. People sometimes faint after medical procedures, including vaccination. Tell your provider if you feel dizzy or have vision changes or ringing in the ears. As with any medicine, there is a very remote chance of a vaccine causing a severe allergic reaction, other serious injury, or death. 5. What if there is a serious problem? An allergic reaction could occur after the vaccinated person leaves the clinic.  If you see signs of a severe allergic reaction (hives, swelling of the face and throat, difficulty breathing, a fast heartbeat, dizziness, or weakness), call 9-1-1 and get the person to the nearest hospital. 
 
For other signs that concern you, call your health care provider. Adverse reactions should be reported to the Vaccine Adverse Event Reporting System (VAERS). Your health care provider will usually file this report, or you can do it yourself. Visit the VAERS website at www.vaers. hhs.gov or call 4-122.224.6306. VAERS is only for reporting reactions, and VAERS staff do not give medical advice. 6. The National Vaccine Injury Compensation Program 
 
The [facility] Vaccine Injury Compensation Program (VICP) is a federal program that was created to compensate people who may have been injured by certain vaccines. Visit the VICP website at www.[facility].gov/vaccinecompensation or call 6-879.717.4987 to learn about the program and about filing a claim. There is a time limit to file a claim for compensation. 7. How can I learn more?  Ask your health care provider.  Call your local or state health department.  Contact the Centers for Disease Control and Prevention (CDC): 
- Call 5-787.940.2185 (8-019-IHW-INFO) or 
- Visit CDCs influenza website at www.cdc.gov/flu Vaccine Information Statement (Interim) Inactivated Influenza Vaccine 8/15/2019 
[address] 273IQ-91 Department of Health and Adm[place] Centers for Disease Control and Prevention Office Use Only

## 2020-09-28 NOTE — PROGRESS NOTES
Let her daughter know Cholesterol came back fine but her Vitamin D levels came back very low. Needs Vitamin D3 2000 I.U daily dose.

## 2020-10-01 DIAGNOSIS — F41.0 ANXIETY ATTACK: ICD-10-CM

## 2020-10-01 DIAGNOSIS — I10 ESSENTIAL HYPERTENSION WITH GOAL BLOOD PRESSURE LESS THAN 130/85: ICD-10-CM

## 2020-10-02 RX ORDER — ALPRAZOLAM 0.5 MG/1
TABLET ORAL
Qty: 10 TAB | Refills: 0 | Status: SHIPPED | OUTPATIENT
Start: 2020-10-02 | End: 2022-04-01 | Stop reason: SDUPTHER

## 2020-10-02 RX ORDER — HYDRALAZINE HYDROCHLORIDE 50 MG/1
TABLET, FILM COATED ORAL
Qty: 270 TAB | Refills: 0 | Status: SHIPPED | OUTPATIENT
Start: 2020-10-02 | End: 2020-12-22

## 2020-10-09 ENCOUNTER — TELEPHONE (OUTPATIENT)
Dept: PRIMARY CARE CLINIC | Age: 80
End: 2020-10-09

## 2020-10-09 DIAGNOSIS — I10 ESSENTIAL HYPERTENSION: ICD-10-CM

## 2020-10-09 DIAGNOSIS — J30.89 ENVIRONMENTAL AND SEASONAL ALLERGIES: ICD-10-CM

## 2020-10-09 RX ORDER — AZELASTINE 1 MG/ML
SPRAY, METERED NASAL
Qty: 1 BOTTLE | Refills: 0 | Status: SHIPPED | OUTPATIENT
Start: 2020-10-09 | End: 2020-12-09

## 2020-10-09 RX ORDER — MONTELUKAST SODIUM 10 MG/1
TABLET ORAL
Qty: 90 TAB | Refills: 0 | Status: SHIPPED | OUTPATIENT
Start: 2020-10-09 | End: 2021-02-22

## 2020-10-09 NOTE — TELEPHONE ENCOUNTER
Dr. Tae Ocampo Pt is requesting something to be called in for her allergies. She is having drainage with sore throat.

## 2020-10-14 ENCOUNTER — TELEPHONE (OUTPATIENT)
Dept: CASE MANAGEMENT | Age: 80
End: 2020-10-14

## 2020-10-14 NOTE — TELEPHONE ENCOUNTER
ACP referral received. Reviewed chart prior to calling pt. Pt stated both daughters are currently visiting her which would be a great opportunity for an ACP conversation. Pt identified Juan Goldberg (socar) and Stiven (oscar) to be decision makers. I spoke briefly with Ms. Rome but she quickly stated she could not talk with me; for, she was on a business call. She would call me back. I supplied pt with my name and telephone number. Reinforced to pt we provide this service in she wishes to take advantage of completing an AMD post conversation. Await a return call or I will reach out again next week.   Giovanny Godwin LCSW

## 2020-10-22 ENCOUNTER — TELEPHONE (OUTPATIENT)
Dept: CASE MANAGEMENT | Age: 80
End: 2020-10-22

## 2020-10-22 NOTE — TELEPHONE ENCOUNTER
Left message on pt's voicemail re: ACP opportunities with my contact info if she wishes to call.   Live Vargas, [facility]

## 2020-11-02 ENCOUNTER — TELEPHONE (OUTPATIENT)
Dept: CASE MANAGEMENT | Age: 80
End: 2020-11-02

## 2020-11-10 DIAGNOSIS — I10 ESSENTIAL HYPERTENSION: ICD-10-CM

## 2020-11-10 RX ORDER — HYDROCHLOROTHIAZIDE 12.5 MG/1
TABLET ORAL
Qty: 90 TAB | Refills: 0 | Status: SHIPPED | OUTPATIENT
Start: 2020-11-10 | End: 2021-02-06

## 2020-11-22 DIAGNOSIS — I10 ESSENTIAL HYPERTENSION: ICD-10-CM

## 2020-11-22 RX ORDER — LOSARTAN POTASSIUM 100 MG/1
TABLET ORAL
Qty: 90 TAB | Refills: 0 | Status: SHIPPED | OUTPATIENT
Start: 2020-11-22 | End: 2021-02-20

## 2020-12-05 DIAGNOSIS — I10 ESSENTIAL HYPERTENSION WITH GOAL BLOOD PRESSURE LESS THAN 130/85: ICD-10-CM

## 2020-12-06 RX ORDER — AMLODIPINE BESYLATE 10 MG/1
TABLET ORAL
Qty: 90 TAB | Refills: 0 | Status: SHIPPED | OUTPATIENT
Start: 2020-12-06 | End: 2021-03-08 | Stop reason: SDUPTHER

## 2020-12-09 DIAGNOSIS — J30.89 ENVIRONMENTAL AND SEASONAL ALLERGIES: ICD-10-CM

## 2020-12-09 RX ORDER — AZELASTINE 1 MG/ML
SPRAY, METERED NASAL
Qty: 1 BOTTLE | Refills: 2 | Status: SHIPPED | OUTPATIENT
Start: 2020-12-09 | End: 2021-05-20

## 2020-12-20 DIAGNOSIS — R00.0 TACHYCARDIA: ICD-10-CM

## 2020-12-20 RX ORDER — BISOPROLOL FUMARATE 5 MG/1
TABLET ORAL
Qty: 30 TAB | Refills: 2 | Status: SHIPPED | OUTPATIENT
Start: 2020-12-20 | End: 2021-03-20

## 2020-12-22 DIAGNOSIS — I10 ESSENTIAL HYPERTENSION WITH GOAL BLOOD PRESSURE LESS THAN 130/85: ICD-10-CM

## 2020-12-22 RX ORDER — HYDRALAZINE HYDROCHLORIDE 50 MG/1
TABLET, FILM COATED ORAL
Qty: 270 TAB | Refills: 0 | Status: SHIPPED | OUTPATIENT
Start: 2020-12-22 | End: 2021-03-10

## 2020-12-31 DIAGNOSIS — K21.9 GASTROESOPHAGEAL REFLUX DISEASE WITHOUT ESOPHAGITIS: ICD-10-CM

## 2020-12-31 RX ORDER — FAMOTIDINE 40 MG/1
TABLET, FILM COATED ORAL
Qty: 90 TAB | Refills: 0 | Status: SHIPPED | OUTPATIENT
Start: 2020-12-31 | End: 2021-03-22

## 2021-02-06 DIAGNOSIS — I10 ESSENTIAL HYPERTENSION: ICD-10-CM

## 2021-02-06 RX ORDER — HYDROCHLOROTHIAZIDE 12.5 MG/1
TABLET ORAL
Qty: 90 TAB | Refills: 0 | Status: SHIPPED | OUTPATIENT
Start: 2021-02-06 | End: 2021-05-05

## 2021-02-20 DIAGNOSIS — I10 ESSENTIAL HYPERTENSION: ICD-10-CM

## 2021-02-20 DIAGNOSIS — J30.89 ENVIRONMENTAL AND SEASONAL ALLERGIES: ICD-10-CM

## 2021-02-20 RX ORDER — LOSARTAN POTASSIUM 100 MG/1
TABLET ORAL
Qty: 90 TAB | Refills: 0 | Status: SHIPPED | OUTPATIENT
Start: 2021-02-20 | End: 2021-05-19

## 2021-02-22 RX ORDER — MONTELUKAST SODIUM 10 MG/1
TABLET ORAL
Qty: 90 TAB | Refills: 0 | Status: SHIPPED | OUTPATIENT
Start: 2021-02-22 | End: 2021-05-24

## 2021-03-08 DIAGNOSIS — I10 ESSENTIAL HYPERTENSION WITH GOAL BLOOD PRESSURE LESS THAN 130/85: ICD-10-CM

## 2021-03-09 RX ORDER — AMLODIPINE BESYLATE 10 MG/1
TABLET ORAL
Qty: 90 TAB | Refills: 0 | Status: SHIPPED | OUTPATIENT
Start: 2021-03-09 | End: 2021-06-16

## 2021-03-10 DIAGNOSIS — I10 ESSENTIAL HYPERTENSION WITH GOAL BLOOD PRESSURE LESS THAN 130/85: ICD-10-CM

## 2021-03-10 RX ORDER — HYDRALAZINE HYDROCHLORIDE 50 MG/1
TABLET, FILM COATED ORAL
Qty: 270 TAB | Refills: 0 | Status: SHIPPED | OUTPATIENT
Start: 2021-03-10 | End: 2021-06-14

## 2021-03-20 DIAGNOSIS — R00.0 TACHYCARDIA: ICD-10-CM

## 2021-03-20 RX ORDER — BISOPROLOL FUMARATE 5 MG/1
TABLET ORAL
Qty: 30 TAB | Refills: 2 | Status: SHIPPED | OUTPATIENT
Start: 2021-03-20 | End: 2021-06-14

## 2021-03-21 DIAGNOSIS — K21.9 GASTROESOPHAGEAL REFLUX DISEASE WITHOUT ESOPHAGITIS: ICD-10-CM

## 2021-03-22 RX ORDER — FAMOTIDINE 40 MG/1
TABLET, FILM COATED ORAL
Qty: 90 TAB | Refills: 0 | Status: SHIPPED | OUTPATIENT
Start: 2021-03-22 | End: 2021-06-16

## 2021-05-05 DIAGNOSIS — I10 ESSENTIAL HYPERTENSION: ICD-10-CM

## 2021-05-05 RX ORDER — HYDROCHLOROTHIAZIDE 12.5 MG/1
TABLET ORAL
Qty: 90 TAB | Refills: 0 | Status: SHIPPED | OUTPATIENT
Start: 2021-05-05 | End: 2021-07-27

## 2021-05-19 DIAGNOSIS — I10 ESSENTIAL HYPERTENSION: ICD-10-CM

## 2021-05-19 DIAGNOSIS — J30.89 ENVIRONMENTAL AND SEASONAL ALLERGIES: ICD-10-CM

## 2021-05-19 RX ORDER — LOSARTAN POTASSIUM 100 MG/1
TABLET ORAL
Qty: 90 TABLET | Refills: 0 | Status: SHIPPED | OUTPATIENT
Start: 2021-05-19 | End: 2021-08-17

## 2021-05-20 RX ORDER — AZELASTINE 1 MG/ML
SPRAY, METERED NASAL
Qty: 1 BOTTLE | Refills: 2 | Status: SHIPPED | OUTPATIENT
Start: 2021-05-20 | End: 2022-05-27 | Stop reason: SDUPTHER

## 2021-05-23 DIAGNOSIS — J30.89 ENVIRONMENTAL AND SEASONAL ALLERGIES: ICD-10-CM

## 2021-05-24 RX ORDER — MONTELUKAST SODIUM 10 MG/1
TABLET ORAL
Qty: 90 TABLET | Refills: 0 | Status: SHIPPED | OUTPATIENT
Start: 2021-05-24 | End: 2021-06-25

## 2021-06-16 DIAGNOSIS — I10 ESSENTIAL HYPERTENSION WITH GOAL BLOOD PRESSURE LESS THAN 130/85: ICD-10-CM

## 2021-06-16 DIAGNOSIS — K21.9 GASTROESOPHAGEAL REFLUX DISEASE WITHOUT ESOPHAGITIS: ICD-10-CM

## 2021-06-16 RX ORDER — AMLODIPINE BESYLATE 10 MG/1
TABLET ORAL
Qty: 90 TABLET | Refills: 0 | Status: SHIPPED | OUTPATIENT
Start: 2021-06-16 | End: 2022-03-08

## 2021-06-16 RX ORDER — AMLODIPINE BESYLATE 10 MG/1
TABLET ORAL
Qty: 90 TABLET | Refills: 0 | Status: SHIPPED | OUTPATIENT
Start: 2021-06-16 | End: 2021-06-25 | Stop reason: SDUPTHER

## 2021-06-16 RX ORDER — FAMOTIDINE 40 MG/1
TABLET, FILM COATED ORAL
Qty: 90 TABLET | Refills: 0 | Status: SHIPPED | OUTPATIENT
Start: 2021-06-16 | End: 2021-09-07

## 2021-06-19 ENCOUNTER — HOSPITAL ENCOUNTER (EMERGENCY)
Age: 81
Discharge: HOME | End: 2021-06-20
Attending: EMERGENCY MEDICINE | Admitting: EMERGENCY MEDICINE
Payer: MEDICARE

## 2021-06-19 VITALS
DIASTOLIC BLOOD PRESSURE: 68 MMHG | RESPIRATION RATE: 16 BRPM | SYSTOLIC BLOOD PRESSURE: 163 MMHG | TEMPERATURE: 97.9 F | HEART RATE: 55 BPM | OXYGEN SATURATION: 99 %

## 2021-06-19 DIAGNOSIS — T58.91XA TOXIC EFFECT OF CARBON MONOXIDE, UNINTENTIONAL, INITIAL ENCOUNTER: Primary | ICD-10-CM

## 2021-06-19 LAB
ALBUMIN SERPL-MCNC: 3.6 G/DL (ref 3.5–5)
ALBUMIN/GLOB SERPL: 1.1 {RATIO} (ref 1.1–2.2)
ALP SERPL-CCNC: 73 U/L (ref 45–117)
ALT SERPL-CCNC: 19 U/L (ref 12–78)
ANION GAP SERPL CALC-SCNC: 9 MMOL/L (ref 5–15)
AST SERPL-CCNC: 11 U/L (ref 15–37)
BASOPHILS # BLD: 0 K/UL (ref 0–0.1)
BASOPHILS NFR BLD: 0 % (ref 0–1)
BDY SITE: ABNORMAL
BILIRUB SERPL-MCNC: 0.5 MG/DL (ref 0.2–1)
BUN SERPL-MCNC: 14 MG/DL (ref 6–20)
BUN/CREAT SERPL: 13 (ref 12–20)
CALCIUM SERPL-MCNC: 9 MG/DL (ref 8.5–10.1)
CHLORIDE SERPL-SCNC: 104 MMOL/L (ref 97–108)
CO2 SERPL-SCNC: 29 MMOL/L (ref 21–32)
COHGB MFR BLD: 0.9 % (ref 1–2)
CREAT SERPL-MCNC: 1.1 MG/DL (ref 0.55–1.02)
DIFFERENTIAL METHOD BLD: ABNORMAL
EOSINOPHIL # BLD: 0.1 K/UL (ref 0–0.4)
EOSINOPHIL NFR BLD: 2 % (ref 0–7)
ERYTHROCYTE [DISTWIDTH] IN BLOOD BY AUTOMATED COUNT: 12.4 % (ref 11.5–14.5)
GLOBULIN SER CALC-MCNC: 3.4 G/DL (ref 2–4)
GLUCOSE SERPL-MCNC: 91 MG/DL (ref 65–100)
HCT VFR BLD AUTO: 39.7 % (ref 35–47)
HGB BLD OXIMETRY-MCNC: 13.5 G/DL (ref 14–17)
HGB BLD-MCNC: 12.7 G/DL (ref 11.5–16)
IMM GRANULOCYTES # BLD AUTO: 0 K/UL (ref 0–0.04)
IMM GRANULOCYTES NFR BLD AUTO: 0 % (ref 0–0.5)
LYMPHOCYTES # BLD: 2 K/UL (ref 0.8–3.5)
LYMPHOCYTES NFR BLD: 44 % (ref 12–49)
MCH RBC QN AUTO: 29.9 PG (ref 26–34)
MCHC RBC AUTO-ENTMCNC: 32 G/DL (ref 30–36.5)
MCV RBC AUTO: 93.4 FL (ref 80–99)
METHGB MFR BLD: 0.1 % (ref 0–1.4)
MONOCYTES # BLD: 0.7 K/UL (ref 0–1)
MONOCYTES NFR BLD: 15 % (ref 5–13)
NEUTS SEG # BLD: 1.8 K/UL (ref 1.8–8)
NEUTS SEG NFR BLD: 39 % (ref 32–75)
NRBC # BLD: 0 K/UL (ref 0–0.01)
NRBC BLD-RTO: 0 PER 100 WBC
OXYHGB MFR BLD: 82.1 % (ref 94–97)
PLATELET # BLD AUTO: 211 K/UL (ref 150–400)
PMV BLD AUTO: 10.2 FL (ref 8.9–12.9)
POTASSIUM SERPL-SCNC: 3.7 MMOL/L (ref 3.5–5.1)
PROT SERPL-MCNC: 7 G/DL (ref 6.4–8.2)
RBC # BLD AUTO: 4.25 M/UL (ref 3.8–5.2)
SAO2 % BLD: 83 % (ref 95–99)
SODIUM SERPL-SCNC: 142 MMOL/L (ref 136–145)
SPECIMEN SITE: ABNORMAL
WBC # BLD AUTO: 4.7 K/UL (ref 3.6–11)

## 2021-06-19 PROCEDURE — 82375 ASSAY CARBOXYHB QUANT: CPT

## 2021-06-19 PROCEDURE — 36415 COLL VENOUS BLD VENIPUNCTURE: CPT

## 2021-06-19 PROCEDURE — 99284 EMERGENCY DEPT VISIT MOD MDM: CPT

## 2021-06-19 PROCEDURE — 80053 COMPREHEN METABOLIC PANEL: CPT

## 2021-06-19 PROCEDURE — 85025 COMPLETE CBC W/AUTO DIFF WBC: CPT

## 2021-06-19 NOTE — ED PROVIDER NOTES
59-year-old female with past medical history significant for hypertension presents with complaints of headache and nausea starting this morning and elevated carbon monoxide levels at home up to 130 yesterday. Patient is accompanied by her daughter with whom she lives. No other members of household. Fire department was called to her house yesterday and confirmed elevated carbon monoxide levels after home detector alarmed. Fire department ventilated home and lowered levels to appropriate and nontoxic levels per patient and family. Gas has been turned off to the house. Repair services has been called to the house to fix problem regarding gas line. Patient reports headache and nausea have resolved since being in the emergency department on an oxygen nonrebreather. No other complaints. Denies any recent illness, fever, chills, chest pain, shortness of breath, abdominal pain, diarrhea, constipation.     Remote tobacco use approximately 20 years ago  Denies drug and alcohol use  Primary care[facility]           Past Medical History:   Diagnosis Date    Anxiety     Arthritis     GERD (gastroesophageal reflux disease)     Hypertension     Psychiatric disorder     anxiety d/o       Past Surgical History:   Procedure Laterality Date    HX GYN      hysterectomy    HX GYN      hysterectomy 1980    HX ORTHOPAEDIC      total left knee    HX ORTHOPAEDIC      knee replacment 2007 left, 2014 right         Family History:   Problem Relation Age of Onset    Stroke Maternal Grandmother     Hypertension Mother     Stroke Mother     Hypertension Brother     Stroke Brother        Social History     Socioeconomic History    Marital status: UNKNOWN     Spouse name: Not on file    Number of children: Not on file    Years of education: Not on file    Highest education level: Not on file   Occupational History    Not on file   Tobacco Use    Smoking status: Former Smoker     Years: 8.00     Quit date: 1/1/1990     Years since quittin.4    Smokeless tobacco: Never Used   Substance and Sexual Activity    Alcohol use: No    Drug use: No    Sexual activity: Not Currently   Other Topics Concern    Not on file   Social History Narrative    ** Merged History Encounter **          Social Determinants of Health     Financial Resource Strain:     Difficulty of Paying Living Expenses:    Food Insecurity:     Worried About Running Out of Food in the Last Year:     Ran Out of Food in the Last Year:    Transportation Needs:     Lack of Transportation (Medical):  Lack of Transportation (Non-Medical):    Physical Activity:     Days of Exercise per Week:     Minutes of Exercise per Session:    Stress:     Feeling of Stress :    Social Connections:     Frequency of Communication with Friends and Family:     Frequency of Social Gatherings with Friends and Family:     Attends Pent[employer] Services:     Active Member of Clubs or Organizations:     Attends Club or Organization Meetings:     Marital Status:    Intimate Partner Violence:     Fear of Current or Ex-Partner:     Emotionally Abused:     Physically Abused:     Sexually Abused: ALLERGIES: Other plant, animal, environmental    Review of Systems   Constitutional: Negative for chills and fever. HENT: Negative for congestion, nosebleeds and rhinorrhea. Eyes: Negative for pain and redness. Respiratory: Negative for cough and shortness of breath. Cardiovascular: Negative for chest pain and palpitations. Gastrointestinal: Positive for nausea. Negative for abdominal pain and vomiting. Genitourinary: Negative for dysuria, frequency, vaginal bleeding and vaginal pain. Musculoskeletal: Negative for myalgias. Skin: Negative for rash and wound. Neurological: Positive for headaches. Negative for seizures, syncope and weakness. Hematological: Does not bruise/bleed easily.    Psychiatric/Behavioral: Negative for agitation, confusion, dysphoric mood and suicidal ideas. The patient is not nervous/anxious. Vitals:    06/19/21 1835   BP: (!) 178/66   Pulse: (!) 59   Resp: 20   Temp: 98 °F (36.7 °C)   SpO2: 100%            Physical Exam  Vitals and nursing note reviewed. Constitutional:       Appearance: She is well-developed. HENT:      Head: Normocephalic and atraumatic. Eyes:      Pupils: Pupils are equal, round, and reactive to light. Neck:      Trachea: No tracheal deviation. Cardiovascular:      Rate and Rhythm: Normal rate and regular rhythm. Heart sounds: Normal heart sounds. Pulmonary:      Effort: Pulmonary effort is normal. No respiratory distress. Breath sounds: Normal breath sounds. No stridor. No wheezing or rales. Chest:      Chest wall: No tenderness. Abdominal:      General: Bowel sounds are normal. There is no distension. Palpations: Abdomen is soft. Tenderness: There is no abdominal tenderness. There is no rebound. Musculoskeletal:         General: No tenderness. Normal range of motion. Cervical back: Normal range of motion and neck supple. Skin:     General: Skin is warm and dry. Coloration: Skin is not pale. Findings: No rash. Neurological:      Mental Status: She is alert and oriented to person, place, and time. Cranial Nerves: No cranial nerve deficit. MDM  Number of Diagnoses or Management Options  Toxic effect of carbon monoxide, unintentional, initial encounter  Diagnosis management comments: 25-year-old female with history of hypertension, migraine headaches presents with complaints of headache and nausea starting this morning after learning that her carbon monoxide levels in her home are elevated to 130 yesterday. Patient is well-appearing, no acute distress, hemodynamically stable, no respiratory distress, speaking complete sentences, clear to auscultation bilaterally, normal room oxygen saturation.   Patient reports symptoms all resolved now while in the emergency department on oxygen nonrebreather. Plan-CBC/CMP/carboxyhemoglobin level. Awaiting  to check carboxyhemoglobin level and send lab to [facility].    CBC and CMP unremarkable       Amount and/or Complexity of Data Reviewed  Clinical lab tests: ordered and reviewed           Procedures    10 PM  Lab error at [facility] and 1 sample from this patient or her daughter was accidentally thrown into the trash. Unknown which sample. New samples required to be sent to [facility]. Discussed with patient and family. They are upset but expressed understanding and will continue to wait for the results. 11:00 PM  New sample sent with  to [facility] for the carboxyhemoglobin. 11:51 PM  COhb 0.9    11:51 PM  Patient's results have been reviewed with them. Patient and/or family have verbally conveyed their understanding and agreement of the patient's signs, symptoms, diagnosis, treatment and prognosis and additionally agree to follow up as recommended or return to the Emergency Room should their condition change prior to follow-up. Discharge instructions have also been provided to the patient with some educational information regarding their diagnosis as well a list of reasons why they would want to return to the ER prior to their follow-up appointment should their condition change.

## 2021-06-19 NOTE — ED TRIAGE NOTES
Patient presents to the emergency department reporting carbon monoxide exposure. Patient's CO detector alerted yesterday, and the fire department was called. Fire confirmed the CO levels were elevated, and the house was ventilated. Patient stayed in the house. Patient reports she had some mild nausea and dizziness this morning.

## 2021-06-23 NOTE — TELEPHONE ENCOUNTER
Left message on pt's voice mail today giving her my contact information for ACP opportunities. I will close referral but will be availability upon her request for a conversation.   Judye Mohs, NUBIA Mart-1 - Positive Histology Text: MART-1 staining demonstrates areas of higher density and clustering of melanocytes with Pagetoid spread upwards within the epidermis. The surgical margins are positive for tumor cells.

## 2021-06-25 ENCOUNTER — OFFICE VISIT (OUTPATIENT)
Dept: PRIMARY CARE CLINIC | Age: 81
End: 2021-06-25
Payer: MEDICARE

## 2021-06-25 VITALS
RESPIRATION RATE: 18 BRPM | DIASTOLIC BLOOD PRESSURE: 68 MMHG | SYSTOLIC BLOOD PRESSURE: 138 MMHG | HEART RATE: 57 BPM | OXYGEN SATURATION: 97 % | TEMPERATURE: 97.8 F | BODY MASS INDEX: 31.01 KG/M2 | WEIGHT: 209.4 LBS | HEIGHT: 69 IN

## 2021-06-25 DIAGNOSIS — F51.5 NIGHTMARES: ICD-10-CM

## 2021-06-25 DIAGNOSIS — I10 ESSENTIAL HYPERTENSION: ICD-10-CM

## 2021-06-25 DIAGNOSIS — R42 INTERMITTENT LIGHTHEADEDNESS: ICD-10-CM

## 2021-06-25 DIAGNOSIS — T58.91XD TOXIC EFFECT OF CARBON MONOXIDE, UNINTENTIONAL, SUBSEQUENT ENCOUNTER: Primary | ICD-10-CM

## 2021-06-25 DIAGNOSIS — R05.9 COUGH: ICD-10-CM

## 2021-06-25 PROCEDURE — G8427 DOCREV CUR MEDS BY ELIG CLIN: HCPCS | Performed by: INTERNAL MEDICINE

## 2021-06-25 PROCEDURE — 1090F PRES/ABSN URINE INCON ASSESS: CPT | Performed by: INTERNAL MEDICINE

## 2021-06-25 PROCEDURE — G8510 SCR DEP NEG, NO PLAN REQD: HCPCS | Performed by: INTERNAL MEDICINE

## 2021-06-25 PROCEDURE — G8399 PT W/DXA RESULTS DOCUMENT: HCPCS | Performed by: INTERNAL MEDICINE

## 2021-06-25 PROCEDURE — 1101F PT FALLS ASSESS-DOCD LE1/YR: CPT | Performed by: INTERNAL MEDICINE

## 2021-06-25 PROCEDURE — G8417 CALC BMI ABV UP PARAM F/U: HCPCS | Performed by: INTERNAL MEDICINE

## 2021-06-25 PROCEDURE — G8536 NO DOC ELDER MAL SCRN: HCPCS | Performed by: INTERNAL MEDICINE

## 2021-06-25 PROCEDURE — G8752 SYS BP LESS 140: HCPCS | Performed by: INTERNAL MEDICINE

## 2021-06-25 PROCEDURE — G8754 DIAS BP LESS 90: HCPCS | Performed by: INTERNAL MEDICINE

## 2021-06-25 PROCEDURE — 99214 OFFICE O/P EST MOD 30 MIN: CPT | Performed by: INTERNAL MEDICINE

## 2021-06-25 RX ORDER — PRAZOSIN HYDROCHLORIDE 1 MG/1
CAPSULE ORAL
Qty: 90 CAPSULE | Refills: 0 | Status: SHIPPED | OUTPATIENT
Start: 2021-06-25 | End: 2021-09-23

## 2021-06-25 RX ORDER — CETIRIZINE HCL 10 MG
1 TABLET ORAL DAILY
COMMUNITY

## 2021-06-25 RX ORDER — PRAZOSIN HYDROCHLORIDE 1 MG/1
1 CAPSULE ORAL
Qty: 30 CAPSULE | Refills: 0 | Status: SHIPPED | OUTPATIENT
Start: 2021-06-25 | End: 2021-06-25

## 2021-06-25 RX ORDER — BENZONATATE 200 MG/1
200 CAPSULE ORAL
Qty: 21 CAPSULE | Refills: 0 | Status: SHIPPED | OUTPATIENT
Start: 2021-06-25 | End: 2021-07-02

## 2021-06-25 NOTE — PROGRESS NOTES
Alexandre Pisano (: 1940) is a 79 y.o. female, established patient, here for evaluation of the following chief complaint(s):  ED Follow-up (carbon monoxide exposure)   Written by Saray Leiva, as dictated by Dr. Ching Hoffman MD.      ASSESSMENT/PLAN:  Below is the assessment and plan developed based on review of pertinent history, physical exam, labs, studies, and medications. 1. Toxic effect of carbon monoxide, unintentional, subsequent encounter  Continue to monitor sxs of carbon monoxide poisoning. Recommend spending time outdoors for fresh air. Recommend keeping windows of home open to improve ventilation, and inspect house for carbon monoxide exposure. 2. Cough  Secondary to carbon monoxide exposure. Prescribed Tessalon 200 mg. Take medication as prescribed. Potential side effects were discussed. -     benzonatate (TESSALON) 200 mg capsule; Take 1 Capsule by mouth three (3) times daily as needed for Cough for up to 7 days. , Normal, Disp-21 Capsule, R-0 sent to pharmacy. 3. Nightmares  Prescribed Minipress 1 mg. Take medication as prescribed. Potential side effects were discussed. -     prazosin (MINIPRESS) 1 mg capsule; Take 1 Capsule by mouth nightly for 30 days. , Normal, Disp-30 Capsule, R-0 sent to pharmacy. 4. Intermittent lightheadedness  Secondary to carbon monoxide exposure. 5. Essential hypertension  Continue taking amlodipine 10 mg, bisoprolol 5 mg, Apresoline 50 mg, losartan 100 mg, and HCTZ 12.5 mg.      SUBJECTIVE/OBJECTIVE:  HPI  Patient presents today for a follow up. She went to the ED on 21 c/o headaches and nausea due to carbon monoxide levels in her kitchen being 130. Her oxyhemoglobin was 82.1% and oxygen saturation was 83%. She still has sx of coughing and dizziness, but she denies any headaches. Her BP today is 138/68. She takes amlodipine, bisoprolol, Apresoline, losartan, and HCTZ for HTN. She checks her BP at home.     She c/o nightmares that wake her up at night with an increased HR. Patient Active Problem List   Diagnosis Code    OA (osteoarthritis) M19.90    HTN (hypertension) I10    Allergic rhinitis J30.9    Gastroesophageal reflux disease without esophagitis K21.9    Anxiety disorder F41.9        Current Outpatient Medications on File Prior to Visit   Medication Sig Dispense Refill    cetirizine (ZYRTEC) 10 mg tablet Take 1 Tablet by mouth daily.  famotidine (PEPCID) 40 mg tablet TAKE 1 TABLET BY MOUTH DAILY 90 Tablet 0    amLODIPine (NORVASC) 10 mg tablet TAKE 1 TABLET BY MOUTH DAILY 90 Tablet 0    bisoprolol (ZEBETA) 5 mg tablet TAKE 1 TABLET BY MOUTH DAILY 90 Tablet 0    hydrALAZINE (APRESOLINE) 50 mg tablet TAKE 1 TABLET BY MOUTH THREE TIMES DAILY 270 Tablet 0    azelastine (ASTELIN) 137 mcg (0.1 %) nasal spray USE 1 SPRAY IN EACH NOSTRIL TWICE DAILY AS DIRECTED 1 Bottle 2    losartan (COZAAR) 100 mg tablet TAKE 1 TABLET BY MOUTH EVERY DAY 90 Tablet 0    hydroCHLOROthiazide (HYDRODIURIL) 12.5 mg tablet TAKE 1 TABLET BY MOUTH DAILY 90 Tab 0    ALPRAZolam (XANAX) 0.5 mg tablet TAKE 1 TABLET BY MOUTH EVERY NIGHT FOR UP TO 10 DAYS AS NEEDED FOR ANXIETY. MAX DAILY AMOUNT: 0.5 MG 10 Tab 0    carbamide peroxide (DEBROX) 6.5 % otic solution Administer 5 Drops into each ear two (2) times a day. 7.5 mL 0    aspirin 81 mg chewable tablet Take 81 mg by mouth daily.  [DISCONTINUED] amLODIPine (NORVASC) 10 mg tablet TAKE 1 TABLET BY MOUTH DAILY 90 Tablet 0    [DISCONTINUED] montelukast (SINGULAIR) 10 mg tablet TAKE 1 TABLET BY MOUTH EVERY DAY 90 Tablet 0     No current facility-administered medications on file prior to visit.        Allergies   Allergen Reactions    Other Plant, Animal, Environmental Runny Nose       Past Medical History:   Diagnosis Date    Anxiety     Arthritis     GERD (gastroesophageal reflux disease)     Hypertension     Psychiatric disorder     anxiety d/o       Past Surgical History:   Procedure Laterality Date    HX GYN      hysterectomy    HX GYN      hysterectomy     HX ORTHOPAEDIC      total left knee    HX ORTHOPAEDIC      knee replacment 2007 left, 2014 right       Family History   Problem Relation Age of Onset    Stroke Maternal Grandmother     Hypertension Mother     Stroke Mother     Hypertension Brother     Stroke Brother        Social History     Socioeconomic History    Marital status:      Spouse name: Not on file    Number of children: Not on file    Years of education: Not on file    Highest education level: Not on file   Occupational History    Not on file   Tobacco Use    Smoking status: Former Smoker     Years: 8.00     Quit date: 1990     Years since quittin.5    Smokeless tobacco: Never Used   Substance and Sexual Activity    Alcohol use: No    Drug use: No    Sexual activity: Not Currently   Other Topics Concern    Not on file   Social History Narrative    ** Merged History Encounter **          Social Determinants of Health     Financial Resource Strain:     Difficulty of Paying Living Expenses:    Food Insecurity:     Worried About Running Out of Food in the Last Year:     [address] in the Last Year:    Transportation Needs:     Lack of Transportation (Medical):      Lack of Transportation (Non-Medical):    Physical Activity:     Days of Exercise per Week:     Minutes of Exercise per Session:    Stress:     Feeling of Stress :    Social Connections:     Frequency of Communication with Friends and Family:     Frequency of Social Gatherings with Friends and Family:     Attends Hoahaoism Services:     Active Member of Clubs or Organizations:     Attends Club or Organization Meetings:     Marital Status:    Intimate Partner Violence:     Fear of Current or Ex-Partner:     Emotionally Abused:     Physically Abused:     Sexually Abused:        Admission on 2021, Discharged on 2021   Component Date Value Ref Range Status    WBC 06/19/2021 4.7  3.6 - 11.0 K/uL Final    RBC 06/19/2021 4.25  3.80 - 5.20 M/uL Final    HGB 06/19/2021 12.7  11.5 - 16.0 g/dL Final    HCT 06/19/2021 39.7  35.0 - 47.0 % Final    MCV 06/19/2021 93.4  80.0 - 99.0 FL Final    MCH 06/19/2021 29.9  26.0 - 34.0 PG Final    MCHC 06/19/2021 32.0  30.0 - 36.5 g/dL Final    RDW 06/19/2021 12.4  11.5 - 14.5 % Final    PLATELET 06/25/0543 512  150 - 400 K/uL Final    MPV 06/19/2021 10.2  8.9 - 12.9 FL Final    NRBC 06/19/2021 0.0  0  WBC Final    ABSOLUTE NRBC 06/19/2021 0.00  0.00 - 0.01 K/uL Final    NEUTROPHILS 06/19/2021 39  32 - 75 % Final    LYMPHOCYTES 06/19/2021 44  12 - 49 % Final    MONOCYTES 06/19/2021 15* 5 - 13 % Final    EOSINOPHILS 06/19/2021 2  0 - 7 % Final    BASOPHILS 06/19/2021 0  0 - 1 % Final    IMMATURE GRANULOCYTES 06/19/2021 0  0.0 - 0.5 % Final    ABS. NEUTROPHILS 06/19/2021 1.8  1.8 - 8.0 K/UL Final    ABS. LYMPHOCYTES 06/19/2021 2.0  0.8 - 3.5 K/UL Final    ABS. MONOCYTES 06/19/2021 0.7  0.0 - 1.0 K/UL Final    ABS. EOSINOPHILS 06/19/2021 0.1  0.0 - 0.4 K/UL Final    ABS. BASOPHILS 06/19/2021 0.0  0.0 - 0.1 K/UL Final    ABS. IMM.  GRANS. 06/19/2021 0.0  0.00 - 0.04 K/UL Final    DF 06/19/2021 AUTOMATED    Final    Sodium 06/19/2021 142  136 - 145 mmol/L Final    Potassium 06/19/2021 3.7  3.5 - 5.1 mmol/L Final    Chloride 06/19/2021 104  97 - 108 mmol/L Final    CO2 06/19/2021 29  21 - 32 mmol/L Final    Anion gap 06/19/2021 9  5 - 15 mmol/L Final    Glucose 06/19/2021 91  65 - 100 mg/dL Final    BUN 06/19/2021 14  6 - 20 MG/DL Final    Creatinine 06/19/2021 1.10* 0.55 - 1.02 MG/DL Final    BUN/Creatinine ratio 06/19/2021 13  12 - 20   Final    GFR est AA 06/19/2021 58* >60 ml/min/1.73m2 Final    GFR est non-AA 06/19/2021 48* >60 ml/min/1.73m2 Final    Estimated GFR is calculated using the IDMS-traceable Modification of Diet in Renal Disease (MDRD) Study equation, reported for both  Americans (GFRAA) and non- Americans (GFRNA), and normalized to 1.73m2 body surface area. The physician must decide which value applies to the patient.  Calcium 06/19/2021 9.0  8.5 - 10.1 MG/DL Final    Bilirubin, total 06/19/2021 0.5  0.2 - 1.0 MG/DL Final    ALT (SGPT) 06/19/2021 19  12 - 78 U/L Final    AST (SGOT) 06/19/2021 11* 15 - 37 U/L Final    Alk. phosphatase 06/19/2021 73  45 - 117 U/L Final    Protein, total 06/19/2021 7.0  6.4 - 8.2 g/dL Final    Albumin 06/19/2021 3.6  3.5 - 5.0 g/dL Final    Globulin 06/19/2021 3.4  2.0 - 4.0 g/dL Final    A-G Ratio 06/19/2021 1.1  1.1 - 2.2   Final    Carboxy-Hgb 06/19/2021 0.9* 1 - 2 % Final    Methemoglobin 06/19/2021 0.1  0 - 1.4 % Final    tHb 06/19/2021 13.5* 14 - 17 g/dL Final    Oxyhemoglobin 06/19/2021 82.1* 94 - 97 % Final    O2 SATURATION 06/19/2021 83* 95 - 99 % Final    SITE 06/19/2021 OTHER    Final    Sample source 06/19/2021 VENOUS BLOOD    Final      Review of Systems   Constitutional: Negative for activity change, fatigue and unexpected weight change. HENT: Negative for congestion, hearing loss, rhinorrhea and sore throat. Eyes: Negative for discharge. Respiratory: Positive for cough. Negative for chest tightness and shortness of breath. Cardiovascular: Negative for leg swelling. Gastrointestinal: Negative for abdominal pain, constipation and diarrhea. Genitourinary: Negative for dysuria, flank pain, frequency and urgency. Musculoskeletal: Negative for arthralgias, back pain and myalgias. Skin: Negative for color change and rash. Neurological: Positive for dizziness. Negative for light-headedness and headaches. Psychiatric/Behavioral: Negative for dysphoric mood and sleep disturbance. The patient is not nervous/anxious.       Visit Vitals  /68 (BP 1 Location: Right arm, BP Patient Position: Sitting) Comment: manual cuff   Pulse (!) 57   Temp 97.8 °F (36.6 °C) (Temporal)   Resp 18   Ht 5' 9\" (1.753 m)   Wt 209 lb 6.4 oz (95 kg)   SpO2 97%   BMI 30.92 kg/m²      Physical Exam  Vitals and nursing note reviewed. Constitutional:       General: She is not in acute distress. Appearance: Normal appearance. She is well-developed. She is not diaphoretic. HENT:      Right Ear: External ear normal.      Left Ear: External ear normal.   Eyes:      General: No scleral icterus. Right eye: No discharge. Left eye: No discharge. Extraocular Movements: Extraocular movements intact. Conjunctiva/sclera: Conjunctivae normal.   Cardiovascular:      Rate and Rhythm: Normal rate and regular rhythm. Pulmonary:      Effort: Pulmonary effort is normal.      Breath sounds: Normal breath sounds. No wheezing. Abdominal:      General: Bowel sounds are normal.      Palpations: Abdomen is soft. Tenderness: There is no abdominal tenderness. Musculoskeletal:      Cervical back: Normal range of motion and neck supple. Lymphadenopathy:      Cervical: No cervical adenopathy. Neurological:      Mental Status: She is alert and oriented to person, place, and time. Psychiatric:         Mood and Affect: Mood and affect normal.           An electronic signature was used to authenticate this note.   -- Ehsan Borden

## 2021-06-25 NOTE — PROGRESS NOTES
Chief Complaint   Patient presents with   [facility] ED Follow-up     carbon monoxide exposure

## 2021-07-09 ENCOUNTER — TELEPHONE (OUTPATIENT)
Dept: PRIMARY CARE CLINIC | Age: 81
End: 2021-07-09

## 2021-07-09 NOTE — TELEPHONE ENCOUNTER
----- Message from [place] Santosh sent at 7/9/2021  7:37 AM EDT -----  Regarding: Dr. Uzair Villalta first and last name: Pt      Reason for call: Requesting medication for sinus infection and ear infection.  [id number] [address] [place] [address] required yes/no and why: Yes      Best contact number(s): [phone]      Details to clarify the request:      Natalia Frost

## 2021-07-09 NOTE — TELEPHONE ENCOUNTER
Returned call to patient. Explained that she will need an appointment. She stated that she did not have transportation today and she would call back tomorrow.  Advised her that since tomorrow is Saturday she should go to an urgent care

## 2021-07-26 DIAGNOSIS — I10 ESSENTIAL HYPERTENSION: ICD-10-CM

## 2021-07-27 RX ORDER — HYDROCHLOROTHIAZIDE 12.5 MG/1
TABLET ORAL
Qty: 90 TABLET | Refills: 0 | Status: SHIPPED | OUTPATIENT
Start: 2021-07-27 | End: 2021-10-15

## 2021-08-17 DIAGNOSIS — I10 ESSENTIAL HYPERTENSION: ICD-10-CM

## 2021-08-17 DIAGNOSIS — J30.89 ENVIRONMENTAL AND SEASONAL ALLERGIES: ICD-10-CM

## 2021-08-17 RX ORDER — MONTELUKAST SODIUM 10 MG/1
TABLET ORAL
Qty: 90 TABLET | Refills: 0 | Status: SHIPPED | OUTPATIENT
Start: 2021-08-17 | End: 2021-11-12

## 2021-08-17 RX ORDER — LOSARTAN POTASSIUM 100 MG/1
TABLET ORAL
Qty: 90 TABLET | Refills: 0 | Status: SHIPPED | OUTPATIENT
Start: 2021-08-17 | End: 2021-11-12

## 2021-09-07 DIAGNOSIS — K21.9 GASTROESOPHAGEAL REFLUX DISEASE WITHOUT ESOPHAGITIS: ICD-10-CM

## 2021-09-07 RX ORDER — FAMOTIDINE 40 MG/1
TABLET, FILM COATED ORAL
Qty: 90 TABLET | Refills: 0 | Status: SHIPPED | OUTPATIENT
Start: 2021-09-07 | End: 2021-12-07

## 2021-09-11 DIAGNOSIS — R00.0 TACHYCARDIA: ICD-10-CM

## 2021-09-11 RX ORDER — BISOPROLOL FUMARATE 5 MG/1
TABLET ORAL
Qty: 90 TABLET | Refills: 0 | Status: SHIPPED | OUTPATIENT
Start: 2021-09-11 | End: 2021-12-07

## 2021-09-12 DIAGNOSIS — I10 ESSENTIAL HYPERTENSION WITH GOAL BLOOD PRESSURE LESS THAN 130/85: ICD-10-CM

## 2021-09-12 RX ORDER — HYDRALAZINE HYDROCHLORIDE 50 MG/1
TABLET, FILM COATED ORAL
Qty: 270 TABLET | Refills: 0 | Status: SHIPPED | OUTPATIENT
Start: 2021-09-12 | End: 2021-12-11

## 2021-09-23 DIAGNOSIS — F51.5 NIGHTMARES: ICD-10-CM

## 2021-09-23 RX ORDER — PRAZOSIN HYDROCHLORIDE 1 MG/1
CAPSULE ORAL
Qty: 90 CAPSULE | Refills: 0 | Status: SHIPPED | OUTPATIENT
Start: 2021-09-23 | End: 2021-12-26

## 2021-10-15 DIAGNOSIS — I10 ESSENTIAL HYPERTENSION: ICD-10-CM

## 2021-10-15 RX ORDER — HYDROCHLOROTHIAZIDE 12.5 MG/1
TABLET ORAL
Qty: 90 TABLET | Refills: 0 | Status: SHIPPED | OUTPATIENT
Start: 2021-10-15 | End: 2022-01-10

## 2021-11-12 DIAGNOSIS — I10 ESSENTIAL HYPERTENSION: ICD-10-CM

## 2021-11-12 DIAGNOSIS — J30.89 ENVIRONMENTAL AND SEASONAL ALLERGIES: ICD-10-CM

## 2021-11-12 RX ORDER — LOSARTAN POTASSIUM 100 MG/1
TABLET ORAL
Qty: 90 TABLET | Refills: 0 | Status: SHIPPED | OUTPATIENT
Start: 2021-11-12 | End: 2022-02-06

## 2021-11-12 RX ORDER — MONTELUKAST SODIUM 10 MG/1
TABLET ORAL
Qty: 90 TABLET | Refills: 0 | Status: SHIPPED | OUTPATIENT
Start: 2021-11-12 | End: 2022-02-06

## 2021-12-07 DIAGNOSIS — R00.0 TACHYCARDIA: ICD-10-CM

## 2021-12-07 DIAGNOSIS — K21.9 GASTROESOPHAGEAL REFLUX DISEASE WITHOUT ESOPHAGITIS: ICD-10-CM

## 2021-12-07 RX ORDER — BISOPROLOL FUMARATE 5 MG/1
TABLET ORAL
Qty: 90 TABLET | Refills: 0 | Status: SHIPPED | OUTPATIENT
Start: 2021-12-07 | End: 2022-03-03

## 2021-12-07 RX ORDER — FAMOTIDINE 40 MG/1
TABLET, FILM COATED ORAL
Qty: 90 TABLET | Refills: 0 | Status: SHIPPED | OUTPATIENT
Start: 2021-12-07 | End: 2022-03-03

## 2021-12-22 DIAGNOSIS — F51.5 NIGHTMARES: ICD-10-CM

## 2021-12-26 RX ORDER — PRAZOSIN HYDROCHLORIDE 1 MG/1
CAPSULE ORAL
Qty: 90 CAPSULE | Refills: 0 | Status: SHIPPED | OUTPATIENT
Start: 2021-12-26 | End: 2022-03-08 | Stop reason: SDUPTHER

## 2022-01-10 DIAGNOSIS — I10 ESSENTIAL HYPERTENSION: ICD-10-CM

## 2022-01-10 RX ORDER — HYDROCHLOROTHIAZIDE 12.5 MG/1
TABLET ORAL
Qty: 90 TABLET | Refills: 0 | Status: SHIPPED | OUTPATIENT
Start: 2022-01-10 | End: 2022-03-08 | Stop reason: ALTCHOICE

## 2022-02-06 DIAGNOSIS — I10 ESSENTIAL HYPERTENSION: ICD-10-CM

## 2022-02-06 DIAGNOSIS — J30.89 ENVIRONMENTAL AND SEASONAL ALLERGIES: ICD-10-CM

## 2022-02-06 RX ORDER — LOSARTAN POTASSIUM 100 MG/1
TABLET ORAL
Qty: 90 TABLET | Refills: 0 | Status: SHIPPED | OUTPATIENT
Start: 2022-02-06 | End: 2022-03-08 | Stop reason: ALTCHOICE

## 2022-02-06 RX ORDER — MONTELUKAST SODIUM 10 MG/1
TABLET ORAL
Qty: 90 TABLET | Refills: 0 | Status: SHIPPED | OUTPATIENT
Start: 2022-02-06 | End: 2022-05-08

## 2022-03-01 ENCOUNTER — TELEPHONE (OUTPATIENT)
Dept: PRIMARY CARE CLINIC | Age: 82
End: 2022-03-01

## 2022-03-01 NOTE — TELEPHONE ENCOUNTER
----- Message from Queta Young sent at 3/1/2022 10:02 AM EST -----  Subject: Medication Problem    QUESTIONS  Name of Medication? hydrALAZINE (APRESOLINE) 50 mg tablet  Patient-reported dosage and instructions? TAKE 1 TABLET BY MOUTH THREE   TIMES DAILY  What question or problem do you have with the medication? Urgent! Pt needs   a call back from the nurse, she said that the medication above is causing   burning sensations all over, and strange dreams, she wanted an appt   tomorrow with her pcp however it shows me the soonest appt is 03/08 and no   virtuals pt req vv  Preferred [facility]acy? [id number] - [place], [address] AT [address] [facility] phone number (if available)? [phone]  Additional Information for Provider? She said that she is only taking this   medication once a day and would like prescribed something else or be   advised if that is okay. she also added that her blood pressure is 138/70   ---------------------------------------------------------------------------  --------------  CALL BACK INFO  What is the best way for the office to contact you? OK to leave message on   voicemail  Preferred Call Back Phone Number? [phone]  ---------------------------------------------------------------------------  --------------  SCRIPT ANSWERS  Relationship to Patient?  Self

## 2022-03-03 DIAGNOSIS — R00.0 TACHYCARDIA: ICD-10-CM

## 2022-03-03 DIAGNOSIS — K21.9 GASTROESOPHAGEAL REFLUX DISEASE WITHOUT ESOPHAGITIS: ICD-10-CM

## 2022-03-03 RX ORDER — FAMOTIDINE 40 MG/1
TABLET, FILM COATED ORAL
Qty: 90 TABLET | Refills: 0 | Status: SHIPPED | OUTPATIENT
Start: 2022-03-03 | End: 2022-06-01

## 2022-03-03 RX ORDER — BISOPROLOL FUMARATE 5 MG/1
TABLET ORAL
Qty: 90 TABLET | Refills: 0 | Status: SHIPPED | OUTPATIENT
Start: 2022-03-03 | End: 2022-03-08

## 2022-03-08 ENCOUNTER — OFFICE VISIT (OUTPATIENT)
Dept: PRIMARY CARE CLINIC | Age: 82
End: 2022-03-08
Payer: MEDICARE

## 2022-03-08 VITALS
TEMPERATURE: 97.8 F | RESPIRATION RATE: 18 BRPM | HEIGHT: 69 IN | HEART RATE: 67 BPM | DIASTOLIC BLOOD PRESSURE: 82 MMHG | OXYGEN SATURATION: 100 % | WEIGHT: 211 LBS | BODY MASS INDEX: 31.25 KG/M2 | SYSTOLIC BLOOD PRESSURE: 152 MMHG

## 2022-03-08 DIAGNOSIS — F43.21 GRIEF REACTION: ICD-10-CM

## 2022-03-08 DIAGNOSIS — E55.9 VITAMIN D DEFICIENCY: ICD-10-CM

## 2022-03-08 DIAGNOSIS — I10 PRIMARY HYPERTENSION: ICD-10-CM

## 2022-03-08 DIAGNOSIS — F51.5 NIGHTMARES: ICD-10-CM

## 2022-03-08 DIAGNOSIS — G89.29 CHRONIC MIDLINE LOW BACK PAIN WITHOUT SCIATICA: ICD-10-CM

## 2022-03-08 DIAGNOSIS — M54.50 CHRONIC MIDLINE LOW BACK PAIN WITHOUT SCIATICA: ICD-10-CM

## 2022-03-08 DIAGNOSIS — T50.905A ADVERSE EFFECT OF DRUG, INITIAL ENCOUNTER: ICD-10-CM

## 2022-03-08 DIAGNOSIS — Z71.89 ACP (ADVANCE CARE PLANNING): ICD-10-CM

## 2022-03-08 DIAGNOSIS — Z00.00 MEDICARE ANNUAL WELLNESS VISIT, SUBSEQUENT: Primary | ICD-10-CM

## 2022-03-08 PROCEDURE — G0439 PPPS, SUBSEQ VISIT: HCPCS | Performed by: INTERNAL MEDICINE

## 2022-03-08 PROCEDURE — G8399 PT W/DXA RESULTS DOCUMENT: HCPCS | Performed by: INTERNAL MEDICINE

## 2022-03-08 PROCEDURE — G8427 DOCREV CUR MEDS BY ELIG CLIN: HCPCS | Performed by: INTERNAL MEDICINE

## 2022-03-08 PROCEDURE — 99214 OFFICE O/P EST MOD 30 MIN: CPT | Performed by: INTERNAL MEDICINE

## 2022-03-08 PROCEDURE — G8510 SCR DEP NEG, NO PLAN REQD: HCPCS | Performed by: INTERNAL MEDICINE

## 2022-03-08 PROCEDURE — G8419 CALC BMI OUT NRM PARAM NOF/U: HCPCS | Performed by: INTERNAL MEDICINE

## 2022-03-08 PROCEDURE — 1090F PRES/ABSN URINE INCON ASSESS: CPT | Performed by: INTERNAL MEDICINE

## 2022-03-08 PROCEDURE — G8753 SYS BP > OR = 140: HCPCS | Performed by: INTERNAL MEDICINE

## 2022-03-08 PROCEDURE — G8754 DIAS BP LESS 90: HCPCS | Performed by: INTERNAL MEDICINE

## 2022-03-08 PROCEDURE — G8536 NO DOC ELDER MAL SCRN: HCPCS | Performed by: INTERNAL MEDICINE

## 2022-03-08 PROCEDURE — 1101F PT FALLS ASSESS-DOCD LE1/YR: CPT | Performed by: INTERNAL MEDICINE

## 2022-03-08 RX ORDER — METOPROLOL SUCCINATE 50 MG/1
50 TABLET, EXTENDED RELEASE ORAL DAILY
Qty: 30 TABLET | Refills: 0 | Status: SHIPPED | OUTPATIENT
Start: 2022-03-08 | End: 2022-03-08

## 2022-03-08 RX ORDER — VALSARTAN AND HYDROCHLOROTHIAZIDE 320; 12.5 MG/1; MG/1
TABLET, FILM COATED ORAL
Qty: 90 TABLET | Refills: 0 | Status: SHIPPED | OUTPATIENT
Start: 2022-03-08 | End: 2022-03-29

## 2022-03-08 RX ORDER — VALSARTAN AND HYDROCHLOROTHIAZIDE 320; 12.5 MG/1; MG/1
1 TABLET, FILM COATED ORAL DAILY
Qty: 30 TABLET | Refills: 0 | Status: SHIPPED | OUTPATIENT
Start: 2022-03-08 | End: 2022-03-08

## 2022-03-08 RX ORDER — PRAZOSIN HYDROCHLORIDE 1 MG/1
1 CAPSULE ORAL
Qty: 90 CAPSULE | Refills: 0 | Status: SHIPPED | OUTPATIENT
Start: 2022-03-08 | End: 2022-04-08 | Stop reason: SDUPTHER

## 2022-03-08 RX ORDER — METOPROLOL SUCCINATE 50 MG/1
TABLET, EXTENDED RELEASE ORAL
Qty: 90 TABLET | Refills: 0 | Status: SHIPPED | OUTPATIENT
Start: 2022-03-08 | End: 2022-03-29

## 2022-03-08 RX ORDER — ACETAMINOPHEN 500 MG
2000 TABLET ORAL DAILY
Qty: 30 CAPSULE | Refills: 2 | Status: SHIPPED | OUTPATIENT
Start: 2022-03-08 | End: 2022-06-08

## 2022-03-08 NOTE — PROGRESS NOTES
Kelsie Stephens is a 79 y.o. female and presents for Annual Medicare Wellness Visit. Assessment of cognitive impairment: Alert and oriented x 3    Depression Screen:   3 most recent PHQ Screens 3/8/2022   Little interest or pleasure in doing things Not at all   Feeling down, depressed, irritable, or hopeless Not at all   Total Score PHQ 2 0       Fall Risk Assessment:    Fall Risk Assessment, last 12 mths 6/25/2021   Able to walk? Yes   Fall in past 12 months? 0   Do you feel unsteady? 0   Are you worried about falling 0   Number of falls in past 12 months -       Abuse Screen:   Abuse Screening Questionnaire 9/25/2020   Do you ever feel afraid of your partner? N   Are you in a relationship with someone who physically or mentally threatens you? N   Is it safe for you to go home? Y       Activities of Daily Living:  Self-care. Requires assistance with: no ADLs  Patient handle his/her own medications  yes Use of pill box  yes  Activities of Daily Living:   ADL Assessment 3/20/2019   Feeding yourself No Help Needed   Getting from bed to chair No Help Needed   Getting dressed No Help Needed   Bathing or showering No Help Needed   Walk across the room (includes cane/walker) No Help Needed   Using the telphone No Help Needed   Taking your medications No Help Needed   Preparing meals No Help Needed   Managing money (expenses/bills) No Help Needed   Moderately strenuous housework (laundry) No Help Needed   Shopping for personal items (toiletries/medicines) No Help Needed   Shopping for groceries No Help Needed   Driving No Help Needed   Climbing a flight of stairs No Help Needed   Getting to places beyond walking distances No Help Needed       Health Maintenance:  Daily Aspirin: yes  Bone Density: up to date  Glaucoma Screening: no  Immunizations:    Tetanus: up to date. Influenza: not up to date -declined. Shingles: up to date. PPSV-23: up to date. Prevnar-13: up to date.  COVID vaccine up to date     Cancer screening:    Cervical: no longer required. Breast: does not want any more . Colon:  up to date - cologuard 3/2019. Alcohol Risk Screen:   On any occasion during the past 3 months, have you had more than 3 drinks(female) or 4 drinks (male) containing alcohol in one? Not applicable  Do you average more than 7 drinks (female) or 14 drinks (male) per week? Not applicable  Type and amount:n/a    Hearing Loss:  Hearing is good. Vision Loss:   Wears glasses, contact lenses, or have any other visual impairment  yes    Adult Nutrition Screen:  No risk factors noted. Advance Care Planning:   End of Life Planning: has NO advanced directive  - add't info requested. Referral to : yes  Sarkis Gill ACP-Facilitator appointment yes      Medications/Allergies: Reviewed with patient  Prior to Admission medications    Medication Sig Start Date End Date Taking? Authorizing Provider   bisoprolol (ZEBETA) 5 mg tablet TAKE 1 TABLET BY MOUTH DAILY 3/3/22 6/1/22 Yes Mitchell Gonzalez MD   famotidine (PEPCID) 40 mg tablet TAKE 1 TABLET BY MOUTH DAILY 3/3/22  Yes Mitchell Gonzalez MD   montelukast (SINGULAIR) 10 mg tablet TAKE 1 TABLET BY MOUTH EVERY DAY 2/6/22  Yes Mitchell Gonzalez MD   losartan (COZAAR) 100 mg tablet TAKE 1 TABLET BY MOUTH EVERY DAY 2/6/22  Yes Mitchell Gonzalez MD   hydroCHLOROthiazide (HYDRODIURIL) 12.5 mg tablet TAKE 1 TABLET BY MOUTH DAILY 1/10/22  Yes Mitchell Gonzalez MD   prazosin (MINIPRESS) 1 mg capsule TAKE 1 CAPSULE BY MOUTH EVERY NIGHT 12/26/21  Yes Mitchell Gonzalez MD   hydrALAZINE (APRESOLINE) 50 mg tablet TAKE 1 TABLET BY MOUTH THREE TIMES DAILY 12/11/21  Yes Mitchell Gonzalez MD   cetirizine (ZYRTEC) 10 mg tablet Take 1 Tablet by mouth daily.    Yes Provider, Historical   azelastine (ASTELIN) 137 mcg (0.1 %) nasal spray USE 1 SPRAY IN EACH NOSTRIL TWICE DAILY AS DIRECTED 5/20/21  Yes Ken Whittington NP   ALPRAZolam (XANAX) 0.5 mg tablet TAKE 1 TABLET BY MOUTH EVERY NIGHT FOR UP TO 10 DAYS AS NEEDED FOR ANXIETY. MAX DAILY AMOUNT: 0.5 MG 10/2/20  Yes Lennox Speaks, MD   carbamide peroxide (DEBROX) 6.5 % otic solution Administer 5 Drops into each ear two (2) times a day. 10/18/19  Yes Noelle Paz NP   aspirin 81 mg chewable tablet Take 81 mg by mouth daily. Yes Provider, Historical   amLODIPine (NORVASC) 10 mg tablet TAKE 1 TABLET BY MOUTH DAILY  Patient not taking: Reported on 3/8/2022 6/16/21   Lennox Speaks, MD       Allergies   Allergen Reactions    Other Plant, Animal, Environmental Runny Nose       Objective:  Visit Vitals  BP (!) 150/90 (BP 1 Location: Left upper arm, BP Patient Position: Sitting) Comment: manual cuff   Pulse 67   Temp 97.8 °F (36.6 °C) (Temporal)   Resp 18   Ht 5' 9\" (1.753 m)   Wt 211 lb (95.7 kg)   SpO2 100%   BMI 31.16 kg/m²    Body mass index is 31.16 kg/m². Problem List: Reviewed with patient and discussed risk factors.     Patient Active Problem List   Diagnosis Code    OA (osteoarthritis) M19.90    HTN (hypertension) I10    Allergic rhinitis J30.9    Gastroesophageal reflux disease without esophagitis K21.9    Anxiety disorder F41.9       PSH: Reviewed with patient  Past Surgical History:   Procedure Laterality Date    HX GYN      hysterectomy    HX GYN      hysterectomy     HX ORTHOPAEDIC      total left knee    HX ORTHOPAEDIC      knee replacment  left, 2014 right        SH: Reviewed with patient  Social History     Tobacco Use    Smoking status: Former Smoker     Years: 8.00     Quit date: 1990     Years since quittin.2    Smokeless tobacco: Never Used   Substance Use Topics    Alcohol use: No    Drug use: No       FH: Reviewed with patient  Family History   Problem Relation Age of Onset    Stroke Maternal Grandmother     Hypertension Mother     Stroke Mother     Hypertension Brother     Stroke Brother        Current medical providers:    Patient Care Team:  Lennox Speaks, MD as PCP - General (Internal Medicine)  Lennox Speaks, MD as PCP - [facility] Empaneled Provider  Paradise Cesar MD as Consulting Provider (Orthopedic Surgery)  Senthil Regan MD (Family Medicine)    Plan:    Diagnoses and all orders for this visit:    Medicare annual wellness visit, subsequent  . Age appropriate Health screening and immunization discussed with patient. She does not want to do mammogram and colonoscopy anymore. ACP (advance care planning)  -     REFERRAL TO ACP CLINICAL SPECIALIST        Health Maintenance   Topic Date Due    Shingrix Vaccine Age 49> (2 of 2) 01/30/2017    Flu Vaccine (1) 09/01/2021    Medicare Yearly Exam  09/26/2021    Depression Screen  06/25/2022    DTaP/Tdap/Td series (3 - Td or Tdap) 12/16/2025    Bone Densitometry (Dexa) Screening  Completed    COVID-19 Vaccine  Completed    Pneumococcal 65+ years  Completed          Urinary/ Fecal Incontinence: none    Regular physical exercise: not at this time due to bilateral knee replacement    Patient verbalized understanding of information presented. AVS and Medicare Part B Preventive Services Table printed and given to pt and reviewed. See table for findings under Recommendation and Scheduled. All of the patient's questions were answered.

## 2022-03-08 NOTE — PROGRESS NOTES
Carl Jeffries (: 1940) is a 79 y.o. female, established patient, here for evaluation of the following chief complaint(s): Annual Wellness Visit (labs) and Hypertension     Written by Augusto Dale, as dictated by Dr. Valerie Gabriel MD.      ASSESSMENT/PLAN:  Below is the assessment and plan developed based on review of pertinent history, physical exam, labs, studies, and medications. 1. Primary hypertension  She had adverse side effects on hydralazine so we will switch her to metoprolol succinate 50 mg daily. We will also switch her from losartan and HCTZ to valsartan-HCTZ 320-12.5 mg daily. Potential side effects were discussed. Labs ordered for medication management.   -     valsartan-hydroCHLOROthiazide (DIOVAN-HCT) 320-12.5 mg per tablet; Take 1 Tablet by mouth daily for 30 days. , Normal, Disp-30 Tablet, R-0 sent to pharmacy. -     metoprolol succinate (TOPROL-XL) 50 mg XL tablet; Take 1 Tablet by mouth daily for 30 days. , Normal, Disp-30 Tablet, R-0 sent to pharmacy.   -     METABOLIC PANEL, COMPREHENSIVE; Future  -     CBC W/O DIFF; Future    2. Vitamin D deficiency  I rx'd vitamin d3 2000 iu daily. -     cholecalciferol (VITAMIN D3) (2,000 UNITS /50 MCG) cap capsule; Take 1 Capsule by mouth daily for 90 days. , Normal, Disp-30 Capsule, R-2 sent to pharmacy. 3. Grief reaction  She has been dealing with the death of her younger brother well overall and has a good support system in her family. 4. Adverse effect of drug, initial encounter  She will discontinue hydralazine. 5. Nightmares  She will discontinue hydralazine. -     prazosin (MINIPRESS) 1 mg capsule; Take 1 Capsule by mouth nightly for 90 days. , Normal, Disp-90 Capsule, R-0 sent to pharmacy. 6. Chronic midline low back pain without sciatica  Recommend otc lidocaine patches. SUBJECTIVE/OBJECTIVE:  HPI   The patient presents today for a routine follow-up. Her younger brother recently passed away.  He had Alzheimer's. She was able to attend his .     She is on losartan 100 mg daily, HCTZ 12.5 mg daily, and hydralazine 50 mg TID for HTN. However, she has only been taking hydralazine once a days because she has been having side effects of burning sensation and strange dreams. Her BP today is elevated at 150/90, 152/82 on manual repeat. She is on prazosin 1 mg QHS for nightmares. She has vitamin d deficiency, but not taking vitamin d3 supplement. She continues to have low back pain. Patient Active Problem List   Diagnosis Code    OA (osteoarthritis) M19.90    HTN (hypertension) I10    Allergic rhinitis J30.9    Gastroesophageal reflux disease without esophagitis K21.9    Anxiety disorder F41.9        Current Outpatient Medications on File Prior to Visit   Medication Sig Dispense Refill    famotidine (PEPCID) 40 mg tablet TAKE 1 TABLET BY MOUTH DAILY 90 Tablet 0    montelukast (SINGULAIR) 10 mg tablet TAKE 1 TABLET BY MOUTH EVERY DAY 90 Tablet 0    cetirizine (ZYRTEC) 10 mg tablet Take 1 Tablet by mouth daily.  azelastine (ASTELIN) 137 mcg (0.1 %) nasal spray USE 1 SPRAY IN EACH NOSTRIL TWICE DAILY AS DIRECTED 1 Bottle 2    ALPRAZolam (XANAX) 0.5 mg tablet TAKE 1 TABLET BY MOUTH EVERY NIGHT FOR UP TO 10 DAYS AS NEEDED FOR ANXIETY. MAX DAILY AMOUNT: 0.5 MG 10 Tab 0    carbamide peroxide (DEBROX) 6.5 % otic solution Administer 5 Drops into each ear two (2) times a day. 7.5 mL 0    aspirin 81 mg chewable tablet Take 81 mg by mouth daily.       [DISCONTINUED] bisoprolol (ZEBETA) 5 mg tablet TAKE 1 TABLET BY MOUTH DAILY 90 Tablet 0    [DISCONTINUED] losartan (COZAAR) 100 mg tablet TAKE 1 TABLET BY MOUTH EVERY DAY 90 Tablet 0    [DISCONTINUED] hydroCHLOROthiazide (HYDRODIURIL) 12.5 mg tablet TAKE 1 TABLET BY MOUTH DAILY 90 Tablet 0    [DISCONTINUED] prazosin (MINIPRESS) 1 mg capsule TAKE 1 CAPSULE BY MOUTH EVERY NIGHT 90 Capsule 0    [DISCONTINUED] hydrALAZINE (APRESOLINE) 50 mg tablet TAKE 1 TABLET BY MOUTH THREE TIMES DAILY 270 Tablet 0    [DISCONTINUED] amLODIPine (NORVASC) 10 mg tablet TAKE 1 TABLET BY MOUTH DAILY (Patient not taking: Reported on 3/8/2022) 90 Tablet 0     No current facility-administered medications on file prior to visit. Allergies   Allergen Reactions    Other Plant, Animal, Environmental Runny Nose       Past Medical History:   Diagnosis Date    Anxiety     Arthritis     GERD (gastroesophageal reflux disease)     Hypertension     Psychiatric disorder     anxiety d/o       Past Surgical History:   Procedure Laterality Date    HX GYN      hysterectomy    HX GYN      hysterectomy     HX ORTHOPAEDIC      total left knee    HX ORTHOPAEDIC      knee replacment  left, 2014 right       Family History   Problem Relation Age of Onset    Stroke Maternal Grandmother     Hypertension Mother     Stroke Mother     Hypertension Brother     Stroke Brother        Social History     Socioeconomic History    Marital status:      Spouse name: Not on file    Number of children: Not on file    Years of education: Not on file    Highest education level: Not on file   Occupational History    Not on file   Tobacco Use    Smoking status: Former Smoker     Years: 8.00     Quit date: 1990     Years since quittin.2    Smokeless tobacco: Never Used   Substance and Sexual Activity    Alcohol use: No    Drug use: No    Sexual activity: Not Currently   Other Topics Concern    Not on file   Social History Narrative    ** Merged History Encounter **            No visits with results within 3 Month(s) from this visit.    Latest known visit with results is:   Admission on 2021, Discharged on 2021   Component Date Value Ref Range Status    WBC 2021 4.7  3.6 - 11.0 K/uL Final    RBC 2021 4.25  3.80 - 5.20 M/uL Final    HGB 2021 12.7  11.5 - 16.0 g/dL Final    HCT 2021 39.7  35.0 - 47.0 % Final    MCV 06/19/2021 93.4  80.0 - 99.0 FL Final    MCH 06/19/2021 29.9  26.0 - 34.0 PG Final    MCHC 06/19/2021 32.0  30.0 - 36.5 g/dL Final    RDW 06/19/2021 12.4  11.5 - 14.5 % Final    PLATELET 63/48/1214 305  150 - 400 K/uL Final    MPV 06/19/2021 10.2  8.9 - 12.9 FL Final    NRBC 06/19/2021 0.0  0  WBC Final    ABSOLUTE NRBC 06/19/2021 0.00  0.00 - 0.01 K/uL Final    NEUTROPHILS 06/19/2021 39  32 - 75 % Final    LYMPHOCYTES 06/19/2021 44  12 - 49 % Final    MONOCYTES 06/19/2021 15* 5 - 13 % Final    EOSINOPHILS 06/19/2021 2  0 - 7 % Final    BASOPHILS 06/19/2021 0  0 - 1 % Final    IMMATURE GRANULOCYTES 06/19/2021 0  0.0 - 0.5 % Final    ABS. NEUTROPHILS 06/19/2021 1.8  1.8 - 8.0 K/UL Final    ABS. LYMPHOCYTES 06/19/2021 2.0  0.8 - 3.5 K/UL Final    ABS. MONOCYTES 06/19/2021 0.7  0.0 - 1.0 K/UL Final    ABS. EOSINOPHILS 06/19/2021 0.1  0.0 - 0.4 K/UL Final    ABS. BASOPHILS 06/19/2021 0.0  0.0 - 0.1 K/UL Final    ABS. IMM. GRANS. 06/19/2021 0.0  0.00 - 0.04 K/UL Final    DF 06/19/2021 AUTOMATED    Final    Sodium 06/19/2021 142  136 - 145 mmol/L Final    Potassium 06/19/2021 3.7  3.5 - 5.1 mmol/L Final    Chloride 06/19/2021 104  97 - 108 mmol/L Final    CO2 06/19/2021 29  21 - 32 mmol/L Final    Anion gap 06/19/2021 9  5 - 15 mmol/L Final    Glucose 06/19/2021 91  65 - 100 mg/dL Final    BUN 06/19/2021 14  6 - 20 MG/DL Final    Creatinine 06/19/2021 1.10* 0.55 - 1.02 MG/DL Final    BUN/Creatinine ratio 06/19/2021 13  12 - 20   Final    GFR est AA 06/19/2021 58* >60 ml/min/1.73m2 Final    GFR est non-AA 06/19/2021 48* >60 ml/min/1.73m2 Final    Estimated GFR is calculated using the IDMS-traceable Modification of Diet in Renal Disease (MDRD) Study equation, reported for both  Americans (GFRAA) and non- Americans (GFRNA), and normalized to 1.73m2 body surface area. The physician must decide which value applies to the patient.     Calcium 06/19/2021 9.0  8.5 - 10.1 MG/DL Final    Bilirubin, total 06/19/2021 0.5  0.2 - 1.0 MG/DL Final    ALT (SGPT) 06/19/2021 19  12 - 78 U/L Final    AST (SGOT) 06/19/2021 11* 15 - 37 U/L Final    Alk. phosphatase 06/19/2021 73  45 - 117 U/L Final    Protein, total 06/19/2021 7.0  6.4 - 8.2 g/dL Final    Albumin 06/19/2021 3.6  3.5 - 5.0 g/dL Final    Globulin 06/19/2021 3.4  2.0 - 4.0 g/dL Final    A-G Ratio 06/19/2021 1.1  1.1 - 2.2   Final    Carboxy-Hgb 06/19/2021 0.9* 1 - 2 % Final    Methemoglobin 06/19/2021 0.1  0 - 1.4 % Final    tHb 06/19/2021 13.5* 14 - 17 g/dL Final    Oxyhemoglobin 06/19/2021 82.1* 94 - 97 % Final    O2 SATURATION 06/19/2021 83* 95 - 99 % Final    SITE 06/19/2021 OTHER    Final    Sample source 06/19/2021 VENOUS BLOOD    Final      Review of Systems   Constitutional: Negative for activity change, fatigue and unexpected weight change. HENT: Negative for congestion, hearing loss, rhinorrhea and sore throat. Eyes: Negative for discharge. Respiratory: Negative for cough, chest tightness and shortness of breath. Cardiovascular: Negative for leg swelling. Gastrointestinal: Negative for abdominal pain, constipation and diarrhea. Genitourinary: Negative for dysuria, flank pain, frequency and urgency. Musculoskeletal: Positive for back pain. Negative for arthralgias and myalgias. Skin: Negative for color change and rash. Neurological: Negative for dizziness, light-headedness and headaches. Psychiatric/Behavioral: Negative for dysphoric mood and sleep disturbance. The patient is not nervous/anxious. Visit Vitals  BP (!) 152/82 (BP 1 Location: Left arm, BP Patient Position: Sitting)   Pulse 67   Temp 97.8 °F (36.6 °C) (Temporal)   Resp 18   Ht 5' 9\" (1.753 m)   Wt 211 lb (95.7 kg)   SpO2 100%   BMI 31.16 kg/m²      Physical Exam  Vitals and nursing note reviewed. Constitutional:       General: She is not in acute distress. Appearance: Normal appearance. She is well-developed.  She is not diaphoretic. HENT:      Right Ear: External ear normal.      Left Ear: External ear normal.   Eyes:      General: No scleral icterus. Right eye: No discharge. Left eye: No discharge. Extraocular Movements: Extraocular movements intact. Conjunctiva/sclera: Conjunctivae normal.   Cardiovascular:      Rate and Rhythm: Normal rate and regular rhythm. Pulmonary:      Effort: Pulmonary effort is normal.      Breath sounds: Normal breath sounds. No wheezing. Abdominal:      General: Bowel sounds are normal.      Palpations: Abdomen is soft. Tenderness: There is no abdominal tenderness. Musculoskeletal:      Cervical back: Normal range of motion and neck supple. Lymphadenopathy:      Cervical: No cervical adenopathy. Neurological:      Mental Status: She is alert and oriented to person, place, and time. Psychiatric:         Mood and Affect: Mood and affect normal.         An electronic signature was used to authenticate this note.   -- Monica Phelan

## 2022-03-09 LAB
ALBUMIN SERPL-MCNC: 4.3 G/DL (ref 3.5–5)
ALBUMIN/GLOB SERPL: 1.3 {RATIO} (ref 1.1–2.2)
ALP SERPL-CCNC: 78 U/L (ref 45–117)
ALT SERPL-CCNC: 21 U/L (ref 12–78)
ANION GAP SERPL CALC-SCNC: 5 MMOL/L (ref 5–15)
AST SERPL-CCNC: 23 U/L (ref 15–37)
BILIRUB SERPL-MCNC: 0.7 MG/DL (ref 0.2–1)
BUN SERPL-MCNC: 13 MG/DL (ref 6–20)
BUN/CREAT SERPL: 13 (ref 12–20)
CALCIUM SERPL-MCNC: 9.7 MG/DL (ref 8.5–10.1)
CHLORIDE SERPL-SCNC: 100 MMOL/L (ref 97–108)
CO2 SERPL-SCNC: 31 MMOL/L (ref 21–32)
COMMENT, HOLDF: NORMAL
CREAT SERPL-MCNC: 0.98 MG/DL (ref 0.55–1.02)
ERYTHROCYTE [DISTWIDTH] IN BLOOD BY AUTOMATED COUNT: 12.8 % (ref 11.5–14.5)
GLOBULIN SER CALC-MCNC: 3.3 G/DL (ref 2–4)
GLUCOSE SERPL-MCNC: 94 MG/DL (ref 65–100)
HCT VFR BLD AUTO: 42.6 % (ref 35–47)
HGB BLD-MCNC: 13.4 G/DL (ref 11.5–16)
MCH RBC QN AUTO: 30.2 PG (ref 26–34)
MCHC RBC AUTO-ENTMCNC: 31.5 G/DL (ref 30–36.5)
MCV RBC AUTO: 95.9 FL (ref 80–99)
NRBC # BLD: 0 K/UL (ref 0–0.01)
NRBC BLD-RTO: 0 PER 100 WBC
PLATELET # BLD AUTO: 207 K/UL (ref 150–400)
PMV BLD AUTO: 11.9 FL (ref 8.9–12.9)
POTASSIUM SERPL-SCNC: 4.1 MMOL/L (ref 3.5–5.1)
PROT SERPL-MCNC: 7.6 G/DL (ref 6.4–8.2)
RBC # BLD AUTO: 4.44 M/UL (ref 3.8–5.2)
SAMPLES BEING HELD,HOLD: NORMAL
SODIUM SERPL-SCNC: 136 MMOL/L (ref 136–145)
WBC # BLD AUTO: 7 K/UL (ref 3.6–11)

## 2022-03-10 ENCOUNTER — PATIENT OUTREACH (OUTPATIENT)
Dept: CASE MANAGEMENT | Age: 82
End: 2022-03-10

## 2022-03-10 NOTE — ACP (ADVANCE CARE PLANNING)
Advance Care Planning   Ambulatory ACP Specialist Patient Outreach    Date:  3/10/2022    ACP Specialist:  Iraida Bernal LPN    Outreach call to patient in follow-up to ACP Specialist referral from:    [x] PCP  [] Provider   [] Ambulatory Care Management [] Other     For:                  [x] Advance Directive Assistance              [] Complete Portable DNR order              [] Complete POST/MOST              [] Code Status Discussion             [] Discuss Goals of Care             [] Early ACP Decision-Making              [] Other (Specify)    Date Referral Received: 3/9/22    Today's Outreach:  [x] First   [] Second  [] Third       Third outreach made by: [] Phone  [] Email / mail    [] [facility]     Intervention:  [] Spoke with Patient   [] Left VM requesting return call      Outcome:   First attempt to contact pt's daughter Estephania Rojas regarding ACP. No answer on mobile phone. VM left requesting a return call. Will attempt 2nd outreach within one week. Next Step:   [] ACP scheduled conversation  [x] Outreach again in one week               [] Email / Mail ACP Info Sheets  [] Email / Mail Advance Directive   [] Closing referral.  Routing closure to referring provider/staff and to ACP Specialist . [] Closure letter mailed to patient with invitation to contact ACP Specialist if / when ready.   Thank you for this referral.

## 2022-03-11 NOTE — PROGRESS NOTES
Let her daughter know blood work including kidney numbers came back fine. Ask her daughter how is her blood pressure readings?

## 2022-03-11 NOTE — PROGRESS NOTES
Spoke with patient. Advised of lab results.  Patient stated that her blood pressure has been really good and she appreciates all you are doing

## 2022-03-15 ENCOUNTER — PATIENT OUTREACH (OUTPATIENT)
Dept: CASE MANAGEMENT | Age: 82
End: 2022-03-15

## 2022-03-15 NOTE — ACP (ADVANCE CARE PLANNING)
Advance Care Planning   Ambulatory ACP Specialist Patient Outreach    Date:  3/15/2022    ACP Specialist:  Abhi Diaz LPN    Outreach call to patient in follow-up to ACP Specialist referral from:    [x] PCP  [] Provider   [] Ambulatory Care Management [] Other     For:                  [x] Advance Directive Assistance              [] Complete Portable DNR order              [] Complete POST/MOST              [] Code Status Discussion             [] Discuss Goals of Care             [] Early ACP Decision-Making              [] Other (Specify)    Date Referral Received: 3/9/22    Today's Outreach:  [] First   [x] Second  [] Third       Third outreach made by: [] Phone  [] Email / mail    [] [facility]     Intervention:  [] Spoke with Patient   [] Left VM requesting return call      Outcome:  Second attempt to contact pt regarding ACP. No answer on daughter's mobile phone. VM left requesting a return call. Will outreach again within one week. Next Step:   [] ACP scheduled conversation  [x] Outreach again in one week               [] Email / Mail ACP Info Sheets  [] Email / Mail Advance Directive   [] Closing referral.  Routing closure to referring provider/staff and to ACP Specialist . [] Closure letter mailed to patient with invitation to contact ACP Specialist if / when ready.   Thank you for this referral.

## 2022-03-21 ENCOUNTER — PATIENT OUTREACH (OUTPATIENT)
Dept: CASE MANAGEMENT | Age: 82
End: 2022-03-21

## 2022-03-21 NOTE — ACP (ADVANCE CARE PLANNING)
Advance Care Planning   Ambulatory ACP Specialist Patient Outreach    Date:  3/21/2022    ACP Specialist:  Tara Guzman LPN    Outreach call to patient in follow-up to ACP Specialist referral from:    [x] PCP  [] Provider   [] Ambulatory Care Management [] Other     For:                  [x] Advance Directive Assistance              [] Complete Portable DNR order              [] Complete POST/MOST              [] Code Status Discussion             [] Discuss Goals of Care             [] Early ACP Decision-Making              [] Other (Specify)    Date Referral Received: 3/9/22    Today's Outreach:  [] First   [] Second  [x] Third       Third outreach made by: [] Phone  [x] Email / mail    [] [facility]     Intervention:  [] Spoke with Patient   [] Left VM requesting return call      Outcome: Final attempt to reach to pt. ACP documents sent to pt via e-mail. LPN's contact information was included. Will close referral at this time. Next Step:   [] ACP scheduled conversation  [] Outreach again in one week               [x] Email / Mail ACP Info Sheets  [] Email / Mail Advance Directive   [x] Closing referral.  Routing closure to referring provider/staff and to ACP Specialist . [x] Closure letter mailed to patient with invitation to contact ACP Specialist if / when ready.   Thank you for this referral.

## 2022-03-26 ENCOUNTER — APPOINTMENT (OUTPATIENT)
Dept: CT IMAGING | Age: 82
DRG: 305 | End: 2022-03-26
Attending: EMERGENCY MEDICINE
Payer: MEDICARE

## 2022-03-26 ENCOUNTER — HOSPITAL ENCOUNTER (INPATIENT)
Age: 82
LOS: 1 days | Discharge: HOME HEALTH | DRG: 305 | End: 2022-03-29
Attending: EMERGENCY MEDICINE | Admitting: STUDENT IN AN ORGANIZED HEALTH CARE EDUCATION/TRAINING PROGRAM
Payer: MEDICARE

## 2022-03-26 ENCOUNTER — APPOINTMENT (OUTPATIENT)
Dept: GENERAL RADIOLOGY | Age: 82
DRG: 305 | End: 2022-03-26
Attending: EMERGENCY MEDICINE
Payer: MEDICARE

## 2022-03-26 DIAGNOSIS — I10 BENIGN ESSENTIAL HTN: ICD-10-CM

## 2022-03-26 DIAGNOSIS — R94.31 ABNORMAL EKG: ICD-10-CM

## 2022-03-26 DIAGNOSIS — R00.1 BRADYCARDIA: ICD-10-CM

## 2022-03-26 DIAGNOSIS — I10 UNCONTROLLED HYPERTENSION: Primary | ICD-10-CM

## 2022-03-26 LAB
ALBUMIN SERPL-MCNC: 3.9 G/DL (ref 3.5–5)
ALBUMIN/GLOB SERPL: 1 {RATIO} (ref 1.1–2.2)
ALP SERPL-CCNC: 86 U/L (ref 45–117)
ALT SERPL-CCNC: 32 U/L (ref 12–78)
ANION GAP SERPL CALC-SCNC: 12 MMOL/L (ref 5–15)
APPEARANCE UR: ABNORMAL
AST SERPL-CCNC: 24 U/L (ref 15–37)
BACTERIA URNS QL MICRO: ABNORMAL /HPF
BASOPHILS # BLD: 0 K/UL (ref 0–0.1)
BASOPHILS NFR BLD: 0 % (ref 0–1)
BILIRUB SERPL-MCNC: 0.8 MG/DL (ref 0.2–1)
BILIRUB UR QL: NEGATIVE
BUN SERPL-MCNC: 14 MG/DL (ref 6–20)
BUN/CREAT SERPL: 13 (ref 12–20)
CALCIUM SERPL-MCNC: 9.5 MG/DL (ref 8.5–10.1)
CHLORIDE SERPL-SCNC: 100 MMOL/L (ref 97–108)
CO2 SERPL-SCNC: 31 MMOL/L (ref 21–32)
COLOR UR: ABNORMAL
CREAT SERPL-MCNC: 1.11 MG/DL (ref 0.55–1.02)
DIFFERENTIAL METHOD BLD: NORMAL
EOSINOPHIL # BLD: 0.1 K/UL (ref 0–0.4)
EOSINOPHIL NFR BLD: 1 % (ref 0–7)
EPITH CASTS URNS QL MICRO: ABNORMAL /LPF
ERYTHROCYTE [DISTWIDTH] IN BLOOD BY AUTOMATED COUNT: 12.2 % (ref 11.5–14.5)
GLOBULIN SER CALC-MCNC: 3.9 G/DL (ref 2–4)
GLUCOSE SERPL-MCNC: 106 MG/DL (ref 65–100)
GLUCOSE UR STRIP.AUTO-MCNC: NEGATIVE MG/DL
HCT VFR BLD AUTO: 44.6 % (ref 35–47)
HGB BLD-MCNC: 14.6 G/DL (ref 11.5–16)
HGB UR QL STRIP: ABNORMAL
IMM GRANULOCYTES # BLD AUTO: 0 K/UL (ref 0–0.04)
IMM GRANULOCYTES NFR BLD AUTO: 0 % (ref 0–0.5)
KETONES UR QL STRIP.AUTO: NEGATIVE MG/DL
LEUKOCYTE ESTERASE UR QL STRIP.AUTO: ABNORMAL
LYMPHOCYTES # BLD: 2.1 K/UL (ref 0.8–3.5)
LYMPHOCYTES NFR BLD: 36 % (ref 12–49)
MCH RBC QN AUTO: 30.4 PG (ref 26–34)
MCHC RBC AUTO-ENTMCNC: 32.7 G/DL (ref 30–36.5)
MCV RBC AUTO: 92.9 FL (ref 80–99)
MONOCYTES # BLD: 0.5 K/UL (ref 0–1)
MONOCYTES NFR BLD: 9 % (ref 5–13)
NEUTS SEG # BLD: 3.1 K/UL (ref 1.8–8)
NEUTS SEG NFR BLD: 54 % (ref 32–75)
NITRITE UR QL STRIP.AUTO: NEGATIVE
NRBC # BLD: 0 K/UL (ref 0–0.01)
NRBC BLD-RTO: 0 PER 100 WBC
OTHER,OTHU: ABNORMAL
PH UR STRIP: 7 [PH] (ref 5–8)
PLATELET # BLD AUTO: 214 K/UL (ref 150–400)
PMV BLD AUTO: 11.6 FL (ref 8.9–12.9)
POTASSIUM SERPL-SCNC: 3.7 MMOL/L (ref 3.5–5.1)
PROT SERPL-MCNC: 7.8 G/DL (ref 6.4–8.2)
PROT UR STRIP-MCNC: ABNORMAL MG/DL
RBC # BLD AUTO: 4.8 M/UL (ref 3.8–5.2)
RBC #/AREA URNS HPF: ABNORMAL /HPF (ref 0–5)
SODIUM SERPL-SCNC: 143 MMOL/L (ref 136–145)
SP GR UR REFRACTOMETRY: 1.01 (ref 1–1.03)
TROPONIN-HIGH SENSITIVITY: 8 NG/L (ref 0–51)
UA: UC IF INDICATED,UAUC: ABNORMAL
UROBILINOGEN UR QL STRIP.AUTO: 1 EU/DL (ref 0.2–1)
WBC # BLD AUTO: 5.8 K/UL (ref 3.6–11)
WBC URNS QL MICRO: ABNORMAL /HPF (ref 0–4)

## 2022-03-26 PROCEDURE — 70450 CT HEAD/BRAIN W/O DYE: CPT

## 2022-03-26 PROCEDURE — G0378 HOSPITAL OBSERVATION PER HR: HCPCS

## 2022-03-26 PROCEDURE — 84484 ASSAY OF TROPONIN QUANT: CPT

## 2022-03-26 PROCEDURE — 85025 COMPLETE CBC W/AUTO DIFF WBC: CPT

## 2022-03-26 PROCEDURE — 74011000250 HC RX REV CODE- 250: Performed by: HOSPITALIST

## 2022-03-26 PROCEDURE — 81001 URINALYSIS AUTO W/SCOPE: CPT

## 2022-03-26 PROCEDURE — 93005 ELECTROCARDIOGRAM TRACING: CPT

## 2022-03-26 PROCEDURE — 74011000250 HC RX REV CODE- 250: Performed by: EMERGENCY MEDICINE

## 2022-03-26 PROCEDURE — 36415 COLL VENOUS BLD VENIPUNCTURE: CPT

## 2022-03-26 PROCEDURE — 87086 URINE CULTURE/COLONY COUNT: CPT

## 2022-03-26 PROCEDURE — 80053 COMPREHEN METABOLIC PANEL: CPT

## 2022-03-26 PROCEDURE — 99285 EMERGENCY DEPT VISIT HI MDM: CPT

## 2022-03-26 PROCEDURE — 74011250637 HC RX REV CODE- 250/637: Performed by: FAMILY MEDICINE

## 2022-03-26 PROCEDURE — 96376 TX/PRO/DX INJ SAME DRUG ADON: CPT

## 2022-03-26 PROCEDURE — 96374 THER/PROPH/DIAG INJ IV PUSH: CPT

## 2022-03-26 PROCEDURE — 71045 X-RAY EXAM CHEST 1 VIEW: CPT

## 2022-03-26 RX ORDER — LOSARTAN POTASSIUM 50 MG/1
100 TABLET ORAL DAILY
Status: DISCONTINUED | OUTPATIENT
Start: 2022-03-27 | End: 2022-03-29 | Stop reason: HOSPADM

## 2022-03-26 RX ORDER — MONTELUKAST SODIUM 10 MG/1
10 TABLET ORAL DAILY
Status: DISCONTINUED | OUTPATIENT
Start: 2022-03-27 | End: 2022-03-29 | Stop reason: HOSPADM

## 2022-03-26 RX ORDER — GUAIFENESIN 100 MG/5ML
81 LIQUID (ML) ORAL DAILY
Status: DISCONTINUED | OUTPATIENT
Start: 2022-03-27 | End: 2022-03-29 | Stop reason: HOSPADM

## 2022-03-26 RX ORDER — PRAZOSIN HYDROCHLORIDE 1 MG/1
1 CAPSULE ORAL 2 TIMES DAILY
Status: DISCONTINUED | OUTPATIENT
Start: 2022-03-26 | End: 2022-03-26

## 2022-03-26 RX ORDER — CETIRIZINE HCL 10 MG
10 TABLET ORAL DAILY
Status: DISCONTINUED | OUTPATIENT
Start: 2022-03-27 | End: 2022-03-29 | Stop reason: HOSPADM

## 2022-03-26 RX ORDER — MELATONIN
2000 DAILY
Status: DISCONTINUED | OUTPATIENT
Start: 2022-03-27 | End: 2022-03-29 | Stop reason: HOSPADM

## 2022-03-26 RX ORDER — AZELASTINE 1 MG/ML
1 SPRAY, METERED NASAL 2 TIMES DAILY
Status: DISCONTINUED | OUTPATIENT
Start: 2022-03-27 | End: 2022-03-29 | Stop reason: HOSPADM

## 2022-03-26 RX ORDER — LABETALOL HYDROCHLORIDE 5 MG/ML
10 INJECTION, SOLUTION INTRAVENOUS
Status: DISCONTINUED | OUTPATIENT
Start: 2022-03-26 | End: 2022-03-28

## 2022-03-26 RX ORDER — PRAZOSIN HYDROCHLORIDE 1 MG/1
1 CAPSULE ORAL 2 TIMES DAILY
Status: DISCONTINUED | OUTPATIENT
Start: 2022-03-26 | End: 2022-03-27

## 2022-03-26 RX ORDER — HYDROCHLOROTHIAZIDE 25 MG/1
12.5 TABLET ORAL DAILY
Status: DISCONTINUED | OUTPATIENT
Start: 2022-03-27 | End: 2022-03-26

## 2022-03-26 RX ORDER — PRAZOSIN HYDROCHLORIDE 1 MG/1
1 CAPSULE ORAL
Status: DISCONTINUED | OUTPATIENT
Start: 2022-03-26 | End: 2022-03-26

## 2022-03-26 RX ORDER — CHLORTHALIDONE 25 MG/1
25 TABLET ORAL DAILY
Status: DISCONTINUED | OUTPATIENT
Start: 2022-03-27 | End: 2022-03-29 | Stop reason: HOSPADM

## 2022-03-26 RX ORDER — AMLODIPINE BESYLATE 5 MG/1
5 TABLET ORAL DAILY
Status: DISCONTINUED | OUTPATIENT
Start: 2022-03-27 | End: 2022-03-26

## 2022-03-26 RX ORDER — FAMOTIDINE 20 MG/1
40 TABLET, FILM COATED ORAL DAILY
Status: DISCONTINUED | OUTPATIENT
Start: 2022-03-27 | End: 2022-03-29 | Stop reason: HOSPADM

## 2022-03-26 RX ORDER — AMLODIPINE BESYLATE 5 MG/1
5 TABLET ORAL DAILY
Status: DISCONTINUED | OUTPATIENT
Start: 2022-03-26 | End: 2022-03-27

## 2022-03-26 RX ORDER — HYDROCHLOROTHIAZIDE 25 MG/1
25 TABLET ORAL DAILY
Status: DISCONTINUED | OUTPATIENT
Start: 2022-03-27 | End: 2022-03-26

## 2022-03-26 RX ORDER — LABETALOL HYDROCHLORIDE 5 MG/ML
10 INJECTION, SOLUTION INTRAVENOUS ONCE
Status: COMPLETED | OUTPATIENT
Start: 2022-03-26 | End: 2022-03-26

## 2022-03-26 RX ORDER — METOPROLOL SUCCINATE 50 MG/1
50 TABLET, EXTENDED RELEASE ORAL DAILY
Status: DISCONTINUED | OUTPATIENT
Start: 2022-03-27 | End: 2022-03-27

## 2022-03-26 RX ADMIN — AMLODIPINE BESYLATE 5 MG: 5 TABLET ORAL at 23:42

## 2022-03-26 RX ADMIN — LABETALOL HYDROCHLORIDE 10 MG: 5 INJECTION INTRAVENOUS at 16:00

## 2022-03-26 RX ADMIN — LABETALOL HYDROCHLORIDE 10 MG: 5 INJECTION INTRAVENOUS at 11:43

## 2022-03-26 RX ADMIN — PRAZOSIN HYDROCHLORIDE 1 MG: 1 CAPSULE ORAL at 23:42

## 2022-03-26 NOTE — ED PROVIDER NOTES
Patient is here for lightheadedness and uncontrolled hypertension. She states that she has a longstanding history of hypertension. About 2 weeks ago that her blood pressure medications changed. Since then her blood pressure at home has been running around 912 systolic. Normally is about 033 systolic. Past few days she has been lightheaded and feeling unsteady with walking. No headaches blurry vision double vision seeing spots. No chest pain or shortness of breath. No paresthesias numbness nausea vomiting. No falls or trauma. No history of head bleeds. Not on blood thinners. Last took her blood pressure medication today. Has no other complaints. Blood pressure this morning has been between 836 273 systolic at home. Denies any new amount of caffeine. Denies alcohol tobacco illegal drugs.            Past Medical History:   Diagnosis Date    Anxiety     Arthritis     GERD (gastroesophageal reflux disease)     Hypertension     Psychiatric disorder     anxiety d/o       Past Surgical History:   Procedure Laterality Date    HX GYN      hysterectomy    HX GYN      hysterectomy     HX ORTHOPAEDIC      total left knee    HX ORTHOPAEDIC      knee replacment  left, 2014 right         Family History:   Problem Relation Age of Onset    Stroke Maternal Grandmother     Hypertension Mother     Stroke Mother     Hypertension Brother     Stroke Brother        Social History     Socioeconomic History    Marital status:      Spouse name: Not on file    Number of children: Not on file    Years of education: Not on file    Highest education level: Not on file   Occupational History    Not on file   Tobacco Use    Smoking status: Former Smoker     Years: 8.00     Quit date: 1990     Years since quittin.2    Smokeless tobacco: Never Used   Substance and Sexual Activity    Alcohol use: No    Drug use: No    Sexual activity: Not Currently   Other Topics Concern    Not on file Social History Narrative    ** Merged History Encounter **          Social Determinants of Health     Financial Resource Strain:     Difficulty of Paying Living Expenses: Not on file   Food Insecurity:     Worried About Running Out of Food in the Last Year: Not on file    Ronnell of Food in the Last Year: Not on file   Transportation Needs:     Lack of Transportation (Medical): Not on file    Lack of Transportation (Non-Medical): Not on file   Physical Activity:     Days of Exercise per Week: Not on file    Minutes of Exercise per Session: Not on file   Stress:     Feeling of Stress : Not on file   Social Connections:     Frequency of Communication with Friends and Family: Not on file    Frequency of Social Gatherings with Friends and Family: Not on file    Attends Hinduism Services: Not on file    Active Member of [address] [place] [address] or Organizations: Not on file    Attends Club or Organization Meetings: Not on file    Marital Status: Not on file   Intimate Partner Violence:     Fear of Current or Ex-Partner: Not on file    Emotionally Abused: Not on file    Physically Abused: Not on file    Sexually Abused: Not on file   Housing Stability:     Unable to Pay for Housing in the Last Year: Not on file    Number of [place] in the Last Year: Not on file    Unstable Housing in the Last Year: Not on file         ALLERGIES: Hydralazine and Other plant, animal, environmental    Review of Systems   Constitutional: Negative for chills, fatigue and fever. HENT: Negative for rhinorrhea and sore throat. Eyes: Negative for photophobia and pain. Respiratory: Negative for cough, shortness of breath and wheezing. Cardiovascular: Negative for chest pain, palpitations and leg swelling. Gastrointestinal: Negative for abdominal pain, constipation, diarrhea, nausea and vomiting. Genitourinary: Negative for dysuria, flank pain and hematuria.    Musculoskeletal: Negative for arthralgias, back pain, gait problem, joint swelling, myalgias, neck pain and neck stiffness. Skin: Negative for color change, pallor, rash and wound. Neurological: Positive for light-headedness. Negative for dizziness, tremors, syncope, speech difficulty, weakness, numbness and headaches. Vitals:    03/26/22 1152 03/26/22 1230 03/26/22 1245 03/26/22 1300   BP: (!) 171/95 (!) 214/106 (!) 188/105 (!) 169/97   Pulse: 93 95 97 93   Resp: 19 19 17 23   Temp:       SpO2: 98% 98% 97% 94%   Weight:       Height:                Physical Exam  Vitals and nursing note reviewed. Exam conducted with a chaperone present. Constitutional:       General: She is not in acute distress. Appearance: Normal appearance. She is normal weight. She is not ill-appearing, toxic-appearing or diaphoretic. HENT:      Head: Normocephalic and atraumatic. Right Ear: External ear normal. There is impacted cerumen. Left Ear: External ear normal. There is impacted cerumen. Nose: Nose normal. No congestion or rhinorrhea. Mouth/Throat:      Mouth: Mucous membranes are moist.      Pharynx: Oropharynx is clear. No oropharyngeal exudate or posterior oropharyngeal erythema. Eyes:      General: No scleral icterus. Right eye: No discharge. Left eye: No discharge. Extraocular Movements: Extraocular movements intact. Conjunctiva/sclera: Conjunctivae normal.      Pupils: Pupils are equal, round, and reactive to light. Comments: Pupils 3 mm and reactive bilaterally. No nystagmus. No proptosis. Neck:      Vascular: No carotid bruit. Cardiovascular:      Rate and Rhythm: Normal rate and regular rhythm. Pulses: Normal pulses. Heart sounds: Normal heart sounds. Pulmonary:      Effort: Pulmonary effort is normal.      Breath sounds: Normal breath sounds. Abdominal:      General: Abdomen is flat. Bowel sounds are normal. There is no distension. Palpations: Abdomen is soft. There is no mass. Tenderness: There is no abdominal tenderness. There is no guarding or rebound. Hernia: No hernia is present. Musculoskeletal:         General: No swelling, tenderness, deformity or signs of injury. Normal range of motion. Cervical back: Normal range of motion and neck supple. No rigidity or tenderness. Right lower leg: No edema. Left lower leg: No edema. Lymphadenopathy:      Cervical: No cervical adenopathy. Skin:     General: Skin is warm and dry. Capillary Refill: Capillary refill takes less than 2 seconds. Neurological:      General: No focal deficit present. Mental Status: She is alert and oriented to person, place, and time. Mental status is at baseline. Cranial Nerves: No cranial nerve deficit. Sensory: No sensory deficit. Motor: No weakness. Psychiatric:         Mood and Affect: Mood normal.         Behavior: Behavior normal.          [facility]  ED Course as of 03/26/22 1324   Sat Mar 26, 2022   1133 Patient examined. Patient without any complaints. Is hypertensive here without any other vital sign abnormalities. Will give labetalol. Blood pressure currently 200/105. Will get CT head to evaluate for intracranial process. There are no cranial nerve deficits or focal neurologic deficits on my exam at this time. EKG with normal sinus rhythm rate 90 first-degree AV block nonspecific intraventricular conduction delay, nonspecific ST changes with some ST depression in the V4 through V6 leads. Will get troponin. [AF]   1155 CBC WITH AUTOMATED DIFF:    WBC 5.8   RBC 4.80   HGB 14.6   HCT 44.6   MCV 92.9   MCH 30.4   MCHC 32.7   RDW 12.2   PLATELET 717   MPV 84.1   NRBC 0.0   ABSOLUTE NRBC 0.00   NEUTROPHILS 54   LYMPHOCYTES 36   MONOCYTES 9   EOSINOPHILS 1   BASOPHILS 0   IMMATURE GRANULOCYTES 0   ABS. NEUTROPHILS 3.1   ABS. LYMPHOCYTES 2.1   ABS. MONOCYTES 0.5   ABS. EOSINOPHILS 0.1   ABS. BASOPHILS 0.0   ABS. IMM.  GRANS. 0.0   DF AUTOMATED [AF]   1210 COMPREHENSIVE METABOLIC PANEL(!):    Sodium 143   Potassium 3.7   Chloride 100   CO2 31   Anion gap 12   Glucose 106(!)   BUN 14   Creatinine 1.11(!)   BUN/Creatinine ratio 13   GFR est AA 57(!)   GFR est non-AA 47(!)   Calcium 9.5   Bilirubin, total 0.8   ALT 32   AST 24   Alk. phosphatase 86   Protein, total 7.8   Albumin 3.9   Globulin 3.9   A-G Ratio 1.0(!) [AF]   1256 CT HEAD WO CONT [AF]   1256 XR CHEST PORT [AF]   9840 TROPONIN-HIGH SENSITIVITY:    Troponin-High Sensitivity 8 [AF]   1256 URINALYSIS W/ REFLEX CULTURE(!):    Color YELLOW/STRAW   Appearance HAZY(!)   Specific gravity 1.015   pH (UA) 7.0   Protein TRACE(!)   Glucose Negative   Ketone Negative   Bilirubin Negative   Blood SMALL(!)   Urobilinogen 1.0   Nitrites Negative   Leukocyte Esterase MODERATE(!)   WBC 10-20   RBC 5-10   Epithelial cells MANY(!)   Bacteria 1+(!)   UA:UC IF INDICATED URINE CULTURE ORDERED   Other: Renal Epithelial cells Present [AF]   1319 Repeat EKG shows sinus rhythm rate 92 first-degree AV block ST depressions in lateral leads. Will reassess patient. [AF]   2524 Patient rechecked. Patient no acute distress. Blood pressure has improved. She was up to ambulate and still felt a little lightheaded. Does have ST depressions. In the light of uncontrolled hypertension with new EKG findings and being symptomatic will call for admission. [AF]      ED Course User Index  [AF] DO Katie Leonard Serve Consult for Admission  1:24 PM    ED Room Number: SER08/08  Patient Name and age: [place] 79 y.o.  female  Working Diagnosis:   1. Uncontrolled hypertension    2. Abnormal EKG        COVID-19 Suspicion:  no  Sepsis present:  no  Reassessment needed: yes  Code Status:  Full Code  Readmission: no  Isolation Requirements:  no  Recommended Level of Care:  telemetry  Department:[place] [facility] - [phone]  Other:  Symptomatic uncontrolled hypertension. Lightheaded. EKG with ST depression in lateral leads.  Normal troponin. Symptoms occur with ambulation. Has gotten labetalol.       Procedures

## 2022-03-26 NOTE — ED NOTES
TRANSFER - OUT REPORT:    Verbal report given to Jeramy Michael RN(name) on Carl Stagers  being transferred to [facility](unit) for routine progression of care       Report consisted of patients Situation, Background, Assessment and   Recommendations(SBAR). Information from the following report(s) SBAR and ED Summary was reviewed with the receiving nurse. Lines:   Peripheral IV 03/26/22 Right Antecubital (Active)   Site Assessment Clean, dry, & intact 03/26/22 1132   Phlebitis Assessment 0 03/26/22 1132   Infiltration Assessment 0 03/26/22 1132   Dressing Status Clean, dry, & intact 03/26/22 1132   Dressing Type 4 X 4 03/26/22 1132        Opportunity for questions and clarification was provided.       Patient transported with:   Monitor

## 2022-03-26 NOTE — ED TRIAGE NOTES
Pt presents reporting elevated blood pressure, after measuring it at home this am. The systolic number was 033, but she does not remember the diastolic number. She also says that she is lightheaded.

## 2022-03-26 NOTE — ED NOTES
Discussed with Dr Noel Arriola pt's HR fluctuations, repeat ECG performed. PT denies any symptoms during episodes of bradycardia.

## 2022-03-26 NOTE — ED NOTES
PT reports feeling \"better. \"  She was able to ambulate to restroom and back to treatment room without assistance or difficulty.

## 2022-03-27 ENCOUNTER — APPOINTMENT (OUTPATIENT)
Dept: VASCULAR SURGERY | Age: 82
DRG: 305 | End: 2022-03-27
Attending: FAMILY MEDICINE
Payer: MEDICARE

## 2022-03-27 LAB
ANION GAP SERPL CALC-SCNC: 6 MMOL/L (ref 5–15)
ATRIAL RATE: 227 BPM
ATRIAL RATE: 90 BPM
ATRIAL RATE: 93 BPM
BACTERIA SPEC CULT: NORMAL
BUN SERPL-MCNC: 11 MG/DL (ref 6–20)
BUN/CREAT SERPL: 12 (ref 12–20)
CALCIUM SERPL-MCNC: 8.9 MG/DL (ref 8.5–10.1)
CALCULATED P AXIS, ECG09: 107 DEGREES
CALCULATED P AXIS, ECG09: 23 DEGREES
CALCULATED R AXIS, ECG10: 12 DEGREES
CALCULATED R AXIS, ECG10: 171 DEGREES
CALCULATED R AXIS, ECG10: 32 DEGREES
CALCULATED T AXIS, ECG11: -122 DEGREES
CALCULATED T AXIS, ECG11: -77 DEGREES
CALCULATED T AXIS, ECG11: -99 DEGREES
CHLORIDE SERPL-SCNC: 103 MMOL/L (ref 97–108)
CHOLEST SERPL-MCNC: 126 MG/DL
CO2 SERPL-SCNC: 30 MMOL/L (ref 21–32)
CREAT SERPL-MCNC: 0.95 MG/DL (ref 0.55–1.02)
DIAGNOSIS, 93000: NORMAL
ERYTHROCYTE [DISTWIDTH] IN BLOOD BY AUTOMATED COUNT: 12.5 % (ref 11.5–14.5)
EST. AVERAGE GLUCOSE BLD GHB EST-MCNC: 103 MG/DL
GLUCOSE SERPL-MCNC: 110 MG/DL (ref 65–100)
HBA1C MFR BLD: 5.2 % (ref 4–5.6)
HCT VFR BLD AUTO: 41.8 % (ref 35–47)
HDLC SERPL-MCNC: 61 MG/DL
HDLC SERPL: 2.1 {RATIO} (ref 0–5)
HGB BLD-MCNC: 13.5 G/DL (ref 11.5–16)
LDLC SERPL CALC-MCNC: 53.4 MG/DL (ref 0–100)
MAGNESIUM SERPL-MCNC: 1.9 MG/DL (ref 1.6–2.4)
MCH RBC QN AUTO: 30.3 PG (ref 26–34)
MCHC RBC AUTO-ENTMCNC: 32.3 G/DL (ref 30–36.5)
MCV RBC AUTO: 93.9 FL (ref 80–99)
NRBC # BLD: 0 K/UL (ref 0–0.01)
NRBC BLD-RTO: 0 PER 100 WBC
P-R INTERVAL, ECG05: 210 MS
P-R INTERVAL, ECG05: 360 MS
PLATELET # BLD AUTO: 209 K/UL (ref 150–400)
PMV BLD AUTO: 11.7 FL (ref 8.9–12.9)
POTASSIUM SERPL-SCNC: 3.1 MMOL/L (ref 3.5–5.1)
Q-T INTERVAL, ECG07: 368 MS
Q-T INTERVAL, ECG07: 368 MS
Q-T INTERVAL, ECG07: 386 MS
QRS DURATION, ECG06: 80 MS
QRS DURATION, ECG06: 82 MS
QRS DURATION, ECG06: 82 MS
QTC CALCULATION (BEZET), ECG08: 450 MS
QTC CALCULATION (BEZET), ECG08: 455 MS
QTC CALCULATION (BEZET), ECG08: 474 MS
RBC # BLD AUTO: 4.45 M/UL (ref 3.8–5.2)
SERVICE CMNT-IMP: NORMAL
SODIUM SERPL-SCNC: 139 MMOL/L (ref 136–145)
TRIGL SERPL-MCNC: 58 MG/DL (ref ?–150)
TROPONIN-HIGH SENSITIVITY: 8 NG/L (ref 0–51)
VENTRICULAR RATE, ECG03: 90 BPM
VENTRICULAR RATE, ECG03: 91 BPM
VENTRICULAR RATE, ECG03: 92 BPM
VLDLC SERPL CALC-MCNC: 11.6 MG/DL
WBC # BLD AUTO: 5.6 K/UL (ref 3.6–11)

## 2022-03-27 PROCEDURE — 74011250637 HC RX REV CODE- 250/637: Performed by: FAMILY MEDICINE

## 2022-03-27 PROCEDURE — 85027 COMPLETE CBC AUTOMATED: CPT

## 2022-03-27 PROCEDURE — 93005 ELECTROCARDIOGRAM TRACING: CPT

## 2022-03-27 PROCEDURE — 83036 HEMOGLOBIN GLYCOSYLATED A1C: CPT

## 2022-03-27 PROCEDURE — 99223 1ST HOSP IP/OBS HIGH 75: CPT | Performed by: SPECIALIST

## 2022-03-27 PROCEDURE — 97161 PT EVAL LOW COMPLEX 20 MIN: CPT

## 2022-03-27 PROCEDURE — G0378 HOSPITAL OBSERVATION PER HR: HCPCS

## 2022-03-27 PROCEDURE — 93970 EXTREMITY STUDY: CPT

## 2022-03-27 PROCEDURE — 80048 BASIC METABOLIC PNL TOTAL CA: CPT

## 2022-03-27 PROCEDURE — 97530 THERAPEUTIC ACTIVITIES: CPT

## 2022-03-27 PROCEDURE — 97116 GAIT TRAINING THERAPY: CPT

## 2022-03-27 PROCEDURE — 80061 LIPID PANEL: CPT

## 2022-03-27 PROCEDURE — 74011250637 HC RX REV CODE- 250/637: Performed by: STUDENT IN AN ORGANIZED HEALTH CARE EDUCATION/TRAINING PROGRAM

## 2022-03-27 PROCEDURE — 96372 THER/PROPH/DIAG INJ SC/IM: CPT

## 2022-03-27 PROCEDURE — 97535 SELF CARE MNGMENT TRAINING: CPT

## 2022-03-27 PROCEDURE — 97165 OT EVAL LOW COMPLEX 30 MIN: CPT

## 2022-03-27 PROCEDURE — 74011000250 HC RX REV CODE- 250: Performed by: FAMILY MEDICINE

## 2022-03-27 PROCEDURE — 36415 COLL VENOUS BLD VENIPUNCTURE: CPT

## 2022-03-27 PROCEDURE — 74011250636 HC RX REV CODE- 250/636: Performed by: FAMILY MEDICINE

## 2022-03-27 PROCEDURE — 84484 ASSAY OF TROPONIN QUANT: CPT

## 2022-03-27 PROCEDURE — 83735 ASSAY OF MAGNESIUM: CPT

## 2022-03-27 RX ORDER — SODIUM CHLORIDE 0.9 % (FLUSH) 0.9 %
5-40 SYRINGE (ML) INJECTION AS NEEDED
Status: DISCONTINUED | OUTPATIENT
Start: 2022-03-27 | End: 2022-03-29 | Stop reason: HOSPADM

## 2022-03-27 RX ORDER — PRAZOSIN HYDROCHLORIDE 1 MG/1
1 CAPSULE ORAL
Status: DISCONTINUED | OUTPATIENT
Start: 2022-03-27 | End: 2022-03-29 | Stop reason: HOSPADM

## 2022-03-27 RX ORDER — ACETAMINOPHEN 650 MG/1
650 SUPPOSITORY RECTAL
Status: DISCONTINUED | OUTPATIENT
Start: 2022-03-27 | End: 2022-03-29 | Stop reason: HOSPADM

## 2022-03-27 RX ORDER — SODIUM CHLORIDE 0.9 % (FLUSH) 0.9 %
5-40 SYRINGE (ML) INJECTION EVERY 8 HOURS
Status: DISCONTINUED | OUTPATIENT
Start: 2022-03-27 | End: 2022-03-29 | Stop reason: HOSPADM

## 2022-03-27 RX ORDER — ONDANSETRON 2 MG/ML
4 INJECTION INTRAMUSCULAR; INTRAVENOUS
Status: DISCONTINUED | OUTPATIENT
Start: 2022-03-27 | End: 2022-03-29 | Stop reason: HOSPADM

## 2022-03-27 RX ORDER — AMLODIPINE BESYLATE 5 MG/1
10 TABLET ORAL DAILY
Status: DISCONTINUED | OUTPATIENT
Start: 2022-03-28 | End: 2022-03-27

## 2022-03-27 RX ORDER — ACETAMINOPHEN 325 MG/1
650 TABLET ORAL
Status: DISCONTINUED | OUTPATIENT
Start: 2022-03-27 | End: 2022-03-29 | Stop reason: HOSPADM

## 2022-03-27 RX ORDER — POTASSIUM CHLORIDE 750 MG/1
40 TABLET, FILM COATED, EXTENDED RELEASE ORAL
Status: COMPLETED | OUTPATIENT
Start: 2022-03-27 | End: 2022-03-27

## 2022-03-27 RX ORDER — METOPROLOL SUCCINATE 50 MG/1
50 TABLET, EXTENDED RELEASE ORAL 2 TIMES DAILY
Status: DISCONTINUED | OUTPATIENT
Start: 2022-03-27 | End: 2022-03-29 | Stop reason: HOSPADM

## 2022-03-27 RX ORDER — ONDANSETRON 4 MG/1
4 TABLET, ORALLY DISINTEGRATING ORAL
Status: DISCONTINUED | OUTPATIENT
Start: 2022-03-27 | End: 2022-03-29 | Stop reason: HOSPADM

## 2022-03-27 RX ORDER — ENOXAPARIN SODIUM 100 MG/ML
40 INJECTION SUBCUTANEOUS DAILY
Status: DISCONTINUED | OUTPATIENT
Start: 2022-03-27 | End: 2022-03-29 | Stop reason: HOSPADM

## 2022-03-27 RX ORDER — AMLODIPINE BESYLATE 5 MG/1
10 TABLET ORAL DAILY
Status: DISCONTINUED | OUTPATIENT
Start: 2022-03-27 | End: 2022-03-29 | Stop reason: HOSPADM

## 2022-03-27 RX ADMIN — AZELASTINE HYDROCHLORIDE 1 SPRAY: 137 SPRAY, METERED NASAL at 17:31

## 2022-03-27 RX ADMIN — SODIUM CHLORIDE, PRESERVATIVE FREE 10 ML: 5 INJECTION INTRAVENOUS at 05:44

## 2022-03-27 RX ADMIN — ASPIRIN 81 MG 81 MG: 81 TABLET ORAL at 09:38

## 2022-03-27 RX ADMIN — FAMOTIDINE 40 MG: 20 TABLET, FILM COATED ORAL at 09:38

## 2022-03-27 RX ADMIN — MONTELUKAST 10 MG: 10 TABLET, FILM COATED ORAL at 09:38

## 2022-03-27 RX ADMIN — Medication 2000 UNITS: at 09:38

## 2022-03-27 RX ADMIN — AMLODIPINE BESYLATE 10 MG: 5 TABLET ORAL at 12:20

## 2022-03-27 RX ADMIN — POTASSIUM CHLORIDE 40 MEQ: 750 TABLET, EXTENDED RELEASE ORAL at 15:05

## 2022-03-27 RX ADMIN — CETIRIZINE HYDROCHLORIDE 10 MG: 10 TABLET, FILM COATED ORAL at 09:39

## 2022-03-27 RX ADMIN — METOPROLOL SUCCINATE 50 MG: 50 TABLET, EXTENDED RELEASE ORAL at 12:21

## 2022-03-27 RX ADMIN — SODIUM CHLORIDE, PRESERVATIVE FREE 10 ML: 5 INJECTION INTRAVENOUS at 21:46

## 2022-03-27 RX ADMIN — CHLORTHALIDONE 25 MG: 25 TABLET ORAL at 09:39

## 2022-03-27 RX ADMIN — LOSARTAN POTASSIUM 100 MG: 50 TABLET, FILM COATED ORAL at 09:39

## 2022-03-27 RX ADMIN — PRAZOSIN HYDROCHLORIDE 1 MG: 1 CAPSULE ORAL at 21:46

## 2022-03-27 RX ADMIN — METOPROLOL SUCCINATE 50 MG: 50 TABLET, EXTENDED RELEASE ORAL at 09:39

## 2022-03-27 RX ADMIN — SODIUM CHLORIDE, PRESERVATIVE FREE 10 ML: 5 INJECTION INTRAVENOUS at 13:51

## 2022-03-27 RX ADMIN — ENOXAPARIN SODIUM 40 MG: 100 INJECTION SUBCUTANEOUS at 09:42

## 2022-03-27 NOTE — ROUTINE PROCESS
Pt had two 3 sec. Pauses with a HR as low as 29 at 2:13 pm.  Dr. Galileo Greene was at RN station and looked at strips and then went to see patient. I messaged the provider at 2:20 pm to notify her and ask if she wanted me to place a cardiology consult.

## 2022-03-27 NOTE — PROGRESS NOTES
Problem: Self Care Deficits Care Plan (Adult)  Goal: *Acute Goals and Plan of Care (Insert Text)  Description: Occupational Therapy Goals  Initiated: 3/27/2022   1. Patient will perform grooming with mod I standing at the sink within 7 day(s). 2.  Patient will perform bathing with setup/supervision from chair within 7 day(s). 3.  Patient will perform upper body dressing and lower body dressing with mod I within 7 day(s). 4.  Patient will perform toilet transfers with mod I within 7 day(s). 5.  Patient will perform all aspects of toileting with mod I within 7 day(s). FUNCTIONAL STATUS PRIOR TO ADMISSION: Pt lives with her daughter. She is independent with self care at baseline. She does not drive but does assist with some light IADL activities. HOME SUPPORT: Lives with daughter. Outcome: Progressing Towards Goal     OCCUPATIONAL THERAPY EVALUATION  Patient: Florene Hodgkin (79 y.o. female)  Date: 3/27/2022  Primary Diagnosis: Accelerated hypertension [I10]        Precautions:   Fall    ASSESSMENT  Based on the objective data described below, the patient presents with decreased independence with self care and functional mobility following admission for HTN urgency. Pt reports her physicians are changing her medications due to a reaction to one of her medications. BP relatively stable with activities this morning but HR ranging from low 50's to 110's. Pt c/o light headed feeling with functional mobility and needed hand held support for balance. Overall, she did well with basic ADL activities but is generally unsteady on her feet. She reports at home, she takes her medication and lays back down for a short while to get up later. Anticipate she will be able to return home with support from her daughter as needed. Current Level of Function Impacting Discharge (ADLs/self-care): supervision to min A for LB ADL activities    Functional Outcome Measure:   The patient scored 55 on the Barthel Index outcome measure which is indicative of 45% impairment with ADL activities. Other factors to consider for discharge: debility, unsteady gait, labile HR     Patient will benefit from skilled therapy intervention to address the above noted impairments. PLAN :  Recommendations and Planned Interventions: self care training, functional mobility training, therapeutic exercise, balance training, therapeutic activities, endurance activities, patient education, home safety training, and family training/education    Frequency/Duration: Patient will be followed by occupational therapy 5 times a week to address goals. Recommendation for discharge: (in order for the patient to meet his/her long term goals)  No skilled occupational therapy/ follow up rehabilitation needs identified at this time. This discharge recommendation:  Has been made in collaboration with the attending provider and/or case management    IF patient discharges home will need the following DME: none       SUBJECTIVE:   Patient stated I am feeling alright.     OBJECTIVE DATA SUMMARY:   HISTORY:   Past Medical History:   Diagnosis Date    Anxiety     Arthritis     GERD (gastroesophageal reflux disease)     Hypertension     Psychiatric disorder     anxiety d/o     Past Surgical History:   Procedure Laterality Date    HX GYN      hysterectomy    HX GYN      hysterectomy 1980    HX ORTHOPAEDIC      total left knee    HX ORTHOPAEDIC      knee replacment 2007 left, 2014 right       Expanded or extensive additional review of patient history:     Home Situation  Home Environment: Private residence  # Steps to Enter: 2  Rails to Enter: No  Wheelchair Ramp: No  One/Two Story Residence: Two story  # of Interior Steps: 12  Interior Rails: Right  Lift Chair Available: No  Living Alone: No  Support Systems:  (adult daughter lives with patient)  Patient Expects to be Discharged to[de-identified]  (TBd/ CM will need to follow)  Current DME Used/Available at Home: Shower chair  Tub or Shower Type: Shower    Hand dominance: Right    EXAMINATION OF PERFORMANCE DEFICITS:  Cognitive/Behavioral Status:  Neurologic State: Alert  Orientation Level: Oriented X4  Cognition: Appropriate for age attention/concentration  Perception: Appears intact  Perseveration: No perseveration noted  Safety/Judgement: Good awareness of safety precautions    Skin: see nursing notes    Edema: none noted    Hearing: Auditory  Auditory Impairment: None    Vision/Perceptual:                           Acuity: Within Defined Limits         Range of Motion:    AROM: Within functional limits  PROM: Within functional limits                      Strength:    Strength: Within functional limits                Coordination:  Coordination: Within functional limits  Fine Motor Skills-Upper: Right Intact; Left Intact    Gross Motor Skills-Upper: Right Intact; Left Intact    Tone & Sensation:    Tone: Normal  Sensation: Intact                      Balance:  Sitting: Intact  Standing: Impaired; With support  Standing - Static: Good  Standing - Dynamic : Fair;Constant support (hand held support)    Functional Mobility and Transfers for ADLs:  Bed Mobility:  Rolling: Modified independent  Supine to Sit: Supervision  Sit to Supine: Supervision  Scooting: Modified independent    Transfers:  Sit to Stand: Stand-by assistance  Stand to Sit: Stand-by assistance  Bed to Chair: Stand-by assistance  Bathroom Mobility: Stand-by assistance  Toilet Transfer : Stand-by assistance    ADL Assessment:  Feeding: Independent    Oral Facial Hygiene/Grooming: Setup    Bathing: Additional time;Minimum assistance    Upper Body Dressing: Supervision    Lower Body Dressing: Additional time;Minimum assistance    Toileting: Stand by assistance                ADL Intervention and task modifications:     She was able to progress to sitting on the side of the bed for LB dressing activities. She needed min A to help adjust her socks on her feet.   She mobilized with hand held support and reports she is able to walk to the bathroom with nursing. She denied light headed feeling with nursing earlier but reports she received her medications after nursing assisted her. With OT/PT, she reported feeling light headed and HR stable with activity and mild drop in BP. After ambulation, HR in the low 50's after sitting EOB for a few minutes following ambulation. Cognitive Retraining  Safety/Judgement: Good awareness of safety precautions    Functional Measure:    Barthel Index:  Bathin  Bladder: 10  Bowels: 10  Groomin  Dressin  Feeding: 10  Mobility: 0  Stairs: 0  Toilet Use: 5  Transfer (Bed to Chair and Back): 10  Total: 55/100      The Barthel ADL Index: Guidelines  1. The index should be used as a record of what a patient does, not as a record of what a patient could do. 2. The main aim is to establish degree of independence from any help, physical or verbal, however minor and for whatever reason. 3. The need for supervision renders the patient not independent. 4. A patient's performance should be established using the best available evidence. Asking the patient, friends/relatives and nurses are the usual sources, but direct observation and common sense are also important. However direct testing is not needed. 5. Usually the patient's performance over the preceding 24-48 hours is important, but occasionally longer periods will be relevant. 6. Middle categories imply that the patient supplies over 50 per cent of the effort. 7. Use of aids to be independent is allowed. Score Interpretation (from [address] [facility])    Independent   60-79 Minimally independent   40-59 Partially dependent   20-39 Very dependent   <20 Totally dependent     -Maria Elena Khalil., Barthel, D.W. (1965). Functional evaluation: the Barthel Index. [facility] ([address] [place] [address] (1997).  The Barthel activities of daily living index: self-reporting versus actual performance in the old (> or = 75 years). Journal of [address] [place] [address] 45(3), [facility], SCOTT, Eze Lester.Caitlin. (1999). Measuring the change in disability after inpatient rehabilitation; comparison of the responsiveness of the Barthel Index and Functional [place] Measure. Journal of Neurology, Neurosurgery, and Psychiatry, 66(4), 846-214. MISTY Cowan, MARIEL Gonzales, & Moses Castellanos M.A. (2004) Assessment of post-stroke quality of life in cost-effectiveness studies: The usefulness of the Barthel Index and the EuroQoL-5D. Quality of Life Research, 15, 991-18         Occupational Therapy Evaluation Charge Determination   History Examination Decision-Making   LOW Complexity : Brief history review  MEDIUM Complexity : 3-5 performance deficits relating to physical, cognitive , or psychosocial skils that result in activity limitations and / or participation restrictions MEDIUM Complexity : Patient may present with comorbidities that affect occupational performnce.  Miniml to moderate modification of tasks or assistance (eg, physical or verbal ) with assesment(s) is necessary to enable patient to complete evaluation       Based on the above components, the patient evaluation is determined to be of the following complexity level: LOW   Pain Rating:  No pain when asked    Activity Tolerance:   Fair  Vitals:    1101 03/27/22 1105 03/27/22 1109 03/27/22 1200 03/27/22 1220   BP: 165 89 (!) 148/97 (!) 162/90  (!) 166/87   BP 1 Location: Left upper arm Left upper arm Left upper arm     BP Patient Position: supine Standing Walking     Pulse: 96 (!) 112 65 (!) 56 93   Temp:        Resp:        Height:        Weight:        SpO2:   94%         After treatment patient left in no apparent distress:    Supine in bed and Call bell within reach    COMMUNICATION/EDUCATION:   The patients plan of care was discussed with: Physical therapist and Registered nurse. Home safety education was provided and the patient/caregiver indicated understanding. and Patient/family have participated as able in goal setting and plan of care. This patients plan of care is appropriate for delegation to [facility].     Thank you for this referral.  Babs Clark OT  Time Calculation: 24 mins

## 2022-03-27 NOTE — PROGRESS NOTES
[address] [facility] Adult  Hospitalist Group                                                                                          Hospitalist Progress Note  Tammie Bond MD  Answering service: [phone] or [phone] from in house phone        Date of Service:  3/27/2022  NAME:  Atul Smith  :  1940  MRN:  [id number]      Admission Summary:   HPI: Robbert Goldberg is a 79 y.o. female with a pmhx past CVA, GERD, HTN, arthritis, and environmental allergies who presents with elevated BP, lightheadedness, and is being admitted for hypertensive urgency. She has been seeing her PCP Dr. Aniceto Patrick for blood pressure management. She was taking losartan//12 .5 mg daily, bisoprolol 5 mg daily, amlodipine 10 mg daily, and hydralazine 50 mg 3 times daily. She states that when she started taking the hydralazine, she started to develop a burning sensation in her throat, and ear so she decreased the frequency from 3 times a day to once daily. She returned to her PCP on 2022, and at that time hydralazine was switched to metoprolol succinate 50 mg daily, and losartan 100 mg over hydrochlorothiazide 12.5 mg was changed to valsartan 320/12.5mg daily. She subsequently began to develop lightheadedness. Her daughter checked her BP and found it to be elevated so brought her to the ED for further evaluation. She denies nausea, vomiting, vision, change, chest pain, shortness of breath or other focal deficit.     In the ED, BP was elevated to 200/105. Other vitals were stable. Creatinine was mildly elevated to 1.1 (b/l 0.7-0.9). UA significant for trace protein, small blood, moderate leukocyte esterase, 1+ bacteria, 10-20 WBCs, and many epithelial cells. CXR c/w pulmonary vascular congestion. CT head with chronic left cerebellar infarct, and chronic small vessel ischemic changes. EKG with inferolateral ST depression. Troponin is negative.     In the ED, she received 10 mg IV labetalol times two doses. \"    Interval history / Subjective:   Patient seen examined at bedside earlier. No acute complaints feels well. Blood pressure improved. Assessment & Plan:     #Hypertensive urgency  #abnormal EKG  Patient presented due to elevated blood pressures, lightheadedness. Initial blood pressure 200/105 with inferior lateral EKG changes. Endorses compliance with home medications. Status post labetalol 10 mg IV x2 doses in the ED with minimal improvement in blood pressure. On March 8, 2022, her hydralazine was stopped due to intolerance, her bisoprolol was changed to metoprolol 50 daily, and her losartan 100 mg, and hydrochlorothiazide 12.5 mg was changed to valsartan//12 .5 mg daily  continue Norvasc 10 mg daily,   increase metoprolol to 50 twice daily. c/w [id number], and chlorthalidone 25mg.  -echo for abnormal EKG pending      #Previous stroke  CT head with evidence of chronic left cerebellar infarct  -pt. C/o difficulties with balance probably combination of stroke and b/l knee replacement  -continue ASA 81mg  -check lipid panel, and HgbA1c  -echo as per above  -PT/OT      #B/L LE edema R>L  -b/l venous duplex     #GERD  Continue home Pepcid     #Environmental allergies  Continue home Singulair, Zyrtec, and azelastine     #Nightmares  -continue prazosin Qhs    Code status: Full   DVT prophylaxis: lovenox subq    Care Plan discussed with: Patient/Family and Nurse  Anticipated Disposition: Home w/Family  Anticipated Discharge: 24 hours to 48 hours     Hospital Problems  Date Reviewed: 3/8/2022          Codes Class Noted POA    Accelerated hypertension ICD-10-CM: I10  ICD-9-CM: 401.0  3/26/2022 Unknown                Review of Systems:   A comprehensive review of systems was negative except for that written in the HPI. Vital Signs:    Last 24hrs VS reviewed since prior progress note.  Most recent are:  Visit Vitals  BP (!) 162/90 (BP 1 Location: Left upper arm, BP Patient Position: Walking)   Pulse 65   Temp 97.9 °F (36.6 °C)   Resp 17   Ht 5' 10\" (1.778 m)   Wt 94.8 kg (208 lb 15.9 oz)   SpO2 94%   BMI 29.99 kg/m²       No intake or output data in the 24 hours ending 03/27/22 1135     Physical Examination:     I had a face to face encounter with this patient and independently examined them on 3/27/2022 as outlined below:          Constitutional:  No acute distress, cooperative, pleasant, obese   ENT:  Oral mucosa moist, oropharynx benign. Resp:  CTA bilaterally. No wheezing/rhonchi/rales. No accessory muscle use   CV:  Regular rhythm, normal rate, no murmurs, gallops, rubs    GI:  Soft, non distended, non tender. normoactive bowel sounds, no hepatosplenomegaly     Musculoskeletal:  No edema, warm, 2+ pulses throughout    Neurologic:  Moves all extremities. AAOx3, CN II-XII reviewed            Data Review:    Review and/or order of clinical lab test  Review and/or order of tests in the radiology section of CPT  Review and/or order of tests in the medicine section of CPT      Labs:     Recent Labs     03/27/22 0052 03/26/22  1135   WBC 5.6 5.8   HGB 13.5 14.6   HCT 41.8 44.6    214     Recent Labs     03/27/22 0052 03/26/22  1135    143   K 3.1* 3.7    100   CO2 30 31   BUN 11 14   CREA 0.95 1.11*   * 106*   CA 8.9 9.5   MG 1.9  --      Recent Labs     03/26/22  1135   ALT 32   AP 86   TBILI 0.8   TP 7.8   ALB 3.9   GLOB 3.9     No results for input(s): INR, PTP, APTT, INREXT in the last 72 hours. No results for input(s): FE, TIBC, PSAT, FERR in the last 72 hours. No results found for: FOL, RBCF   No results for input(s): PH, PCO2, PO2 in the last 72 hours. No results for input(s): CPK, CKNDX, TROIQ in the last 72 hours.     No lab exists for component: CPKMB  Lab Results   Component Value Date/Time    Cholesterol, total 126 03/27/2022 12:52 AM    HDL Cholesterol 61 03/27/2022 12:52 AM    LDL, calculated 53.4 03/27/2022 12:52 AM    Triglyceride 58 03/27/2022 12:52 AM    CHOL/HDL Ratio 2.1 03/27/2022 12:52 AM     Lab Results   Component Value Date/Time    Glucose,  (H) 06/17/2013 10:57 AM     Lab Results   Component Value Date/Time    Color YELLOW/STRAW 03/26/2022 12:26 PM    Appearance HAZY (A) 03/26/2022 12:26 PM    Specific gravity 1.015 03/26/2022 12:26 PM    pH (UA) 7.0 03/26/2022 12:26 PM    Protein TRACE (A) 03/26/2022 12:26 PM    Glucose Negative 03/26/2022 12:26 PM    Ketone Negative 03/26/2022 12:26 PM    Bilirubin Negative 03/26/2022 12:26 PM    Urobilinogen 1.0 03/26/2022 12:26 PM    Nitrites Negative 03/26/2022 12:26 PM    Leukocyte Esterase MODERATE (A) 03/26/2022 12:26 PM    Epithelial cells MANY (A) 03/26/2022 12:26 PM    Bacteria 1+ (A) 03/26/2022 12:26 PM    WBC 10-20 03/26/2022 12:26 PM    RBC 5-10 03/26/2022 12:26 PM         Medications Reviewed:     Current Facility-Administered Medications   Medication Dose Route Frequency    prazosin (MINIPRESS) capsule 1 mg  1 mg Oral QHS    sodium chloride (NS) flush 5-40 mL  5-40 mL IntraVENous Q8H    sodium chloride (NS) flush 5-40 mL  5-40 mL IntraVENous PRN    acetaminophen (TYLENOL) tablet 650 mg  650 mg Oral Q6H PRN    Or    acetaminophen (TYLENOL) suppository 650 mg  650 mg Rectal Q6H PRN    ondansetron (ZOFRAN ODT) tablet 4 mg  4 mg Oral Q8H PRN    Or    ondansetron (ZOFRAN) injection 4 mg  4 mg IntraVENous Q6H PRN    enoxaparin (LOVENOX) injection 40 mg  40 mg SubCUTAneous DAILY    metoprolol succinate (TOPROL-XL) XL tablet 50 mg  50 mg Oral BID    amLODIPine (NORVASC) tablet 10 mg  10 mg Oral DAILY    labetaloL (NORMODYNE;TRANDATE) injection 10 mg  10 mg IntraVENous Q6H PRN    aspirin chewable tablet 81 mg  81 mg Oral DAILY    cetirizine (ZYRTEC) tablet 10 mg  10 mg Oral DAILY    losartan (COZAAR) tablet 100 mg  100 mg Oral DAILY    montelukast (SINGULAIR) tablet 10 mg  10 mg Oral DAILY    famotidine (PEPCID) tablet 40 mg  40 mg Oral DAILY    cholecalciferol (VITAMIN D3) (1000 Units /25 mcg) tablet 2,000 Units  2,000 Units Oral DAILY    azelastine (ASTELIN) 137mcg/spray nasal spray  1 Spray Both Nostrils BID    chlorthalidone (HYGROTON) tablet 25 mg  25 mg Oral DAILY     ______________________________________________________________________  EXPECTED LENGTH OF STAY: - - -  ACTUAL LENGTH OF STAY:          0                 Twin Lanier MD

## 2022-03-27 NOTE — PROGRESS NOTES
Problem: Falls - Risk of  Goal: *Absence of Falls  Description: Document Dom Hart Fall Risk and appropriate interventions in the flowsheet.   Outcome: Progressing Towards Goal  Note: Fall Risk Interventions:  Mobility Interventions: Assess mobility with egress test,Patient to call before getting OOB,PT Consult for mobility concerns,PT Consult for assist device competence,Strengthening exercises (ROM-active/passive)    Mentation Interventions: Adequate sleep, hydration, pain control    Medication Interventions: Assess postural VS orthostatic hypotension,Patient to call before getting OOB,Teach patient to arise slowly    Elimination Interventions: Call light in reach,Stay With Me (per policy),Patient to call for help with toileting needs    History of Falls Interventions: Bed/chair exit alarm,Consult care management for discharge planning,Investigate reason for fall,Room close to nurse's station

## 2022-03-27 NOTE — PROGRESS NOTES
Transition of Care Plan   RUR- obs   DISPOSITION: TBD/ CM will need to follow  Jabier Ballard F/U with PCP/Specialist     Transport: family drives    [address]   Therapy evals are pending        Reason for Admission:  hypertension                     RUR Score:          obs           Plan for utilizing home health:     TBD/ CM will need to follow. If patient needs HH, patient would request a list of options as given for Freedom of Choice     PCP: First and Last name:  Manpreet Stanford MD     Name of Practice: [facility]    Are you a current patient: Yes/No:  yes   Approximate date of last visit: March 2022   Can you participate in a virtual visit with your PCP: yes                    Current Advanced Directive/Advance Care Plan: Full Code      Healthcare Decision Maker:   Click here to complete [address] including selection of the Healthcare Decision Maker Relationship (ie \"Primary\")           Medicare Outpatient Observation Notice (MOON)/ [place] Outpatient Observation Notice (Ancel Cords) provided to patient/representative with verbal explanation of the notice. Time allotted for questions regarding the notice. Patient /representative provided a completed copy of the MOON/VOON notice. Copy placed on bedside chart. State form also given and signed/ placed on chart                  Transition of Care Plan: This CM met with patient and explained CM role in support of dc planning. Patient stated that she is very  Independent with ADL care. CM confirmed address/  Daughter will be able to transport patient home at dc. Ten, [phone]/  CM to follow. Patient does not have any DME . Care Management Interventions  PCP Verified by CM:  Yes  Palliative Care Criteria Met (RRAT>21 & CHF Dx)?: No  Mode of Transport at Discharge:  (family will be ble to transport)  Health Maintenance Reviewed: Yes  Occupational Therapy Consult: Yes  Speech Therapy Consult: No  Support Systems:  (adult daughter lives with patient)  Confirm Follow Up Transport: Family  Discharge Location  Patient Expects to be Discharged to[de-identified]  (TBd/ CM will need to follow)  MOJGAN Sanchez  11:41 AM

## 2022-03-27 NOTE — H&P
[address] [facility] [place] [facility] Adult  Hospitalist Group  History and Physical    Date of Service:  3/27/2022  Primary Care Provider: Blondie Dubin, MD  Source of information: The patient    Chief Complaint: Hypertension      History of Presenting Illness: Nuvia Malagon is a 79 y.o. female with a pmhx past CVA, GERD, HTN, arthritis, and environmental allergies who presents with elevated BP, lightheadedness, and is being admitted for hypertensive urgency. She has been seeing her PCP Dr. Wilda Horton for blood pressure management. She was taking losartan//12 .5 mg daily, bisoprolol 5 mg daily, amlodipine 10 mg daily, and hydralazine 50 mg 3 times daily. She states that when she started taking the hydralazine, she started to develop a burning sensation in her throat, and ear so she decreased the frequency from 3 times a day to once daily. She returned to her PCP on March 8, 2022, and at that time hydralazine was switched to metoprolol succinate 50 mg daily, and losartan 100 mg over hydrochlorothiazide 12.5 mg was changed to valsartan 320/12.5mg daily. She subsequently began to develop lightheadedness. Her daughter checked her BP and found it to be elevated so brought her to the ED for further evaluation. She denies nausea, vomiting, vision, change, chest pain, shortness of breath or other focal deficit. In the ED, BP was elevated to 200/105. Other vitals were stable. Creatinine was mildly elevated to 1.1 (b/l 0.7-0.9). UA significant for trace protein, small blood, moderate leukocyte esterase, 1+ bacteria, 10-20 WBCs, and many epithelial cells. CXR c/w pulmonary vascular congestion. CT head with chronic left cerebellar infarct, and chronic small vessel ischemic changes. EKG with inferolateral ST depression. Troponin is negative. In the ED, she received 10 mg IV labetalol times two doses.           REVIEW OF SYSTEMS:  A comprehensive review of systems was negative except for that written in the History of Present Illness. Past Medical History:   Diagnosis Date    Anxiety     Arthritis     GERD (gastroesophageal reflux disease)     Hypertension     Psychiatric disorder     anxiety d/o      Past Surgical History:   Procedure Laterality Date    HX GYN      hysterectomy    HX GYN      hysterectomy 1980    HX ORTHOPAEDIC      total left knee    HX ORTHOPAEDIC      knee replacment 2007 left, 2014 right     Prior to Admission medications    Medication Sig Start Date End Date Taking? Authorizing Provider   prazosin (MINIPRESS) 1 mg capsule Take 1 Capsule by mouth nightly for 90 days. 3/8/22 6/6/22  Basil Prescott MD   cholecalciferol (VITAMIN D3) (2,000 UNITS /50 MCG) cap capsule Take 1 Capsule by mouth daily for 90 days. 3/8/22 6/6/22  Basil Prescott MD   metoprolol succinate (TOPROL-XL) 50 mg XL tablet TAKE 1 TABLET BY MOUTH DAILY 3/8/22   Basil Prescott MD   valsartan-hydroCHLOROthiazide (DIOVAN-HCT) 320-12.5 mg per tablet TAKE 1 TABLET BY MOUTH DAILY 3/8/22   Basil Prescott MD   famotidine (PEPCID) 40 mg tablet TAKE 1 TABLET BY MOUTH DAILY 3/3/22   Basil Prescott MD   montelukast (SINGULAIR) 10 mg tablet TAKE 1 TABLET BY MOUTH EVERY DAY 2/6/22   Basil Prescott MD   cetirizine (ZYRTEC) 10 mg tablet Take 1 Tablet by mouth daily. Provider, Historical   azelastine (ASTELIN) 137 mcg (0.1 %) nasal spray USE 1 SPRAY IN EACH NOSTRIL TWICE DAILY AS DIRECTED 5/20/21   Shabbir Whittington NP   ALPRAZolam Bharat Files) 0.5 mg tablet TAKE 1 TABLET BY MOUTH EVERY NIGHT FOR UP TO 10 DAYS AS NEEDED FOR ANXIETY. MAX DAILY AMOUNT: 0.5 MG 10/2/20   Basil Prescott MD   carbamide peroxide (DEBROX) 6.5 % otic solution Administer 5 Drops into each ear two (2) times a day. 10/18/19   Rosa Vallecillo NP   aspirin 81 mg chewable tablet Take 81 mg by mouth daily.     Provider, Historical     Allergies   Allergen Reactions    Hydralazine Unknown (comments)    Other Plant, Animal, Environmental Runny Nose      Family History   Problem Relation Age of Onset    Stroke Maternal Grandmother     Hypertension Mother     Stroke Mother     Hypertension Brother     Stroke Brother       Social History:  reports that she quit smoking about 32 years ago. She quit after 8.00 years of use. She has never used smokeless tobacco. She reports that she does not drink alcohol and does not use drugs. Family and social history were personally reviewed, all pertinent and relevant details are outlined as above. Objective:     Visit Vitals  BP (!) 167/82 (BP 1 Location: Left upper arm, BP Patient Position: At rest)   Pulse 94   Temp 97.5 °F (36.4 °C)   Resp 18   Ht 5' 10\" (1.778 m)   Wt 94.8 kg (208 lb 15.9 oz)   SpO2 96%   BMI 29.99 kg/m²      O2 Device: None (Room air)    PHYSICAL EXAM:   General: Alert x oriented x 3, awake, no acute distress, resting in bed, pleasant female, appears to be stated age  HEENT: PEERL, EOMI, moist mucus membranes  Neck: Supple, no JVD, no meningeal signs  Chest: scant diffuse expiratory wheezing  CVS: RRR, S1 S2 heard, no murmurs/rubs/gallops. BP's 160/80's now. Abd: Soft, non-tender, non-distended, +bowel sounds   Ext: No clubbing, no cyanosis, b/l LE edema R>L  Neuro/Psych: Pleasant mood and affect, CN 2-12 grossly intact, sensory grossly within normal limit, Strength 5/5 in all extremities  Cap refill: Brisk, less than 3 seconds  Pulses: 2+ radial pulses  Skin: Warm, dry, without rashes or lesions    Data Review: All diagnostic labs and studies have been reviewed.     Abnormal Labs Reviewed   URINALYSIS W/ REFLEX CULTURE - Abnormal; Notable for the following components:       Result Value    Appearance HAZY (*)     Protein TRACE (*)     Blood SMALL (*)     Leukocyte Esterase MODERATE (*)     Epithelial cells MANY (*)     Bacteria 1+ (*)     All other components within normal limits   METABOLIC PANEL, COMPREHENSIVE - Abnormal; Notable for the following components:    Glucose 106 (*)     Creatinine 1.11 (*)     GFR est AA 57 (*) GFR est non-AA 47 (*)     A-G Ratio 1.0 (*)     All other components within normal limits       All Micro Results     Procedure Component Value Units Date/Time    CULTURE, URINE [[id number]] Collected: 03/26/22 1226    Order Status: Completed Updated: 03/26/22 1534          IMAGING:   CT HEAD WO CONT   Final Result   1. No evidence of acute infarct or intracranial hemorrhage. 2. Mild periventricular white matter disease is likely secondary to chronic   small vessel ischemic changes. 3. Chronic left cerebellar infarct. XR CHEST PORT   Final Result   Cardiomegaly. Pulmonary vascular congestion. DUPLEX LOWER EXT VENOUS BILAT    (Results Pending)        ECG/ECHO:    Results for orders placed or performed during the hospital encounter of 03/26/22   EKG, 12 LEAD, INITIAL   Result Value Ref Range    Ventricular Rate 90 BPM    Atrial Rate 90 BPM    P-R Interval 210 ms    QRS Duration 82 ms    Q-T Interval 368 ms    QTC Calculation (Bezet) 450 ms    Calculated P Axis 107 degrees    Calculated R Axis 32 degrees    Calculated T Axis -77 degrees    Diagnosis       Sinus rhythm with 1st degree AV block  Septal infarct , age undetermined  ST & T wave abnormality, consider inferolateral ischemia  Abnormal ECG  When compared with ECG of 19-FEB-2019 12:30,  ND interval has increased  ST more depressed in Inferior leads  ST now depressed in Lateral leads  QT has lengthened          Assessment:   Given the patient's current clinical presentation, there is a high level of concern for decompensation if discharged from the emergency department. Complex decision making was performed, which includes reviewing the patient's available past medical records, laboratory results, and imaging studies. Active Problems:    Accelerated hypertension (3/26/2022)      Plan:     #Hypertensive urgency  #abnormal EKG  Patient presented due to elevated blood pressures, lightheadedness.   Initial blood pressure 200/105 with inferior lateral EKG changes. Endorses compliance with home medications. Status post labetalol 10 mg IV x2 doses in the ED with minimal improvement in blood pressure. On March 8, 2022, her hydralazine was stopped due to intolerance, her bisoprolol was changed to metoprolol 50 daily, and her losartan 100 mg, and hydrochlorothiazide 12.5 mg was changed to valsartan//12 .5 mg daily  We will continue Norvasc 10 mg daily,  And metoprolol 50 mg daily. Changing valsartan/hct 320/12.5 to [id number], and chlorthalidone 25mg.  -will attmept to decreased MAP by 10-20% immediately. -echo for abnormal EKG     #Previous stroke  CT head with evidence of chronic left cerebellar infarct  -pt. C/o difficulties with balance probably combination of stroke and b/l knee replacement  -continue ASA 81mg  -check lipid panel, and HgbA1c  -echo as per above    #B/L LE edema R>L  -b/l venous duplex    #GERD  Continue home Pepcid    #Environmental allergies  Continue home Singulair, Zyrtec, and azelastine    #Nightmares  -continue prazosin Qhs    DIET: ADULT DIET Regular; Gestational (30-60-60 gm/meal); Low Fat/Low Chol/High Fiber/2 gm Na; Low Sodium (2 gm)   ISOLATION PRECAUTIONS: There are currently no Active Isolations  CODE STATUS: Full Code   DVT PROPHYLAXIS: Lovenox  FUNCTIONAL STATUS PRIOR TO HOSPITALIZATION: Fully active and ambulatory; able to carry on all self-care without restriction. EARLY MOBILITY ASSESSMENT: Recommend an assessment from physical therapy and/or occupational therapy  ANTICIPATED DISCHARGE: 24-48 hours. EMERGENCY CONTACT/SURROGATE DECISION MAKER: Talisha Mayorga (daughter)    Signed By: Rubén Sanders MD     March 27, 2022         Please note that this dictation may have been completed with Dragon, the EPIS voice recognition software. Quite often unanticipated grammatical, syntax, homophones, and other interpretive errors are inadvertently transcribed by the computer software.   Please disregard these errors. Please excuse any errors that have escaped final proofreading.

## 2022-03-27 NOTE — PROGRESS NOTES
Problem: Mobility Impaired (Adult and Pediatric)  Goal: *Acute Goals and Plan of Care (Insert Text)  Description: FUNCTIONAL STATUS PRIOR TO ADMISSION: Patient was independent and active without use of DME.    HOME SUPPORT PRIOR TO ADMISSION: The patient lived with daughter in two story home, 12 steps with rail to second floor bed & bath, 2 steps no rail to enter but did not require assist.    Physical Therapy Goals  Initiated 3/27/2022  1. Patient will move from supine to sit and sit to supine , scoot up and down, and roll side to side in bed with modified independence within 7 day(s). 2.  Patient will transfer from bed to chair and chair to bed with modified independence using the least restrictive device within 7 day(s). 3.  Patient will perform sit to stand with modified independence within 7 day(s). 4.  Patient will ambulate with modified independence for 150 feet with the least restrictive device within 7 day(s). 5.  Patient will ascend/descend 12 stairs with one handrail(s) with modified independence within 7 day(s). Outcome: Progressing Towards Goal   PHYSICAL THERAPY EVALUATION  Patient: Bravo Bradford (79 y.o. female)  Date: 3/27/2022  Primary Diagnosis: Accelerated hypertension [I10]        Precautions:   Fall    ASSESSMENT  Based on the objective data described below, the patient presents with  impairment in functional mobility, activity tolerance and balance s/p Acceleration of HTN and adjustment of medications. . Patient is pleasant, alert and oriented x 4. Patient lives with daughter in a two story home with 12 steps and rail to second floor bed & bath. PLOF: Independent with ADLs and IADLs. Patient performed bed mobility, transfers and gait in hallway today. Gait was a bit wobbly and required gait belt and hand hold assistance. Patient states that she is normally not wobbly at all, but feels a little off balance and light headed probably due to labile BP and adjustments in medication. Anticipate discharge home with [address] when medically stable. Current Level of Function Impacting Discharge (mobility/balance): Stand by assistance for bed mobility and transfers, contact guard with gait belt and hand hold, 75 ft. Functional Outcome Measure: The patient scored 55/100 on the Barthel outcome measure which is indicative of moderate impaired ability to care for basic self-needs/dependency on others. .      Other factors to consider for discharge: Motivated/A & O x 4/Supportive Family/Independent PLOF      Patient will benefit from skilled therapy intervention to address the above noted impairments. PLAN :  Recommendations and Planned Interventions: bed mobility training, transfer training, gait training, therapeutic exercises, patient and family training/education, and therapeutic activities      Frequency/Duration: Patient will be followed by physical therapy:  5 times a week to address goals. Recommendation for discharge: (in order for the patient to meet his/her long term goals)  Physical therapy at least 2 days/week in the home     This discharge recommendation:  Has been made in collaboration with the attending provider and/or case management    IF patient discharges home will need the following DME: straight cane vs RW depending on progress at discharge         SUBJECTIVE:   Patient stated *I am usually very active. This has kind of thrown me for a loop. 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, 2014 right       Personal factors and/or comorbidities impacting plan of care:  Motivated/A & O x 4/Supportive Family/Independent PLOF     Home Situation  Home Environment: Private residence  # Steps to Enter: 2  Rails to Enter: No  Wheelchair Ramp: No  One/Two Story Residence: Two story  # of Interior Steps: 12  Interior Rails: Right  Lift Chair Available: No  Living Alone: No  Support Systems:  (adult daughter lives with patient)  Patient Expects to be Discharged to[de-identified]  (TBd/ CM will need to follow)  Current DME Used/Available at Home: Shower chair  Tub or Shower Type: Shower    EXAMINATION/PRESENTATION/DECISION MAKING:   Critical Behavior:  Neurologic State: Alert  Orientation Level: Oriented X4        Hearing: Auditory  Auditory Impairment: None    Range Of Motion:  AROM: Generally decreased, functional           PROM: Generally decreased, functional           Strength:    Strength: Generally decreased, functional                    Tone & Sensation:   Tone: Normal              Sensation: Intact               Coordination:  Coordination: Within functional limits  Vision:      Functional Mobility:  Bed Mobility:  Rolling: Modified independent  Supine to Sit: Stand-by assistance;Setup  Sit to Supine: Stand-by assistance;Setup  Scooting: Modified independent  Transfers:  Sit to Stand: Stand-by assistance  Stand to Sit: Stand-by assistance                       Balance:   Sitting: Intact  Standing: Impaired; Without support  Standing - Static: Good  Standing - Dynamic : Good;Fair;Constant support  Ambulation/Gait Training:  Distance (ft): 75 Feet (ft)  Assistive Device: Gait belt (Muse Hold assistance)  Ambulation - Level of Assistance: Contact guard assistance        Gait Abnormalities: Ataxic;Trunk sway increased        Base of Support: Widened  Stance:  (equal)  Speed/Tonya: Slow  Step Length:  (equal)  Swing Pattern:  (equal)     Interventions: Safety awareness training;Verbal cues            Functional Measure:  Barthel Index:    Bathin  Bladder: 10  Bowels: 10  Groomin  Dressin  Feeding: 10  Mobility: 0  Stairs: 0  Toilet Use: 5  Transfer (Bed to Chair and Back): 10  Total: 55/100       The Barthel ADL Index: Guidelines  1. The index should be used as a record of what a patient does, not as a record of what a patient could do. 2. The main aim is to establish degree of independence from any help, physical or verbal, however minor and for whatever reason. 3. The need for supervision renders the patient not independent. 4. A patient's performance should be established using the best available evidence. Asking the patient, friends/relatives and nurses are the usual sources, but direct observation and common sense are also important. However direct testing is not needed. 5. Usually the patient's performance over the preceding 24-48 hours is important, but occasionally longer periods will be relevant. 6. Middle categories imply that the patient supplies over 50 per cent of the effort. 7. Use of aids to be independent is allowed. Score Interpretation (from [address] [facility] 83)    Independent   60-79 Minimally independent   40-59 Partially dependent   20-39 Very dependent   <20 Totally dependent     -Maria Elena Khalil., Barthel, D.W. (1965). Functional evaluation: the Barthel Index. [address] [facility] ([address] (1997). The Barthel activities of daily living index: self-reporting versus actual performance in the old (> or = 75 years). Journal of [address] [place] [address] 45(7), [facility], J.KELSEAF, Mattie Nielsen., [facility]. (1999). Measuring the change in disability after inpatient rehabilitation; comparison of the responsiveness of the Barthel Index and Functional [place] Measure. Journal of Neurology, Neurosurgery, and Psychiatry, 66(4), 228-347. Ghazala Lugo, N.J.A, MARIEL Gonzales, & Andres Roblero MCharitoA. (2004) Assessment of post-stroke quality of life in cost-effectiveness studies: The usefulness of the Barthel Index and the EuroQoL-5D.  Quality of Life Research, 13, 827-13           Physical Therapy Evaluation Charge Determination   History Examination Presentation Decision-Making   MEDIUM  Complexity : 1-2 comorbidities / personal factors will impact the outcome/ POC  LOW Complexity : 1-2 Standardized tests and measures addressing body structure, function, activity limitation and / or participation in recreation  LOW Complexity : Stable, uncomplicated  LOW Complexity : FOTO score of       Based on the above components, the patient evaluation is determined to be of the following complexity level: LOW     Pain Rating:  None reported or observed. Activity Tolerance:   Good; BP dropped with activity and no tachycardia, bradycardia or SOB noted. Did feel a little \"lightheaded\"  Vitals:    03/27/22 1058 03/27/22 1101 03/27/22 1105 03/27/22 1109   BP: (!) 164/101 (!) 165/89 (!) 148/97 (!) 162/90   BP 1 Location: Left upper arm Left upper arm Left upper arm Left upper arm   BP Patient Position: At rest;Semi fowlers Sitting Standing Walking   Pulse: 93 96 (!) 112 65   Temp:       Resp:       Height:       Weight:       SpO2:    94%        After treatment patient left in no apparent distress:   Supine in bed, Call bell within reach, Side rails x 3, and nurse ntofied. COMMUNICATION/EDUCATION:   The patients plan of care was discussed with: Occupational therapist and Registered nurse. Discussed plan of care and interventions with patient and she agreed to same.     Thank you for this referral.  Main Ruano   Time Calculation: 32 mins

## 2022-03-28 ENCOUNTER — TELEPHONE (OUTPATIENT)
Dept: PRIMARY CARE CLINIC | Age: 82
End: 2022-03-28

## 2022-03-28 LAB
ANION GAP SERPL CALC-SCNC: 4 MMOL/L (ref 5–15)
BUN SERPL-MCNC: 14 MG/DL (ref 6–20)
BUN/CREAT SERPL: 14 (ref 12–20)
CALCIUM SERPL-MCNC: 8.6 MG/DL (ref 8.5–10.1)
CHLORIDE SERPL-SCNC: 106 MMOL/L (ref 97–108)
CO2 SERPL-SCNC: 30 MMOL/L (ref 21–32)
CREAT SERPL-MCNC: 0.99 MG/DL (ref 0.55–1.02)
ERYTHROCYTE [DISTWIDTH] IN BLOOD BY AUTOMATED COUNT: 12.6 % (ref 11.5–14.5)
GLUCOSE SERPL-MCNC: 89 MG/DL (ref 65–100)
HCT VFR BLD AUTO: 39.8 % (ref 35–47)
HGB BLD-MCNC: 12.6 G/DL (ref 11.5–16)
MAGNESIUM SERPL-MCNC: 1.9 MG/DL (ref 1.6–2.4)
MCH RBC QN AUTO: 30.1 PG (ref 26–34)
MCHC RBC AUTO-ENTMCNC: 31.7 G/DL (ref 30–36.5)
MCV RBC AUTO: 95.2 FL (ref 80–99)
NRBC # BLD: 0 K/UL (ref 0–0.01)
NRBC BLD-RTO: 0 PER 100 WBC
PLATELET # BLD AUTO: 184 K/UL (ref 150–400)
PMV BLD AUTO: 11.1 FL (ref 8.9–12.9)
POTASSIUM SERPL-SCNC: 4.1 MMOL/L (ref 3.5–5.1)
RBC # BLD AUTO: 4.18 M/UL (ref 3.8–5.2)
SODIUM SERPL-SCNC: 140 MMOL/L (ref 136–145)
WBC # BLD AUTO: 5.2 K/UL (ref 3.6–11)

## 2022-03-28 PROCEDURE — 74011250637 HC RX REV CODE- 250/637: Performed by: FAMILY MEDICINE

## 2022-03-28 PROCEDURE — 80048 BASIC METABOLIC PNL TOTAL CA: CPT

## 2022-03-28 PROCEDURE — 96372 THER/PROPH/DIAG INJ SC/IM: CPT

## 2022-03-28 PROCEDURE — 74011000250 HC RX REV CODE- 250: Performed by: FAMILY MEDICINE

## 2022-03-28 PROCEDURE — 74011250636 HC RX REV CODE- 250/636: Performed by: FAMILY MEDICINE

## 2022-03-28 PROCEDURE — 99232 SBSQ HOSP IP/OBS MODERATE 35: CPT | Performed by: INTERNAL MEDICINE

## 2022-03-28 PROCEDURE — G0378 HOSPITAL OBSERVATION PER HR: HCPCS

## 2022-03-28 PROCEDURE — 85027 COMPLETE CBC AUTOMATED: CPT

## 2022-03-28 PROCEDURE — 74011250637 HC RX REV CODE- 250/637: Performed by: STUDENT IN AN ORGANIZED HEALTH CARE EDUCATION/TRAINING PROGRAM

## 2022-03-28 PROCEDURE — 65660000000 HC RM CCU STEPDOWN

## 2022-03-28 PROCEDURE — 83735 ASSAY OF MAGNESIUM: CPT

## 2022-03-28 PROCEDURE — 97535 SELF CARE MNGMENT TRAINING: CPT | Performed by: OCCUPATIONAL THERAPIST

## 2022-03-28 PROCEDURE — 36415 COLL VENOUS BLD VENIPUNCTURE: CPT

## 2022-03-28 RX ADMIN — SODIUM CHLORIDE, PRESERVATIVE FREE 10 ML: 5 INJECTION INTRAVENOUS at 05:36

## 2022-03-28 RX ADMIN — Medication 2000 UNITS: at 09:16

## 2022-03-28 RX ADMIN — SODIUM CHLORIDE, PRESERVATIVE FREE 10 ML: 5 INJECTION INTRAVENOUS at 21:53

## 2022-03-28 RX ADMIN — AZELASTINE HYDROCHLORIDE 1 SPRAY: 137 SPRAY, METERED NASAL at 18:41

## 2022-03-28 RX ADMIN — ENOXAPARIN SODIUM 40 MG: 100 INJECTION SUBCUTANEOUS at 09:16

## 2022-03-28 RX ADMIN — AMLODIPINE BESYLATE 10 MG: 5 TABLET ORAL at 09:16

## 2022-03-28 RX ADMIN — CETIRIZINE HYDROCHLORIDE 10 MG: 10 TABLET, FILM COATED ORAL at 09:15

## 2022-03-28 RX ADMIN — MONTELUKAST 10 MG: 10 TABLET, FILM COATED ORAL at 09:16

## 2022-03-28 RX ADMIN — FAMOTIDINE 40 MG: 20 TABLET, FILM COATED ORAL at 09:16

## 2022-03-28 RX ADMIN — ASPIRIN 81 MG 81 MG: 81 TABLET ORAL at 09:15

## 2022-03-28 RX ADMIN — LOSARTAN POTASSIUM 100 MG: 50 TABLET, FILM COATED ORAL at 09:16

## 2022-03-28 RX ADMIN — SODIUM CHLORIDE, PRESERVATIVE FREE 10 ML: 5 INJECTION INTRAVENOUS at 14:00

## 2022-03-28 RX ADMIN — CHLORTHALIDONE 25 MG: 25 TABLET ORAL at 09:16

## 2022-03-28 RX ADMIN — AZELASTINE HYDROCHLORIDE 1 SPRAY: 137 SPRAY, METERED NASAL at 09:16

## 2022-03-28 RX ADMIN — PRAZOSIN HYDROCHLORIDE 1 MG: 1 CAPSULE ORAL at 21:52

## 2022-03-28 NOTE — PROGRESS NOTES
Problem: Self Care Deficits Care Plan (Adult)  Goal: *Acute Goals and Plan of Care (Insert Text)  Description: Occupational Therapy Goals  Initiated: 3/27/2022   1. Patient will perform grooming with mod I standing at the sink within 7 day(s). 2.  Patient will perform bathing with setup/supervision from chair within 7 day(s). 3.  Patient will perform upper body dressing and lower body dressing with mod I within 7 day(s). 4.  Patient will perform toilet transfers with mod I within 7 day(s). 5.  Patient will perform all aspects of toileting with mod I within 7 day(s). FUNCTIONAL STATUS PRIOR TO ADMISSION: Pt lives with her daughter. She is independent with self care at baseline. She does not drive but does assist with some light IADL activities. HOME SUPPORT: Lives with daughter. Outcome: Progressing Towards Goal     OCCUPATIONAL THERAPY TREATMENT  Patient: Santiago Frazier (79 y.o. female)  Date: 3/28/2022  Diagnosis: Accelerated hypertension [I10] <principal problem not specified>       Precautions: Fall  Chart, occupational therapy assessment, plan of care, and goals were reviewed. ASSESSMENT  Patient continues with skilled OT services and is progressing towards goals. Pt is at an overall SBA level with LE ADLs, toileting an functional mobility with no LOB. HR at rest in the 40s and with ADLs and functional mobility 53-64. Educated pt on energy conservation techniques to improve her safety and independent and pt demonstrated good understanding. Do not anticipate any OT needs after discharge. Current Level of Function Impacting Discharge (ADLs): SBA    Other factors to consider for discharge: see above         PLAN :  Patient continues to benefit from skilled intervention to address the above impairments. Continue treatment per established plan of care to address goals.     Recommend with staff: up with SBA for safety    Recommend next OT session: standing endurance, light IADLs    Recommendation for discharge: (in order for the patient to meet his/her long term goals)  No skilled occupational therapy/ follow up rehabilitation needs identified at this time. This discharge recommendation:  Has been made in collaboration with the attending provider and/or case management    IF patient discharges home will need the following DME: TBD       SUBJECTIVE:   Patient stated I feel ok.     OBJECTIVE DATA SUMMARY:   Cognitive/Behavioral Status:  Neurologic State: Alert; Appropriate for age  Orientation Level: Oriented X4  Cognition: Appropriate decision making; Appropriate for age attention/concentration; Appropriate safety awareness; Follows commands  Perception: Appears intact  Perseveration: No perseveration noted  Safety/Judgement: Awareness of environment; Fall prevention;Good awareness of safety precautions; Home safety; Insight into deficits    Functional Mobility and Transfers for ADLs:  Bed Mobility:  Supine to Sit: Modified independent    Transfers:  Sit to Stand: Stand-by assistance  Functional Transfers  Bathroom Mobility: Stand-by assistance  Toilet Transfer : Stand-by assistance  Cues: Verbal cues provided  Bed to Chair: Stand-by assistance    Balance:  Sitting: Intact  Standing: Impaired  Standing - Static: Good  Standing - Dynamic : Good;Fair    ADL Intervention:  Patient instructed and indicated understanding energy conservation techniques to increase independence and safety during ADLs.      Grooming  Grooming Assistance: Stand-by assistance  Position Performed: Standing  Washing Hands: Stand-by assistance  Cues: Verbal cues provided    Upper Body [address]: Set-up (donned 2- one in front and one in back)  Cues: Verbal cues provided    Lower Body Dressing Assistance  Socks: Set-up  Leg Crossed Method Used: Yes  Position Performed: Seated edge of bed  Cues: Don;Doff;Verbal cues provided    Toileting  Toileting Assistance: Stand-by assistance  Bladder Hygiene: Modified independent  Bowel Hygiene: Modified indpendent  Clothing Management: Stand-by assistance  Cues: Verbal cues provided    Cognitive Retraining  Safety/Judgement: Awareness of environment; Fall prevention;Good awareness of safety precautions; Home safety; Insight into deficits    Pain:  No c/o pain    Activity Tolerance:   Good    After treatment patient left in no apparent distress:   Sitting in chair and Call bell within reach    COMMUNICATION/COLLABORATION:   The patients plan of care was discussed with: Registered nurse.      Yvette Villalpando, OT  Time Calculation: 24 mins

## 2022-03-28 NOTE — PROGRESS NOTES
Cardiology Progress Note            Admit Date: 3/26/2022  Admit Diagnosis: Accelerated hypertension [I10]  Date: 3/28/2022     Time: 12:01 PM    HPI:  Mr. Italo Arora is an 79 y.o. male with PMH of HTN, anxiety, GERD. She presented for uncontrolled BP and lightheadedness. She demonstrated pauses of up to 3 seconds (sinus bradycardia) and home metoprolol was stopped. (pt was on toprol XL 50 daily PTA)  Cardiology consulted for bradycardia. Subjective: today, pt has had no further episodes of signficant pauses. She still has sinus bradycardia on telemetry review. She had one episode of burst of sinus tachycardia followed by Sinus bradycardia to 40's. She denies any further dizziness. No chest pain or SOB. She denies any history of syncope. Assessment and Plan     1. Pauses:     -No further significant pauses noted on telemetry. Still with episodes of SB on tele review.   -Echo pending.    -Remain off toprol for now (last dose 3/27 12 noon). Allow 48 hour washout. If symptomatic bradycardia or significant pauses off lopressor will ask Dr. Stevie Tunrer to see. 2. Malignant HTN   -BP controlled currently.    -Continue Losartan(Valsartan-HCTZ PTA),amlodipine. (On prazosin for nightmares)   -on chlorthalidone    3. Dyslipidemia   -at goal.  LDL 53, HDL 61. No statin PTA. Saw and evaluated pt and agree with above assessment and plan. Doing ok off BB. If becomes tachy/juan alberto, may involve EP Dr. Stevie Turner. Luba Mantilla MD      Cardiac testing history:  Echo pending  Stress echo 8/2013: negative for ischemia.   PMH  Past Medical History:   Diagnosis Date    Anxiety     Arthritis     GERD (gastroesophageal reflux disease)     Hypertension     Psychiatric disorder     anxiety d/o      Social Hx  Social History     Socioeconomic History    Marital status:      Spouse name: Not on file    Number of children: Not on file    Years of education: Not on file    Highest education level: Not on file   Occupational History    Not on file   Tobacco Use    Smoking status: Former Smoker     Years: 8.00     Quit date: 1990     Years since quittin.2    Smokeless tobacco: Never Used   Substance and Sexual Activity    Alcohol use: No    Drug use: No    Sexual activity: Not Currently   Other Topics Concern    Not on file   Social History Narrative    ** Merged History Encounter **          Social Determinants of Health     Financial Resource Strain:     Difficulty of Paying Living Expenses: Not on file   Food Insecurity:     Worried About Running Out of Food in the Last Year: Not on file    Ronnell of Food in the Last Year: Not on file   Transportation Needs:     Lack of Transportation (Medical): Not on file    Lack of Transportation (Non-Medical): Not on file   Physical Activity:     Days of Exercise per Week: Not on file    Minutes of Exercise per Session: Not on file   Stress:     Feeling of Stress : Not on file   Social Connections:     Frequency of Communication with Friends and Family: Not on file    Frequency of Social Gatherings with Friends and Family: Not on file    Attends [employer] Services: Not on file    Active Member of [address] [place] [address] or Organizations: Not on file    Attends Club or Organization Meetings: Not on file    Marital Status: Not on file   Intimate Partner Violence:     Fear of Current or Ex-Partner: Not on file    Emotionally Abused: Not on file    Physically Abused: Not on file    Sexually Abused: Not on file   Housing Stability:     Unable to Pay for Housing in the Last Year: Not on file    Number of [place] in the Last Year: Not on file    Unstable Housing in the Last Year: Not on file       ROS:  Pertinent items are noted in HPI.     Objective:      Physical Exam:                Visit Vitals  BP (!) 142/78 (BP 1 Location: Left upper arm, BP Patient Position: At rest)   Pulse 91   Temp 98.2 °F (36.8 °C)   Resp 19   Ht 5' 10\" (1.778 m)   Wt 94.8 kg (208 lb 15.9 oz)   SpO2 97%   BMI 29.99 kg/m²          General Appearance:   Well developed, well nourished,alert and oriented x 3, and   individual in no acute distress. Ears/Nose/Mouth/Throat:    Hearing grossly normal.         Neck:  Supple. Chest:    Lungs clear to auscultation bilaterally. Cardiovascular:   Regular rate and rhythm, S1, S2 normal, no murmur. Abdomen:    Soft, non-tender, bowel sounds are active. Extremities:  No edema bilaterally. Skin:  Warm and dry. Telemetry: NSR-brief burst of sinus tachycardia. Sinus bradycardia, no signficant pauses.            Data Review:    Labs:    Recent Results (from the past 24 hour(s))   EKG, 12 LEAD, INITIAL    Collection Time: 03/27/22  2:57 PM   Result Value Ref Range    Ventricular Rate 91 BPM    Atrial Rate 227 BPM    QRS Duration 82 ms    Q-T Interval 386 ms    QTC Calculation (Bezet) 474 ms    Calculated R Axis 171 degrees    Calculated T Axis -122 degrees    Diagnosis       Atrial fibrillation  Right axis deviation  ST & T wave abnormality, consider inferolateral ischemia or digitalis effect  Prolonged QT  When compared with ECG of 26-MAR-2022 17:40,  No significant change  Confirmed by Inocencio Merida MD. ([id number]) on 3/27/2022 10:10:18 PM     TROPONIN-HIGH SENSITIVITY    Collection Time: 03/27/22  3:13 PM   Result Value Ref Range    Troponin-High Sensitivity 8 0 - 51 ng/L   METABOLIC PANEL, BASIC    Collection Time: 03/28/22 12:10 AM   Result Value Ref Range    Sodium 140 136 - 145 mmol/L    Potassium 4.1 3.5 - 5.1 mmol/L    Chloride 106 97 - 108 mmol/L    CO2 30 21 - 32 mmol/L    Anion gap 4 (L) 5 - 15 mmol/L    Glucose 89 65 - 100 mg/dL    BUN 14 6 - 20 MG/DL    Creatinine 0.99 0.55 - 1.02 MG/DL    BUN/Creatinine ratio 14 12 - 20      GFR est AA >60 >60 ml/min/1.73m2    GFR est non-AA 54 (L) >60 ml/min/1.73m2    Calcium 8.6 8.5 - 10.1 MG/DL   MAGNESIUM    Collection Time: 03/28/22 12:10 AM   Result Value Ref Range    Magnesium 1.9 1.6 - 2.4 mg/dL   CBC W/O DIFF    Collection Time: 03/28/22 12:10 AM   Result Value Ref Range    WBC 5.2 3.6 - 11.0 K/uL    RBC 4.18 3.80 - 5.20 M/uL    HGB 12.6 11.5 - 16.0 g/dL    HCT 39.8 35.0 - 47.0 %    MCV 95.2 80.0 - 99.0 FL    MCH 30.1 26.0 - 34.0 PG    MCHC 31.7 30.0 - 36.5 g/dL    RDW 12.6 11.5 - 14.5 %    PLATELET 982 184 - 639 K/uL    MPV 11.1 8.9 - 12.9 FL    NRBC 0.0 0  WBC    ABSOLUTE NRBC 0.00 0.00 - 0.01 K/uL          Radiology:        Current Facility-Administered Medications   Medication Dose Route Frequency    prazosin (MINIPRESS) capsule 1 mg  1 mg Oral QHS    sodium chloride (NS) flush 5-40 mL  5-40 mL IntraVENous Q8H    sodium chloride (NS) flush 5-40 mL  5-40 mL IntraVENous PRN    acetaminophen (TYLENOL) tablet 650 mg  650 mg Oral Q6H PRN    Or    acetaminophen (TYLENOL) suppository 650 mg  650 mg Rectal Q6H PRN    ondansetron (ZOFRAN ODT) tablet 4 mg  4 mg Oral Q8H PRN    Or    ondansetron (ZOFRAN) injection 4 mg  4 mg IntraVENous Q6H PRN    enoxaparin (LOVENOX) injection 40 mg  40 mg SubCUTAneous DAILY    [Held by provider] metoprolol succinate (TOPROL-XL) XL tablet 50 mg  50 mg Oral BID    amLODIPine (NORVASC) tablet 10 mg  10 mg Oral DAILY    aspirin chewable tablet 81 mg  81 mg Oral DAILY    cetirizine (ZYRTEC) tablet 10 mg  10 mg Oral DAILY    losartan (COZAAR) tablet 100 mg  100 mg Oral DAILY    montelukast (SINGULAIR) tablet 10 mg  10 mg Oral DAILY    famotidine (PEPCID) tablet 40 mg  40 mg Oral DAILY    cholecalciferol (VITAMIN D3) (1000 Units /25 mcg) tablet 2,000 Units  2,000 Units Oral DAILY    azelastine (ASTELIN) 137mcg/spray nasal spray  1 Spray Both Nostrils BID    chlorthalidone (HYGROTON) tablet 25 mg  25 mg Oral DAILY          Melody Meckel.  JOSE MIGUEL Jackson     [facility] [address] [place] [address] Drive, [address] [place] [address],[address] [place] [address] [address]   [phone]

## 2022-03-28 NOTE — CONSULTS
3/27/2022    Cardiology Initial CareNote   [facility] M[facility] , [facility]   --------PCP:-Tu Templeton MD   -----Subjective: Kierra Grover is a 79 y.o. female  Initial Care Note requested for evaluation and management of bradycardia  Patient was admitted with hypertension and hypertensive emergency low with lightheadedness. Some multiple meds followed by her PCP. Here she had EKG showing possible atrial fibrillation junctional rhythm. On the monitor she has had 3-second pauses and we are consulted for bradycardia. She is on a beta-blocker. Denies chest pain, edema, syncope or shortness of breath at rest   Has no tachycardia , palpitations or sense of arrythmia      Prior cardiac history *  **  Hypertension, hyperlipidemia, GERD        Discussion/Plan/Impression:  1. Pauses while in sinus rhythm for up to 3 seconds. Also EKG showing some possible A. fib or junctional rhythm. Will ask EP to see in the meantime stop beta-blocker    2. Malignant hypertension multiple medications reviewed though asymptomatic  At goal , meds and possible side effects reviewed and patient denies  Key CAD CHF Meds             prazosin (MINIPRESS) 1 mg capsule Take 1 Capsule by mouth nightly for 90 days. metoprolol succinate (TOPROL-XL) 50 mg XL tablet TAKE 1 TABLET BY MOUTH DAILY    valsartan-hydroCHLOROthiazide (DIOVAN-HCT) 320-12.5 mg per tablet TAKE 1 TABLET BY MOUTH DAILY    aspirin 81 mg chewable tablet Take 81 mg by mouth daily. BP Readings from Last 6 Encounters:   03/27/22 (!) 135/90   03/08/22 (!) 152/82   06/25/21 138/68   06/19/21 (!) 163/68   09/25/20 138/79   10/18/19 163/81        3. Dyslipidemia  At goal , denies excess muscle aches or new liver issues  Key Antihyperlipidemia Meds     The patient is on no antihyperlipidemia meds.          Lab Results   Component Value Date/Time    LDL, calculated 53.4 03/27/2022 12:52 AM            Lab Results   Component Value Date/Time    Creatinine 0.95 2022 12:52 AM      Results for orders placed or performed during the hospital encounter of 22   EKG, 12 LEAD, INITIAL   Result Value Ref Range    Ventricular Rate 91 BPM    Atrial Rate 227 BPM    QRS Duration 82 ms    Q-T Interval 386 ms    QTC Calculation (Bezet) 474 ms    Calculated R Axis 171 degrees    Calculated T Axis -122 degrees    Diagnosis       Atrial fibrillation  Right axis deviation  ST & T wave abnormality, consider inferolateral ischemia or digitalis effect  Prolonged QT  When compared with ECG of 26-MAR-2022 17:40,  No significant change  Confirmed by Elsie Putnam MD. ([id number]) on 3/27/2022 10:10:18 PM         Cardiac Studies/Hx:  No specialty comments available. Medical history notable for  has a past medical history of Anxiety, Arthritis, GERD (gastroesophageal reflux disease), Hypertension, and Psychiatric disorder. Social history notable for  reports that she quit smoking about 32 years ago. She quit after 8.00 years of use. She has never used smokeless tobacco. She reports that she does not drink alcohol and does not use drugs. Family history notable for family history includes Hypertension in her brother and mother; Stroke in her brother, maternal grandmother, and mother.         Past Medical History:   Diagnosis Date    Anxiety     Arthritis     GERD (gastroesophageal reflux disease)     Hypertension     Psychiatric disorder     anxiety d/o        ROS-pertinents  negative except as above  The pertinent portions of the medical history,physician and nursing notes, meds,vitals , labs and Ins/Outs,are reviewed in the electronic record  Pt reports   None  and the remainder of a complete multisystem 11 ROS  Are reviewed and negative    Social History     Tobacco Use    Smoking status: Former Smoker     Years: 8.00     Quit date: 1990     Years since quittin.2    Smokeless tobacco: Never Used   Substance Use Topics    Alcohol use: No    Drug use: No      Family History   Problem Relation Age of Onset    Stroke Maternal Grandmother     Hypertension Mother     Stroke Mother     Hypertension Brother     Stroke Brother       Prior to Admission Medications   Prescriptions Last Dose Informant Patient Reported? Taking? ALPRAZolam (XANAX) 0.5 mg tablet   No No   Sig: TAKE 1 TABLET BY MOUTH EVERY NIGHT FOR UP TO 10 DAYS AS NEEDED FOR ANXIETY. MAX DAILY AMOUNT: 0.5 MG   aspirin 81 mg chewable tablet   Yes No   Sig: Take 81 mg by mouth daily. azelastine (ASTELIN) 137 mcg (0.1 %) nasal spray   No No   Sig: USE 1 SPRAY IN EACH NOSTRIL TWICE DAILY AS DIRECTED   carbamide peroxide (DEBROX) 6.5 % otic solution   No No   Sig: Administer 5 Drops into each ear two (2) times a day. cetirizine (ZYRTEC) 10 mg tablet   Yes No   Sig: Take 1 Tablet by mouth daily. cholecalciferol (VITAMIN D3) (2,000 UNITS /50 MCG) cap capsule   No No   Sig: Take 1 Capsule by mouth daily for 90 days. famotidine (PEPCID) 40 mg tablet   No No   Sig: TAKE 1 TABLET BY MOUTH DAILY   metoprolol succinate (TOPROL-XL) 50 mg XL tablet   No No   Sig: TAKE 1 TABLET BY MOUTH DAILY   montelukast (SINGULAIR) 10 mg tablet   No No   Sig: TAKE 1 TABLET BY MOUTH EVERY DAY   prazosin (MINIPRESS) 1 mg capsule   No No   Sig: Take 1 Capsule by mouth nightly for 90 days.    valsartan-hydroCHLOROthiazide (DIOVAN-HCT) 320-12.5 mg per tablet   No No   Sig: TAKE 1 TABLET BY MOUTH DAILY      Facility-Administered Medications: None     Allergies   Allergen Reactions    Hydralazine Unknown (comments)    Other Plant, Animal, Environmental Runny Nose      Objective:    Physical Exam:   Patient Vitals for the past 12 hrs:   Temp Pulse Resp BP SpO2   03/27/22 2147  63      03/27/22 2142 98.5 °F (36.9 °C) 96 18 (!) 135/90    03/27/22 1949  (!) 38      03/27/22 1800 97.9 °F (36.6 °C) (!) 49 18 (!) 160/63 97 %   03/27/22 1400  98      03/27/22 1358 98 °F (36.7 °C) 95 16 113/68 96 % 03/27/22 1220  93  (!) 166/87    03/27/22 1200  (!) 56         General:  alert, cooperative, no distress, appears stated age   [de-identified], Neck:  NC,AT- no JVD   Chest Wall: inspection normal - no chest wall deformities or tenderness, respiratory effort normal   Lung:   clear to auscultation bilaterally   Heart:    normal rate, regular rhythm, normal S1, S2, no murmurs, rubs, clicks or gallops   Abdomen: nondistended   Extremities: extremities normal, atraumatic, no cyanosis or edema     Last 24hr Input/Output:    Intake/Output Summary (Last 24 hours) at 3/27/2022 2316  Last data filed at 3/27/2022 1200  Gross per 24 hour   Intake 300 ml   Output    Net 300 ml        Data Review:   Recent Results (from the past 24 hour(s))   HEMOGLOBIN A1C WITH EAG    Collection Time: 03/27/22 12:52 AM   Result Value Ref Range    Hemoglobin A1c 5.2 4.0 - 5.6 %    Est. average glucose 103 mg/dL   LIPID PANEL    Collection Time: 03/27/22 12:52 AM   Result Value Ref Range    Cholesterol, total 126 <200 MG/DL    Triglyceride 58 <150 MG/DL    HDL Cholesterol 61 MG/DL    LDL, calculated 53.4 0 - 100 MG/DL    VLDL, calculated 11.6 MG/DL    CHOL/HDL Ratio 2.1 0.0 - 5.0     METABOLIC PANEL, BASIC    Collection Time: 03/27/22 12:52 AM   Result Value Ref Range    Sodium 139 136 - 145 mmol/L    Potassium 3.1 (L) 3.5 - 5.1 mmol/L    Chloride 103 97 - 108 mmol/L    CO2 30 21 - 32 mmol/L    Anion gap 6 5 - 15 mmol/L    Glucose 110 (H) 65 - 100 mg/dL    BUN 11 6 - 20 MG/DL    Creatinine 0.95 0.55 - 1.02 MG/DL    BUN/Creatinine ratio 12 12 - 20      GFR est AA >60 >60 ml/min/1.73m2    GFR est non-AA 56 (L) >60 ml/min/1.73m2    Calcium 8.9 8.5 - 10.1 MG/DL   MAGNESIUM    Collection Time: 03/27/22 12:52 AM   Result Value Ref Range    Magnesium 1.9 1.6 - 2.4 mg/dL   CBC W/O DIFF    Collection Time: 03/27/22 12:52 AM   Result Value Ref Range    WBC 5.6 3.6 - 11.0 K/uL    RBC 4.45 3.80 - 5.20 M/uL    HGB 13.5 11.5 - 16.0 g/dL    HCT 41.8 35.0 - 47.0 %    MCV 93.9 80.0 - 99.0 FL    MCH 30.3 26.0 - 34.0 PG    MCHC 32.3 30.0 - 36.5 g/dL    RDW 12.5 11.5 - 14.5 %    PLATELET 983 800 - 320 K/uL    MPV 11.7 8.9 - 12.9 FL    NRBC 0.0 0  WBC    ABSOLUTE NRBC 0.00 0.00 - 0.01 K/uL   EKG, 12 LEAD, INITIAL    Collection Time: 03/27/22  2:57 PM   Result Value Ref Range    Ventricular Rate 91 BPM    Atrial Rate 227 BPM    QRS Duration 82 ms    Q-T Interval 386 ms    QTC Calculation (Bezet) 474 ms    Calculated R Axis 171 degrees    Calculated T Axis -122 degrees    Diagnosis       Atrial fibrillation  Right axis deviation  ST & T wave abnormality, consider inferolateral ischemia or digitalis effect  Prolonged QT  When compared with ECG of 26-MAR-2022 17:40,  No significant change  Confirmed by Jevon Banerjee MD. ([id number]) on 3/27/2022 10:10:18 PM     TROPONIN-HIGH SENSITIVITY    Collection Time: 03/27/22  3:13 PM   Result Value Ref Range    Troponin-High Sensitivity 8 0 - 51 ng/L      Lab Results   Component Value Date/Time    Cholesterol, total 126 03/27/2022 12:52 AM    Cholesterol, total 151 09/25/2020 12:59 PM    Cholesterol, total 153 03/11/2019 03:45 PM    Cholesterol, total 175 02/23/2018 09:24 AM    Cholesterol, total 182 08/04/2015 12:49 PM    HDL Cholesterol 61 03/27/2022 12:52 AM    HDL Cholesterol 78 09/25/2020 12:59 PM    HDL Cholesterol 71 03/11/2019 03:45 PM    HDL Cholesterol 82 02/23/2018 09:24 AM    HDL Cholesterol 89 08/04/2015 12:49 PM    LDL, calculated 53.4 03/27/2022 12:52 AM    LDL, calculated 59.6 09/25/2020 12:59 PM    LDL, calculated 66 03/11/2019 03:45 PM    LDL, calculated 81 02/23/2018 09:24 AM    LDL, calculated 80 08/04/2015 12:49 PM    Triglyceride 58 03/27/2022 12:52 AM    Triglyceride 67 09/25/2020 12:59 PM    Triglyceride 81 03/11/2019 03:45 PM    Triglyceride 58 02/23/2018 09:24 AM    Triglyceride 63 08/04/2015 12:49 PM    CHOL/HDL Ratio 2.1 03/27/2022 12:52 AM    CHOL/HDL Ratio 1.9 09/25/2020 12:59 PM      Luis Antonio Aguirre MD 3/27/2022

## 2022-03-28 NOTE — PROGRESS NOTES
[facility]                                                                                          Hospitalist Progress Note  Jose Guadalupe Mcmahon MD  Answering service: [phone] OR 36 from in house phone        Date of Service:  3/28/2022  NAME:  Nixon Ayala  :  1940  MRN:  [id number]      Admission Summary:   HPI: Eder Eaton is a 79 y.o. female with a pmhx past CVA, GERD, HTN, arthritis, and environmental allergies who presents with elevated BP, lightheadedness, and is being admitted for hypertensive urgency. She has been seeing her PCP Dr. Osorio Hess for blood pressure management. She was taking losartan//12 .5 mg daily, bisoprolol 5 mg daily, amlodipine 10 mg daily, and hydralazine 50 mg 3 times daily. She states that when she started taking the hydralazine, she started to develop a burning sensation in her throat, and ear so she decreased the frequency from 3 times a day to once daily. She returned to her PCP on 2022, and at that time hydralazine was switched to metoprolol succinate 50 mg daily, and losartan 100 mg over hydrochlorothiazide 12.5 mg was changed to valsartan 320/12.5mg daily. She subsequently began to develop lightheadedness. Her daughter checked her BP and found it to be elevated so brought her to the ED for further evaluation. She denies nausea, vomiting, vision, change, chest pain, shortness of breath or other focal deficit.     In the ED, BP was elevated to 200/105. Other vitals were stable. Creatinine was mildly elevated to 1.1 (b/l 0.7-0.9). UA significant for trace protein, small blood, moderate leukocyte esterase, 1+ bacteria, 10-20 WBCs, and many epithelial cells. CXR c/w pulmonary vascular congestion. CT head with chronic left cerebellar infarct, and chronic small vessel ischemic changes. EKG with inferolateral ST depression. Troponin is negative.     In the ED, she received 10 mg IV labetalol times two doses. \"    Interval history / Subjective:   Patient seen examined at bedside earlier. No acute complaints feels well. Blood pressure improved. Assessment & Plan:     #Hypertensive urgency  #abnormal EKG  Patient presented due to elevated blood pressures, lightheadedness. Initial blood pressure 200/105 with inferior lateral EKG changes. Endorses compliance with home medications. Status post labetalol 10 mg IV x2 doses in the ED with minimal improvement in blood pressure. On March 8, 2022, her hydralazine was stopped due to intolerance, her bisoprolol was changed to metoprolol 50 daily, and her losartan 100 mg, and hydrochlorothiazide 12.5 mg was changed to valsartan//12 .5 mg daily  continue Norvasc 10 mg daily, bb on hold due to bradycardia and sinus pause noted 3/27  . c/w [id number], and chlorthalidone 25mg.  -echo for abnormal EKG pending     Sinus bradycardia and sinus pause  -noted yesterday on tele, patient asymptomatic lasted about 3 seconds of note patient was on beta-blocker 50 mg twice daily which was held yesterday after sinus pause occurred 3/27 around noon was last dose  -Appreciate cardiology recs will allow washout for 48 hours, if evidence of further pauses will need ep to eval   -Follow-up TTE     #Previous stroke  CT head with evidence of chronic left cerebellar infarct  -pt.  C/o difficulties with balance probably combination of stroke and b/l knee replacement  -continue ASA 81mg  -check lipid panel, and HgbA1c  -echo as per above  -PT/OT > recommend HH      #B/L LE edema R>L  -b/l venous duplex     #GERD  Continue home Pepcid     #Environmental allergies  Continue home Singulair, Zyrtec, and azelastine     #Nightmares  -continue prazosin Qhs    Code status: Full   DVT prophylaxis: lovenox subq    Called Rambee (child) for update, left voicemail to call back     Care Plan discussed with: Patient/Family and Nurse  Anticipated Disposition: Home w/Family  Anticipated Discharge: 24 hours to 48 hours     Hospital Problems  Date Reviewed: 3/8/2022          Codes Class Noted POA    Accelerated hypertension ICD-10-CM: I10  ICD-9-CM: 401.0  3/26/2022 Unknown                Review of Systems:   A comprehensive review of systems was negative except for that written in the HPI. Vital Signs:    Last 24hrs VS reviewed since prior progress note. Most recent are:  Visit Vitals  BP (!) 103/54   Pulse (!) 47   Temp 98.8 °F (37.1 °C)   Resp 13   Ht 5' 10\" (1.778 m)   Wt 94.8 kg (208 lb 15.9 oz)   SpO2 96%   BMI 29.99 kg/m²       No intake or output data in the 24 hours ending 03/28/22 1455     Physical Examination:     I had a face to face encounter with this patient and independently examined them on 3/28/2022 as outlined below:          Constitutional:  No acute distress, cooperative, pleasant, obese   ENT:  Oral mucosa moist, oropharynx benign. Resp:  CTA bilaterally. No wheezing/rhonchi/rales. No accessory muscle use   CV:  Regular rhythm, normal rate, no murmurs, gallops, rubs    GI:  Soft, non distended, non tender. normoactive bowel sounds, no hepatosplenomegaly     Musculoskeletal:  No edema, warm, 2+ pulses throughout    Neurologic:  Moves all extremities.   AAOx3, CN II-XII reviewed            Data Review:    Review and/or order of clinical lab test  Review and/or order of tests in the radiology section of CPT  Review and/or order of tests in the medicine section of CPT      Labs:     Recent Labs     03/28/22  0010 03/27/22  0052   WBC 5.2 5.6   HGB 12.6 13.5   HCT 39.8 41.8    209     Recent Labs     03/28/22  0010 03/27/22  0052 03/26/22  1135    139 143   K 4.1 3.1* 3.7    103 100   CO2 30 30 31   BUN 14 11 14   CREA 0.99 0.95 1.11*   GLU 89 110* 106*   CA 8.6 8.9 9.5   MG 1.9 1.9  --      Recent Labs     03/26/22  1135   ALT 32   AP 86   TBILI 0.8   TP 7.8   ALB 3.9   GLOB 3.9     No results for input(s): INR, PTP, APTT, INREXT, INREXT in the last 72 hours. No results for input(s): FE, TIBC, PSAT, FERR in the last 72 hours. No results found for: FOL, RBCF   No results for input(s): PH, PCO2, PO2 in the last 72 hours. No results for input(s): CPK, CKNDX, TROIQ in the last 72 hours.     No lab exists for component: CPKMB  Lab Results   Component Value Date/Time    Cholesterol, total 126 03/27/2022 12:52 AM    HDL Cholesterol 61 03/27/2022 12:52 AM    LDL, calculated 53.4 03/27/2022 12:52 AM    Triglyceride 58 03/27/2022 12:52 AM    CHOL/HDL Ratio 2.1 03/27/2022 12:52 AM     Lab Results   Component Value Date/Time    Glucose,  (H) 06/17/2013 10:57 AM     Lab Results   Component Value Date/Time    Color YELLOW/STRAW 03/26/2022 12:26 PM    Appearance HAZY (A) 03/26/2022 12:26 PM    Specific gravity 1.015 03/26/2022 12:26 PM    pH (UA) 7.0 03/26/2022 12:26 PM    Protein TRACE (A) 03/26/2022 12:26 PM    Glucose Negative 03/26/2022 12:26 PM    Ketone Negative 03/26/2022 12:26 PM    Bilirubin Negative 03/26/2022 12:26 PM    Urobilinogen 1.0 03/26/2022 12:26 PM    Nitrites Negative 03/26/2022 12:26 PM    Leukocyte Esterase MODERATE (A) 03/26/2022 12:26 PM    Epithelial cells MANY (A) 03/26/2022 12:26 PM    Bacteria 1+ (A) 03/26/2022 12:26 PM    WBC 10-20 03/26/2022 12:26 PM    RBC 5-10 03/26/2022 12:26 PM         Medications Reviewed:     Current Facility-Administered Medications   Medication Dose Route Frequency    prazosin (MINIPRESS) capsule 1 mg  1 mg Oral QHS    sodium chloride (NS) flush 5-40 mL  5-40 mL IntraVENous Q8H    sodium chloride (NS) flush 5-40 mL  5-40 mL IntraVENous PRN    acetaminophen (TYLENOL) tablet 650 mg  650 mg Oral Q6H PRN    Or    acetaminophen (TYLENOL) suppository 650 mg  650 mg Rectal Q6H PRN    ondansetron (ZOFRAN ODT) tablet 4 mg  4 mg Oral Q8H PRN    Or    ondansetron (ZOFRAN) injection 4 mg  4 mg IntraVENous Q6H PRN    enoxaparin (LOVENOX) injection 40 mg  40 mg SubCUTAneous DAILY    [Held by provider] metoprolol succinate (TOPROL-XL) XL tablet 50 mg  50 mg Oral BID    amLODIPine (NORVASC) tablet 10 mg  10 mg Oral DAILY    aspirin chewable tablet 81 mg  81 mg Oral DAILY    cetirizine (ZYRTEC) tablet 10 mg  10 mg Oral DAILY    losartan (COZAAR) tablet 100 mg  100 mg Oral DAILY    montelukast (SINGULAIR) tablet 10 mg  10 mg Oral DAILY    famotidine (PEPCID) tablet 40 mg  40 mg Oral DAILY    cholecalciferol (VITAMIN D3) (1000 Units /25 mcg) tablet 2,000 Units  2,000 Units Oral DAILY    azelastine (ASTELIN) 137mcg/spray nasal spray  1 Spray Both Nostrils BID    chlorthalidone (HYGROTON) tablet 25 mg  25 mg Oral DAILY     ______________________________________________________________________  EXPECTED LENGTH OF STAY: - - -  ACTUAL LENGTH OF STAY:          0                 Jennie Mejias MD

## 2022-03-28 NOTE — PROGRESS NOTES
MARISOL  CRM notes that Georgetta Siemens RN from  called to inform me that patient was observation. Stephanie Booth is now Medicare inpatient and a letter was given to patient regarding above. I am meeting with patient about choosing a home health agency.

## 2022-03-28 NOTE — PROGRESS NOTES
TRANSITIONS OF CARE:  DISPOSITION: PATIENT WILL RETURN HOME AND HER DAUGHTER LIVES WITH HER. CRM met with patient at the bedside and she discussed home health with the cardiac Nurse [address]. Patient given choices for [facility] and has picked [facility] for Skilled nursing visits to monitor bld pressure.

## 2022-03-28 NOTE — TELEPHONE ENCOUNTER
Call transferred back, patient's daughter is on the phone, she was letting Dr Bird Grant know that her mom (the patient) is at the hospital, they did an EKG which was found abnormal. Patient wanting to go over the EKG results, explained to her that the ordering physician is the one who would discuss the labs and the meaning of them.  I asked the patient if there was anything in particular that she was asking about- but she hung up

## 2022-03-29 ENCOUNTER — APPOINTMENT (OUTPATIENT)
Dept: NON INVASIVE DIAGNOSTICS | Age: 82
DRG: 305 | End: 2022-03-29
Attending: STUDENT IN AN ORGANIZED HEALTH CARE EDUCATION/TRAINING PROGRAM
Payer: MEDICARE

## 2022-03-29 VITALS
OXYGEN SATURATION: 94 % | TEMPERATURE: 98 F | WEIGHT: 210.76 LBS | HEIGHT: 70 IN | DIASTOLIC BLOOD PRESSURE: 61 MMHG | RESPIRATION RATE: 20 BRPM | BODY MASS INDEX: 30.17 KG/M2 | SYSTOLIC BLOOD PRESSURE: 174 MMHG | HEART RATE: 56 BPM

## 2022-03-29 LAB
ANION GAP SERPL CALC-SCNC: 3 MMOL/L (ref 5–15)
ATRIAL RATE: 50 BPM
BASOPHILS # BLD: 0 K/UL (ref 0–0.1)
BASOPHILS NFR BLD: 1 % (ref 0–1)
BUN SERPL-MCNC: 16 MG/DL (ref 6–20)
BUN/CREAT SERPL: 18 (ref 12–20)
CALCIUM SERPL-MCNC: 9 MG/DL (ref 8.5–10.1)
CALCULATED R AXIS, ECG10: 18 DEGREES
CALCULATED T AXIS, ECG11: -83 DEGREES
CHLORIDE SERPL-SCNC: 105 MMOL/L (ref 97–108)
CO2 SERPL-SCNC: 30 MMOL/L (ref 21–32)
CREAT SERPL-MCNC: 0.9 MG/DL (ref 0.55–1.02)
DIAGNOSIS, 93000: NORMAL
DIFFERENTIAL METHOD BLD: NORMAL
ECHO AO ROOT DIAM: 2.9 CM
ECHO AO ROOT INDEX: 1.36 CM/M2
ECHO AV MEAN GRADIENT: 3 MMHG
ECHO AV MEAN VELOCITY: 0.9 M/S
ECHO AV PEAK GRADIENT: 6 MMHG
ECHO AV PEAK VELOCITY: 1.3 M/S
ECHO AV VTI: 27.3 CM
ECHO EST RA PRESSURE: 3 MMHG
ECHO LA DIAMETER INDEX: 2.11 CM/M2
ECHO LA DIAMETER: 4.5 CM
ECHO LA TO AORTIC ROOT RATIO: 1.55
ECHO LA VOL 2C: 67 ML (ref 22–52)
ECHO LA VOL 4C: 61 ML (ref 22–52)
ECHO LA VOLUME AREA LENGTH: 77 ML
ECHO LA VOLUME INDEX A2C: 31 ML/M2 (ref 16–34)
ECHO LA VOLUME INDEX A4C: 29 ML/M2 (ref 16–34)
ECHO LA VOLUME INDEX AREA LENGTH: 36 ML/M2 (ref 16–34)
ECHO LV E' LATERAL VELOCITY: 9 CM/S
ECHO LV E' SEPTAL VELOCITY: 7 CM/S
ECHO LV FRACTIONAL SHORTENING: 28 % (ref 28–44)
ECHO LV INTERNAL DIMENSION DIASTOLE INDEX: 2.49 CM/M2
ECHO LV INTERNAL DIMENSION DIASTOLIC: 5.3 CM (ref 3.9–5.3)
ECHO LV INTERNAL DIMENSION SYSTOLIC INDEX: 1.78 CM/M2
ECHO LV INTERNAL DIMENSION SYSTOLIC: 3.8 CM
ECHO LV IVSD: 0.8 CM (ref 0.6–0.9)
ECHO LV MASS 2D: 187.3 G (ref 67–162)
ECHO LV MASS INDEX 2D: 87.9 G/M2 (ref 43–95)
ECHO LV POSTERIOR WALL DIASTOLIC: 1.1 CM (ref 0.6–0.9)
ECHO LV RELATIVE WALL THICKNESS RATIO: 0.42
ECHO MV A VELOCITY: 0.47 M/S
ECHO MV AREA PHT: 3.4 CM2
ECHO MV E DECELERATION TIME (DT): 226.1 MS
ECHO MV E VELOCITY: 0.54 M/S
ECHO MV E/A RATIO: 1.15
ECHO MV E/E' LATERAL: 6
ECHO MV E/E' RATIO (AVERAGED): 6.86
ECHO MV E/E' SEPTAL: 7.71
ECHO MV PRESSURE HALF TIME (PHT): 65.6 MS
ECHO PULMONARY ARTERY SYSTOLIC PRESSURE (PASP): 40 MMHG
ECHO RIGHT VENTRICULAR SYSTOLIC PRESSURE (RVSP): 47 MMHG
ECHO TV REGURGITANT MAX VELOCITY: 3.33 M/S
ECHO TV REGURGITANT PEAK GRADIENT: 44 MMHG
EOSINOPHIL # BLD: 0.1 K/UL (ref 0–0.4)
EOSINOPHIL NFR BLD: 2 % (ref 0–7)
ERYTHROCYTE [DISTWIDTH] IN BLOOD BY AUTOMATED COUNT: 12.6 % (ref 11.5–14.5)
GLUCOSE SERPL-MCNC: 90 MG/DL (ref 65–100)
HCT VFR BLD AUTO: 39 % (ref 35–47)
HGB BLD-MCNC: 12.4 G/DL (ref 11.5–16)
IMM GRANULOCYTES # BLD AUTO: 0 K/UL (ref 0–0.04)
IMM GRANULOCYTES NFR BLD AUTO: 0 % (ref 0–0.5)
LYMPHOCYTES # BLD: 2.2 K/UL (ref 0.8–3.5)
LYMPHOCYTES NFR BLD: 43 % (ref 12–49)
MAGNESIUM SERPL-MCNC: 2.1 MG/DL (ref 1.6–2.4)
MCH RBC QN AUTO: 30.2 PG (ref 26–34)
MCHC RBC AUTO-ENTMCNC: 31.8 G/DL (ref 30–36.5)
MCV RBC AUTO: 94.9 FL (ref 80–99)
MONOCYTES # BLD: 0.6 K/UL (ref 0–1)
MONOCYTES NFR BLD: 12 % (ref 5–13)
NEUTS SEG # BLD: 2.1 K/UL (ref 1.8–8)
NEUTS SEG NFR BLD: 42 % (ref 32–75)
NRBC # BLD: 0 K/UL (ref 0–0.01)
NRBC BLD-RTO: 0 PER 100 WBC
PLATELET # BLD AUTO: 191 K/UL (ref 150–400)
PMV BLD AUTO: 10.9 FL (ref 8.9–12.9)
POTASSIUM SERPL-SCNC: 3.9 MMOL/L (ref 3.5–5.1)
Q-T INTERVAL, ECG07: 384 MS
QRS DURATION, ECG06: 88 MS
QTC CALCULATION (BEZET), ECG08: 482 MS
RBC # BLD AUTO: 4.11 M/UL (ref 3.8–5.2)
SODIUM SERPL-SCNC: 138 MMOL/L (ref 136–145)
TSH SERPL DL<=0.05 MIU/L-ACNC: 1.56 UIU/ML (ref 0.36–3.74)
VENTRICULAR RATE, ECG03: 95 BPM
WBC # BLD AUTO: 5.1 K/UL (ref 3.6–11)

## 2022-03-29 PROCEDURE — 74011250637 HC RX REV CODE- 250/637: Performed by: STUDENT IN AN ORGANIZED HEALTH CARE EDUCATION/TRAINING PROGRAM

## 2022-03-29 PROCEDURE — 93306 TTE W/DOPPLER COMPLETE: CPT

## 2022-03-29 PROCEDURE — 99232 SBSQ HOSP IP/OBS MODERATE 35: CPT | Performed by: INTERNAL MEDICINE

## 2022-03-29 PROCEDURE — 74011250636 HC RX REV CODE- 250/636: Performed by: FAMILY MEDICINE

## 2022-03-29 PROCEDURE — 85025 COMPLETE CBC W/AUTO DIFF WBC: CPT

## 2022-03-29 PROCEDURE — 80048 BASIC METABOLIC PNL TOTAL CA: CPT

## 2022-03-29 PROCEDURE — 74011250637 HC RX REV CODE- 250/637: Performed by: NURSE PRACTITIONER

## 2022-03-29 PROCEDURE — 84443 ASSAY THYROID STIM HORMONE: CPT

## 2022-03-29 PROCEDURE — 93306 TTE W/DOPPLER COMPLETE: CPT | Performed by: INTERNAL MEDICINE

## 2022-03-29 PROCEDURE — 83735 ASSAY OF MAGNESIUM: CPT

## 2022-03-29 PROCEDURE — 74011250637 HC RX REV CODE- 250/637: Performed by: FAMILY MEDICINE

## 2022-03-29 PROCEDURE — 36415 COLL VENOUS BLD VENIPUNCTURE: CPT

## 2022-03-29 PROCEDURE — 94760 N-INVAS EAR/PLS OXIMETRY 1: CPT

## 2022-03-29 RX ORDER — LOSARTAN POTASSIUM 100 MG/1
100 TABLET ORAL DAILY
Qty: 30 TABLET | Refills: 0 | Status: SHIPPED | OUTPATIENT
Start: 2022-03-30 | End: 2022-04-08 | Stop reason: SDUPTHER

## 2022-03-29 RX ORDER — AMLODIPINE BESYLATE 10 MG/1
10 TABLET ORAL DAILY
Qty: 30 TABLET | Refills: 0 | Status: SHIPPED | OUTPATIENT
Start: 2022-03-30 | End: 2022-04-08 | Stop reason: SDUPTHER

## 2022-03-29 RX ORDER — SPIRONOLACTONE 25 MG/1
25 TABLET ORAL DAILY
Qty: 30 TABLET | Refills: 0 | Status: SHIPPED | OUTPATIENT
Start: 2022-03-30 | End: 2022-03-30

## 2022-03-29 RX ORDER — CHLORTHALIDONE 25 MG/1
25 TABLET ORAL DAILY
Qty: 30 TABLET | Refills: 0 | Status: SHIPPED | OUTPATIENT
Start: 2022-03-30 | End: 2022-04-08 | Stop reason: SDUPTHER

## 2022-03-29 RX ORDER — SPIRONOLACTONE 25 MG/1
25 TABLET ORAL DAILY
Status: DISCONTINUED | OUTPATIENT
Start: 2022-03-29 | End: 2022-03-29 | Stop reason: HOSPADM

## 2022-03-29 RX ADMIN — Medication 2000 UNITS: at 08:20

## 2022-03-29 RX ADMIN — ASPIRIN 81 MG 81 MG: 81 TABLET ORAL at 08:20

## 2022-03-29 RX ADMIN — CHLORTHALIDONE 25 MG: 25 TABLET ORAL at 08:19

## 2022-03-29 RX ADMIN — LOSARTAN POTASSIUM 100 MG: 50 TABLET, FILM COATED ORAL at 08:19

## 2022-03-29 RX ADMIN — MONTELUKAST 10 MG: 10 TABLET, FILM COATED ORAL at 08:19

## 2022-03-29 RX ADMIN — ENOXAPARIN SODIUM 40 MG: 100 INJECTION SUBCUTANEOUS at 08:21

## 2022-03-29 RX ADMIN — FAMOTIDINE 40 MG: 20 TABLET, FILM COATED ORAL at 08:19

## 2022-03-29 RX ADMIN — AMLODIPINE BESYLATE 10 MG: 5 TABLET ORAL at 08:19

## 2022-03-29 RX ADMIN — CETIRIZINE HYDROCHLORIDE 10 MG: 10 TABLET, FILM COATED ORAL at 08:19

## 2022-03-29 RX ADMIN — AZELASTINE HYDROCHLORIDE 1 SPRAY: 137 SPRAY, METERED NASAL at 08:20

## 2022-03-29 RX ADMIN — SPIRONOLACTONE 25 MG: 25 TABLET ORAL at 11:26

## 2022-03-29 NOTE — PROGRESS NOTES
Cardiology Progress Note            Admit Date: 3/26/2022  Admit Diagnosis: Accelerated hypertension [I10]  Date: 3/29/2022     Time: 12:01 PM    HPI:   is an 79 y.o. female with PMH of HTN, anxiety, GERD. She presented for uncontrolled BP and lightheadedness. She demonstrated pauses of up to 3 seconds (sinus bradycardia) and home metoprolol was stopped. (pt was on toprol XL 50 daily PTA)  Cardiology consulted for bradycardia. Subjective: today, patient denies symptoms of fatigue, lightheadedness, dizziness, CP, or SOB. No near syncope. Telemetry showing sinus bradycardia upper 40-50s. No significant pauses noted. Assessment and Plan     1. Pauses:     -No further significant pauses noted on telemetry. SB on tele review. Pt remains asymptomatic. Echo pending. Remain off toprol (last dose 3/27 noon). Given that she is asymptomatic and no further significant pauses she can be d/c from cardiology standpoint. Follow up outpt with Dr Portillo Sarabia.    2. Malignant HTN   -BP not well controlled off BB   -Continue Losartan(Valsartan-HCTZ PTA),amlodipine. (On prazosin for nightmares) and chlorthalidone. With renal function normal will add Spironolactone 25mg daily. 3. Dyslipidemia   -at goal.3/27/22 LDL 53, HDL 61. No statin PTA. Dev Sullivan, NP    Saw and evaluated pt and agree with above assessment and plan. Doing ok off BB. ok for hospital dc cardiac wise. Close outpt follow up with Dr. Leopoldo Mccune, MD    Cardiac testing history:  Echo pending  Dobutamine Stress echo 8/2013: negative for ischemia.     PMH  Past Medical History:   Diagnosis Date    Anxiety     Arthritis     GERD (gastroesophageal reflux disease)     Hypertension     Psychiatric disorder     anxiety d/o      Social Hx  Social History     Socioeconomic History    Marital status:      Spouse name: Not on file    Number of children: Not on file    Years of education: Not on file    Highest education level: Not on file   Occupational History    Not on file   Tobacco Use    Smoking status: Former Smoker     Years: 8.00     Quit date: 1990     Years since quittin.2    Smokeless tobacco: Never Used   Substance and Sexual Activity    Alcohol use: No    Drug use: No    Sexual activity: Not Currently   Other Topics Concern    Not on file   Social History Narrative    ** Merged History Encounter **          Social Determinants of Health     Financial Resource Strain:     Difficulty of Paying Living Expenses: Not on file   Food Insecurity:     Worried About Running Out of Food in the Last Year: Not on file    Ronnell of Food in the Last Year: Not on file   Transportation Needs:     Lack of Transportation (Medical): Not on file    Lack of Transportation (Non-Medical): Not on file   Physical Activity:     Days of Exercise per Week: Not on file    Minutes of Exercise per Session: Not on file   Stress:     Feeling of Stress : Not on file   Social Connections:     Frequency of Communication with Friends and Family: Not on file    Frequency of Social Gatherings with Friends and Family: Not on file    Attends [employer] Services: Not on file    Active Member of [address] [place] [address] or Organizations: Not on file    Attends Club or Organization Meetings: Not on file    Marital Status: Not on file   Intimate Partner Violence:     Fear of Current or Ex-Partner: Not on file    Emotionally Abused: Not on file    Physically Abused: Not on file    Sexually Abused: Not on file   Housing Stability:     Unable to Pay for Housing in the Last Year: Not on file    Number of [place] in the Last Year: Not on file    Unstable Housing in the Last Year: Not on file       ROS:  Pertinent items are noted in HPI.     Objective:      Physical Exam:                Visit Vitals  BP (!) 173/79   Pulse (!) 53   Temp 97.9 °F (36.6 °C)   Resp 20   Ht 5' 10\" (1.778 m)   Wt 95.6 kg (210 lb 12.8 oz)   SpO2 96%   BMI 30.25 kg/m²          General Appearance:   Well developed, well nourished,alert and oriented x 3, and   individual in no acute distress. Ears/Nose/Mouth/Throat:  Hearing grossly normal.    Neck:  Supple. Chest:    Lungs clear to auscultation bilaterally. Cardiovascular:   Regular rate and rhythm, S1, S2 normal, no murmur. Abdomen:    Soft, non-tender, bowel sounds are active. Extremities:  No edema bilaterally. Skin:  Warm and dry. Telemetry: Sinus bradycardia, no signficant pauses. Data Review:    Labs:    Recent Results (from the past 24 hour(s))   METABOLIC PANEL, BASIC    Collection Time: 03/29/22  2:33 AM   Result Value Ref Range    Sodium 138 136 - 145 mmol/L    Potassium 3.9 3.5 - 5.1 mmol/L    Chloride 105 97 - 108 mmol/L    CO2 30 21 - 32 mmol/L    Anion gap 3 (L) 5 - 15 mmol/L    Glucose 90 65 - 100 mg/dL    BUN 16 6 - 20 MG/DL    Creatinine 0.90 0.55 - 1.02 MG/DL    BUN/Creatinine ratio 18 12 - 20      GFR est AA >60 >60 ml/min/1.73m2    GFR est non-AA 60 (L) >60 ml/min/1.73m2    Calcium 9.0 8.5 - 10.1 MG/DL   MAGNESIUM    Collection Time: 03/29/22  2:33 AM   Result Value Ref Range    Magnesium 2.1 1.6 - 2.4 mg/dL   CBC WITH AUTOMATED DIFF    Collection Time: 03/29/22  2:33 AM   Result Value Ref Range    WBC 5.1 3.6 - 11.0 K/uL    RBC 4.11 3.80 - 5.20 M/uL    HGB 12.4 11.5 - 16.0 g/dL    HCT 39.0 35.0 - 47.0 %    MCV 94.9 80.0 - 99.0 FL    MCH 30.2 26.0 - 34.0 PG    MCHC 31.8 30.0 - 36.5 g/dL    RDW 12.6 11.5 - 14.5 %    PLATELET 863 745 - 965 K/uL    MPV 10.9 8.9 - 12.9 FL    NRBC 0.0 0  WBC    ABSOLUTE NRBC 0.00 0.00 - 0.01 K/uL    NEUTROPHILS 42 32 - 75 %    LYMPHOCYTES 43 12 - 49 %    MONOCYTES 12 5 - 13 %    EOSINOPHILS 2 0 - 7 %    BASOPHILS 1 0 - 1 %    IMMATURE GRANULOCYTES 0 0.0 - 0.5 %    ABS. NEUTROPHILS 2.1 1.8 - 8.0 K/UL    ABS. LYMPHOCYTES 2.2 0.8 - 3.5 K/UL    ABS. MONOCYTES 0.6 0.0 - 1.0 K/UL    ABS. EOSINOPHILS 0.1 0.0 - 0.4 K/UL    ABS. BASOPHILS 0.0 0.0 - 0.1 K/UL    ABS. IMM.  GRANS. 0.0 0.00 - 0.04 K/UL    DF AUTOMATED     TSH 3RD GENERATION    Collection Time: 03/29/22  2:33 AM   Result Value Ref Range    TSH 1.56 0.36 - 3.74 uIU/mL               Current Facility-Administered Medications   Medication Dose Route Frequency    spironolactone (ALDACTONE) tablet 25 mg  25 mg Oral DAILY    prazosin (MINIPRESS) capsule 1 mg  1 mg Oral QHS    sodium chloride (NS) flush 5-40 mL  5-40 mL IntraVENous Q8H    sodium chloride (NS) flush 5-40 mL  5-40 mL IntraVENous PRN    acetaminophen (TYLENOL) tablet 650 mg  650 mg Oral Q6H PRN    Or    acetaminophen (TYLENOL) suppository 650 mg  650 mg Rectal Q6H PRN    ondansetron (ZOFRAN ODT) tablet 4 mg  4 mg Oral Q8H PRN    Or    ondansetron (ZOFRAN) injection 4 mg  4 mg IntraVENous Q6H PRN    enoxaparin (LOVENOX) injection 40 mg  40 mg SubCUTAneous DAILY    [Held by provider] metoprolol succinate (TOPROL-XL) XL tablet 50 mg  50 mg Oral BID    amLODIPine (NORVASC) tablet 10 mg  10 mg Oral DAILY    aspirin chewable tablet 81 mg  81 mg Oral DAILY    cetirizine (ZYRTEC) tablet 10 mg  10 mg Oral DAILY    losartan (COZAAR) tablet 100 mg  100 mg Oral DAILY    montelukast (SINGULAIR) tablet 10 mg  10 mg Oral DAILY    famotidine (PEPCID) tablet 40 mg  40 mg Oral DAILY    cholecalciferol (VITAMIN D3) (1000 Units /25 mcg) tablet 2,000 Units  2,000 Units Oral DAILY    azelastine (ASTELIN) 137mcg/spray nasal spray  1 Spray Both Nostrils BID    chlorthalidone (HYGROTON) tablet 25 mg  25 mg Oral DAILY          Fifi Lau NP     [facility] [address], [address] [facility] [address]   [address]   [phone]

## 2022-03-29 NOTE — DISCHARGE INSTRUCTIONS
Tanika Nix MD   Physician   Internal Medicine   Discharge Summary      Signed   Date of Service:  03/29/22 1321                         []Marcela copied text    []Mook for details       Discharge Summary         PATIENT ID: Sharee Moffett  MRN: [id number]   YOB: 1940    DATE OF ADMISSION: 3/26/2022 11:15 AM    DATE OF DISCHARGE: 03/29/22   PRIMARY CARE PROVIDER: Brittani Jamison MD      ATTENDING PHYSICIAN: William Chu MD  DISCHARGING PROVIDER: William Chu MD    To contact this individual call [phone] and ask the  to page. If unavailable ask to be transferred the Adult Hospitalist Department.     CONSULTATIONS: IP CONSULT TO CARDIOLOGY     PROCEDURES/SURGERIES: * No surgery found *     ADMITTING DIAGNOSES & HOSPITAL COURSE:   HPI: Halima Stephenson is a 79 y. o. female with a pmhx past CVA, GERD, HTN, arthritis, and environmental allergies who presents with elevated BP, lightheadedness, and is being admitted for hypertensive urgency.  She has been seeing her PCP Dr. Trinity Elizabeth for blood pressure management. Arturo Fitzpatrick was taking losartan//12 .5 mg daily, bisoprolol 5 mg daily, amlodipine 10 mg daily, and hydralazine 50 mg 3 times daily.  She states that when she started taking the hydralazine, she started to develop a burning sensation in her throat, and ear so she decreased the frequency from 3 times a day to once daily.  She returned to her PCP on March 8, 2022, and at that time hydralazine was switched to metoprolol succinate 50 mg daily, and losartan 100 mg over hydrochlorothiazide 12.5 mg was changed to valsartan 320/12.5mg daily.  She subsequently began to develop lightheadedness.  Her daughter checked her BP and found it to be elevated so brought her to the ED for further evaluation.  She denies nausea, vomiting, vision, change, chest pain, shortness of breath or other focal deficit.     In the ED, BP was elevated to 200/105.  Other vitals were stable.  Creatinine was mildly elevated to 1.1 (b/l 0.7-0.9).   UA significant for trace protein, small blood, moderate leukocyte esterase, 1+ bacteria, 10-20 WBCs, and many epithelial cells. CXR c/w pulmonary vascular congestion. CT head with chronic left cerebellar infarct, and chronic small vessel ischemic changes.  EKG with inferolateral ST depression.  Troponin is negative.     In the ED, she received 10 mg IV labetalol times two doses. \"     Patient managed for hypertensive urgency status post IV labetalol continue on Norvasc, losartan and chlorthalidone. Patient developed 3-second sinus pauses after administration of metoprolol which was held thereafter. Evaluated by cardiology after 48-hour period of washout no further evidence of pauses on tele. Stable for d/c from cardiology standpt. TTE was done and was within normal limit. Instructed patient daughter over the phone a Norvasc, losartan, chlorthalidone at current dosages. Follow-up with PCP, cardiology outpatient. PT eval patient > recommend HH. D/c with HH today.                  DISCHARGE DIAGNOSES / PLAN:       #Hypertensive urgency -resolved   #abnormal EKG  -Patient presented due to elevated blood pressures, lightheadedness.  Initial blood pressure 200/105 with inferior lateral EKG changes.  Endorses compliance with home medications.  -Status post labetalol 10 mg IV x2 doses in the ED with minimal improvement in blood pressure.    -On March 8, 2022, her hydralazine was stopped due to intolerance, her bisoprolol was changed to metoprolol 50 daily, and her losartan 100 mg, and hydrochlorothiazide 12.5 mg was changed to valsartan//12 .5 mg daily  -continue Norvasc 10 mg daily, bb on hold due to bradycardia and sinus pause noted 3/27  .  c/w [id number], and chlorthalidone 25mg.  After 48 hr period of washout no further sinus pauses, stable for d/c from cardiology standpt   -TTE show normal EF       Sinus bradycardia and sinus pause  -noted yesterday on tele, patient asymptomatic lasted about 3 seconds of note patient was on beta-blocker 50 mg twice daily which was held yesterday after sinus pause occurred 3/27 around noon was last dose  -Appreciate cardiology recs will allow washout for 48 hours, if evidence of further pauses will need ep to eval   -Normal EF on TTE      #Previous stroke  -CT head with evidence of chronic left cerebellar infarct  -pt. C/o difficulties with balance probably combination of stroke and b/l knee replacement  -continue ASA 81mg  -check lipid panel, and HgbA1c  -echo as per above  -PT/OT > recommend HH      #B/L LE edema R>L  -b/l venous duplex     #GERD  -Continue home Pepcid     #Environmental allergies  -Continue home Singulair, Zyrtec, and azelastine     #Nightmares  -continue prazosin Qhs     Code status: Full   DVT prophylaxis: lovenox paige     Called [place] (child) for update     Care Plan discussed with: Patient/Family and Nurse  Antic[facility]ated Disposition: [facility]  Anticipated Discharge: 24 hours to 48 hours               FOLLOW UP APPOINTMENTS:             Follow-up Information      Follow up With Specialties Details Why [facility]        skilled nursing assessment along with review of medications.   THEIR PHONE [phone]     Nisreen Madden MD Cardiology In 1 week office will call with appointment [address]  [address] [facility]        Lavinia Ortiz MD Internal Medicine, Bariatrics In 3 days   [address] [place] [address] [address] [facility] [place] [address]  [phone]        Lavinia Ortiz MD Internal Medicine, [facility]     [address] [address]                 ADDITIONAL CARE RECOMMENDATIONS: rpt cbc and bmp 3-5 days, monitor bp at home if sbp reaches greater than 160 please give pcp call for bp med adjustments.      DIET: Resume previous diet     ACTIVITY: Activity as tolerated           DISCHARGE MEDICATIONS:        Current Discharge Medication List     START taking these medications     Details   amLODIPine (NORVASC) 10 mg tablet Take 1 Tablet by mouth daily for 30 days. Qty: 30 Tablet, Refills: 0  Start date: 3/30/2022, End date: 4/29/2022       chlorthalidone (HYGROTON) 25 mg tablet Take 1 Tablet by mouth daily for 30 days. Qty: 30 Tablet, Refills: 0  Start date: 3/30/2022, End date: 4/29/2022       losartan (COZAAR) 100 mg tablet Take 1 Tablet by mouth daily for 30 days. Qty: 30 Tablet, Refills: 0  Start date: 3/30/2022, End date: 4/29/2022       spironolactone (ALDACTONE) 25 mg tablet Take 1 Tablet by mouth daily for 30 days. Qty: 30 Tablet, Refills: 0  Start date: 3/30/2022, End date: 4/29/2022                 CONTINUE these medications which have NOT CHANGED     Details   prazosin (MINIPRESS) 1 mg capsule Take 1 Capsule by mouth nightly for 90 days. Qty: 90 Capsule, Refills: 0     Associated Diagnoses: Nightmares       cholecalciferol (VITAMIN D3) (2,000 UNITS /50 MCG) cap capsule Take 1 Capsule by mouth daily for 90 days. Qty: 30 Capsule, Refills: 2     Associated Diagnoses: Vitamin D deficiency       famotidine (PEPCID) 40 mg tablet TAKE 1 TABLET BY MOUTH DAILY  Qty: 90 Tablet, Refills: 0     Associated Diagnoses: Gastroesophageal reflux disease without esophagitis       montelukast (SINGULAIR) 10 mg tablet TAKE 1 TABLET BY MOUTH EVERY DAY  Qty: 90 Tablet, Refills: 0     Associated Diagnoses: Environmental and seasonal allergies       cetirizine (ZYRTEC) 10 mg tablet Take 1 Tablet by mouth daily.       azelastine (ASTELIN) 137 mcg (0.1 %) nasal spray USE 1 SPRAY IN EACH NOSTRIL TWICE DAILY AS DIRECTED  Qty: 1 Bottle, Refills: 2     Associated Diagnoses: Environmental and seasonal allergies       ALPRAZolam (XANAX) 0.5 mg tablet TAKE 1 TABLET BY MOUTH EVERY NIGHT FOR UP TO 10 DAYS AS NEEDED FOR ANXIETY.  MAX DAILY AMOUNT: 0.5 MG  Qty: 10 Tab, Refills: 0     Associated Diagnoses: Anxiety attack       carbamide peroxide (DEBROX) 6.5 % otic solution Administer 5 Drops into each ear two (2) times a day. Qty: 7.5 mL, Refills: 0     Associated Diagnoses: Left otitis media, unspecified otitis media type; Left ear impacted cerumen       aspirin 81 mg chewable tablet Take 81 mg by mouth daily.                STOP taking these medications         metoprolol succinate (TOPROL-XL) 50 mg XL tablet Comments:   Reason for Stopping:            valsartan-hydroCHLOROthiazide (DIOVAN-HCT) 320-12.5 mg per tablet Comments:   Reason for Stopping:                       NOTIFY YOUR PHYSICIAN FOR ANY OF THE FOLLOWING:   Fever over 101 degrees for 24 hours. Chest pain, shortness of breath, fever, chills, nausea, vomiting, diarrhea, change in mentation, falling, weakness, bleeding. Severe pain or pain not relieved by medications. Or, any other signs or symptoms that you may have questions about.     DISPOSITION:    Home With:    OT   PT x HH   RN        Long term SNF/Inpatient Rehab     Independent/assisted living     Hospice     Other:         PATIENT CONDITION AT DISCHARGE:      Functional status     Poor      Deconditioned    x Independent       Cognition    x Lucid      Forgetful      Dementia          Code status    x Full code      DNR       PHYSICAL EXAMINATION AT DISCHARGE:  I had a face to face encounter with this patient and independently examined them on 3/29/2022 as outlined below:                                                   Constitutional:  No acute distress, cooperative, pleasant, obese   ENT:  Oral mucosa moist, oropharynx benign. Resp:  CTA bilaterally. No wheezing/rhonchi/rales. No accessory muscle use   CV:  Regular rhythm, normal rate, no murmurs, gallops, rubs    GI:  Soft, non distended, non tender. normoactive bowel sounds, no hepatosplenomegaly     Musculoskeletal:  No edema, warm, 2+ pulses throughout    Neurologic:  Moves all extremities.   AAOx3, CN II-XII reviewed                                    CHRONIC MEDICAL DIAGNOSES: Problem List as of 3/29/2022 Date Reviewed: 3/8/2022           Codes Class Noted - Resolved     Accelerated hypertension ICD-10-CM: I10  ICD-9-CM: 401.0   3/26/2022 - Present           Gastroesophageal reflux disease without esophagitis ICD-10-CM: K21.9  ICD-9-CM: 530.81   8/4/2015 - Present           Anxiety disorder ICD-10-CM: F41.9  ICD-9-CM: 300.00   8/4/2015 - Present           OA (osteoarthritis) ICD-10-CM: M19.90  ICD-9-CM: 715.90   4/15/2013 - Present           HTN (hypertension) ICD-10-CM: I10  ICD-9-CM: 439. 9   4/15/2013 - Present           Allergic rhinitis ICD-10-CM: J30.9  ICD-9-CM: 477.9   4/15/2013 - Present           RESOLVED: Anxiety ICD-10-CM: F41.9  ICD-9-CM: 300.00   4/15/2013 - 2/15/2019           RESOLVED: Systolic murmur SIX-18-BP: R01.1  ICD-9-CM: 061. 2   4/15/2013 - 3/20/2019                   Greater than 30 minutes were spent with the patient on counseling and coordination of care     Signed:   Jamarcus Gaston MD  3/29/2022  1:22 PM                 Routing History intact

## 2022-03-29 NOTE — PROGRESS NOTES
Transition of Care Plan   RUR- Low 6%   DISPOSITION: Home with daughter + [facility]   F/U with PCP/Specialist     Transport: Daughter    CM uploaded order for PT/SN in [place] sent to [facility]. CM went to the bedside to deliver IM letter and patient had already left with family. Betsy Gomes) [email or web address].

## 2022-03-29 NOTE — PROGRESS NOTES
Problem: Falls - Risk of  Goal: *Absence of Falls  Description: Document [place] Fall Risk and appropriate interventions in the flowsheet.   Outcome: Progressing Towards Goal  Note: Fall Risk Interventions:  Mobility Interventions: Communicate number of staff needed for ambulation/transfer,Patient to call before getting OOB,PT Consult for mobility concerns    Mentation Interventions: Adequate sleep, hydration, pain control,Door open when patient unattended,Evaluate medications/consider consulting pharmacy    Medication Interventions: Evaluate medications/consider consulting pharmacy,Patient to call before getting OOB    Elimination Interventions: Call light in reach,Patient to call for help with toileting needs    History of Falls Interventions: Consult care management for discharge planning,Door open when patient unattended         Problem: Hypertension  Goal: *Fluid volume balance  Outcome: Progressing Towards Goal

## 2022-03-29 NOTE — PROGRESS NOTES
Problem: Falls - Risk of  Goal: *Absence of Falls  Description: Document Patricia Marking Fall Risk and appropriate interventions in the flowsheet.   3/29/2022 1352 by Danae Neal RN  Outcome: Resolved/Met  3/29/2022 1234 by Danea Neal RN  Outcome: Progressing Towards Goal  Note: Fall Risk Interventions:  Mobility Interventions: Communicate number of staff needed for ambulation/transfer,Patient to call before getting OOB,PT Consult for mobility concerns    Mentation Interventions: Adequate sleep, hydration, pain control,Door open when patient unattended,Evaluate medications/consider consulting pharmacy    Medication Interventions: Evaluate medications/consider consulting pharmacy,Patient to call before getting OOB    Elimination Interventions: Call light in reach,Patient to call for help with toileting needs    History of Falls Interventions: Consult care management for discharge planning,Door open when patient unattended         Problem: Patient Education: Go to Patient Education Activity  Goal: Patient/Family Education  Outcome: Resolved/Met     Problem: Patient Education: Go to Patient Education Activity  Goal: Patient/Family Education  Outcome: Resolved/Met     Problem: Patient Education: Go to Patient Education Activity  Goal: Patient/Family Education  Outcome: Resolved/Met     Problem: Hypertension  Goal: *Blood pressure within specified parameters  Outcome: Resolved/Met  Goal: *Fluid volume balance  3/29/2022 1352 by Danae Neal RN  Outcome: Resolved/Met  3/29/2022 1234 by Danae Neal RN  Outcome: Progressing Towards Goal  Goal: *Labs within defined limits  Outcome: Resolved/Met     Problem: Patient Education: Go to Patient Education Activity  Goal: Patient/Family Education  Outcome: Resolved/Met

## 2022-03-29 NOTE — DISCHARGE SUMMARY
Discharge Summary       PATIENT ID: Diane Holland  MRN: [id number]   YOB: 1940    DATE OF ADMISSION: 3/26/2022 11:15 AM    DATE OF DISCHARGE: 03/29/22   PRIMARY CARE PROVIDER: Esperanza Elliott MD     ATTENDING PHYSICIAN: Heidi Frankel MD  DISCHARGING PROVIDER: Heidi Frankel MD    To contact this individual call [phone] and ask the  to page. If unavailable ask to be transferred the Adult Hospitalist Department. CONSULTATIONS: IP CONSULT TO CARDIOLOGY    PROCEDURES/SURGERIES: * No surgery found *    ADMITTING DIAGNOSES & HOSPITAL COURSE:   HPI: Dulce Stephenson is a 79 y. o. female with a pmhx past CVA, GERD, HTN, arthritis, and environmental allergies who presents with elevated BP, lightheadedness, and is being admitted for hypertensive urgency.  She has been seeing her PCP Dr. Juan Oropeza for blood pressure management. Aníbal Gilbert was taking losartan//12 .5 mg daily, bisoprolol 5 mg daily, amlodipine 10 mg daily, and hydralazine 50 mg 3 times daily.  She states that when she started taking the hydralazine, she started to develop a burning sensation in her throat, and ear so she decreased the frequency from 3 times a day to once daily.  She returned to her PCP on March 8, 2022, and at that time hydralazine was switched to metoprolol succinate 50 mg daily, and losartan 100 mg over hydrochlorothiazide 12.5 mg was changed to valsartan 320/12.5mg daily.  She subsequently began to develop lightheadedness.  Her daughter checked her BP and found it to be elevated so brought her to the ED for further evaluation.  She denies nausea, vomiting, vision, change, chest pain, shortness of breath or other focal deficit.     In the ED, BP was elevated to 200/105.  Other vitals were stable.  Creatinine was mildly elevated to 1.1 (b/l 0.7-0.9).   UA significant for trace protein, small blood, moderate leukocyte esterase, 1+ bacteria, 10-20 WBCs, and many epithelial cells.  CXR c/w pulmonary vascular congestion. CT head with chronic left cerebellar infarct, and chronic small vessel ischemic changes.  EKG with inferolateral ST depression.  Troponin is negative.     In the ED, she received 10 mg IV labetalol times two doses. \"    Patient managed for hypertensive urgency status post IV labetalol continue on Norvasc, losartan and chlorthalidone. Patient developed 3-second sinus pauses after administration of metoprolol which was held thereafter. Evaluated by cardiology after 48-hour period of washout no further evidence of pauses on tele. Stable for d/c from cardiology standpt. TTE was done and was within normal limit. Instructed patient daughter over the phone a Norvasc, losartan, chlorthalidone at current dosages. Follow-up with PCP, cardiology outpatient. PT eval patient > recommend HH. D/c with HH today. DISCHARGE DIAGNOSES / PLAN:      #Hypertensive urgency -resolved   #abnormal EKG  Patient presented due to elevated blood pressures, lightheadedness.  Initial blood pressure 200/105 with inferior lateral EKG changes.  Endorses compliance with home medications. Status post labetalol 10 mg IV x2 doses in the ED with minimal improvement in blood pressure.    On March 8, 2022, her hydralazine was stopped due to intolerance, her bisoprolol was changed to metoprolol 50 daily, and her losartan 100 mg, and hydrochlorothiazide 12.5 mg was changed to valsartan//12 .5 mg daily  continue Norvasc 10 mg daily, bb on hold due to bradycardia and sinus pause noted 3/27  .  c/w [id number], and chlorthalidone 25mg.  After 48 hr period of washout no further sinus pauses, stable for d/c from cardiology standpt   -TTE show normal EF       Sinus bradycardia and sinus pause  -noted yesterday on tele, patient asymptomatic lasted about 3 seconds of note patient was on beta-blocker 50 mg twice daily which was held yesterday after sinus pause occurred 3/27 around noon was last dose  -Appreciate cardiology recs will allow washout for 48 hours, if evidence of further pauses will need ep to eval   -Normal EF on TTE      #Previous stroke  CT head with evidence of chronic left cerebellar infarct  -pt. C/o difficulties with balance probably combination of stroke and b/l knee replacement  -continue ASA 81mg  -check lipid panel, and HgbA1c  -echo as per above  -PT/OT > recommend HH      #B/L LE edema R>L  -b/l venous duplex     #GERD  Continue home Pepcid     #Environmental allergies  Continue home Singulair, Zyrtec, and azelastine     #Nightmares  -continue prazosin Qhs     Code status: Full   DVT prophylaxis: lovenox subq     Called Tanya (child) for update     Care Plan discussed with: Patient/Family and Nurse  Anticipated Disposition: [facility] [place] [facility]  Anticipated Discharge: 24 hours to 48 hours           FOLLOW UP APPOINTMENTS:    Follow-up Information     Follow up With Specialties Details Why [address]     skilled nursing assessment along with review of medications. THEIR PHONE [phone]    Marvin Moseley MD Cardiology In 1 week office will call with appointment [address]  [address] [facility]      Bryan Sullivan MD Internal Medicine, [address] [facility] In [facility] 99 days  [address] [address]  [phone]      Bryan Sullivan MD Internal Medicine, [address] [place]             ADDITIONAL CARE RECOMMENDATIONS: rpt cbc and bmp 3-5 days, monitor bp at home if sbp reaches greater than 160 please give pcp call for bp med adjustments. DIET: Resume previous diet    ACTIVITY: Activity as tolerated        DISCHARGE MEDICATIONS:  Current Discharge Medication List      START taking these medications    Details   amLODIPine (NORVASC) 10 mg tablet Take 1 Tablet by mouth daily for 30 days.   Qty: 30 Tablet, Refills: 0  Start date: 3/30/2022, End date: 4/29/2022      chlorthalidone (HYGROTON) 25 mg tablet Take 1 Tablet by mouth daily for 30 days. Qty: 30 Tablet, Refills: 0  Start date: 3/30/2022, End date: 4/29/2022      losartan (COZAAR) 100 mg tablet Take 1 Tablet by mouth daily for 30 days. Qty: 30 Tablet, Refills: 0  Start date: 3/30/2022, End date: 4/29/2022      spironolactone (ALDACTONE) 25 mg tablet Take 1 Tablet by mouth daily for 30 days. Qty: 30 Tablet, Refills: 0  Start date: 3/30/2022, End date: 4/29/2022         CONTINUE these medications which have NOT CHANGED    Details   prazosin (MINIPRESS) 1 mg capsule Take 1 Capsule by mouth nightly for 90 days. Qty: 90 Capsule, Refills: 0    Associated Diagnoses: Nightmares      cholecalciferol (VITAMIN D3) (2,000 UNITS /50 MCG) cap capsule Take 1 Capsule by mouth daily for 90 days. Qty: 30 Capsule, Refills: 2    Associated Diagnoses: Vitamin D deficiency      famotidine (PEPCID) 40 mg tablet TAKE 1 TABLET BY MOUTH DAILY  Qty: 90 Tablet, Refills: 0    Associated Diagnoses: Gastroesophageal reflux disease without esophagitis      montelukast (SINGULAIR) 10 mg tablet TAKE 1 TABLET BY MOUTH EVERY DAY  Qty: 90 Tablet, Refills: 0    Associated Diagnoses: Environmental and seasonal allergies      cetirizine (ZYRTEC) 10 mg tablet Take 1 Tablet by mouth daily. azelastine (ASTELIN) 137 mcg (0.1 %) nasal spray USE 1 SPRAY IN EACH NOSTRIL TWICE DAILY AS DIRECTED  Qty: 1 Bottle, Refills: 2    Associated Diagnoses: Environmental and seasonal allergies      ALPRAZolam (XANAX) 0.5 mg tablet TAKE 1 TABLET BY MOUTH EVERY NIGHT FOR UP TO 10 DAYS AS NEEDED FOR ANXIETY. MAX DAILY AMOUNT: 0.5 MG  Qty: 10 Tab, Refills: 0    Associated Diagnoses: Anxiety attack      carbamide peroxide (DEBROX) 6.5 % otic solution Administer 5 Drops into each ear two (2) times a day. Qty: 7.5 mL, Refills: 0    Associated Diagnoses: Left otitis media, unspecified otitis media type; Left ear impacted cerumen      aspirin 81 mg chewable tablet Take 81 mg by mouth daily.          STOP taking these medications       metoprolol succinate (TOPROL-XL) 50 mg XL tablet Comments:   Reason for Stopping:         valsartan-hydroCHLOROthiazide (DIOVAN-HCT) 320-12.5 mg per tablet Comments:   Reason for Stopping:                 NOTIFY YOUR PHYSICIAN FOR ANY OF THE FOLLOWING:   Fever over 101 degrees for 24 hours. Chest pain, shortness of breath, fever, chills, nausea, vomiting, diarrhea, change in mentation, falling, weakness, bleeding. Severe pain or pain not relieved by medications. Or, any other signs or symptoms that you may have questions about. DISPOSITION:    Home With:   OT  PT x HH  RN       Long term SNF/Inpatient Rehab    Independent/assisted living    Hospice    Other:       PATIENT CONDITION AT DISCHARGE:     Functional status    Poor     Deconditioned    x Independent      Cognition    x Lucid     Forgetful     Dementia        Code status    x Full code     DNR      PHYSICAL EXAMINATION AT DISCHARGE:  I had a face to face encounter with this patient and independently examined them on 3/29/2022 as outlined below:                                                   Constitutional:  No acute distress, cooperative, pleasant, obese   ENT:  Oral mucosa moist, oropharynx benign. Resp:  CTA bilaterally. No wheezing/rhonchi/rales. No accessory muscle use   CV:  Regular rhythm, normal rate, no murmurs, gallops, rubs    GI:  Soft, non distended, non tender. normoactive bowel sounds, no hepatosplenomegaly     Musculoskeletal:  No edema, warm, 2+ pulses throughout    Neurologic:  Moves all extremities.   AAOx3, CN II-XII reviewed                                 CHRONIC MEDICAL DIAGNOSES:  Problem List as of 3/29/2022 Date Reviewed: 3/8/2022          Codes Class Noted - Resolved    Accelerated hypertension ICD-10-CM: I10  ICD-9-CM: 401.0  3/26/2022 - Present        Gastroesophageal reflux disease without esophagitis ICD-10-CM: K21.9  ICD-9-CM: 530.81  8/4/2015 - Present        Anxiety disorder ICD-10-CM: F41.9  ICD-9-CM: 300.00  8/4/2015 - Present        OA (osteoarthritis) ICD-10-CM: M19.90  ICD-9-CM: 715.90  4/15/2013 - Present        HTN (hypertension) ICD-10-CM: I10  ICD-9-CM: 401.9  4/15/2013 - Present        Allergic rhinitis ICD-10-CM: J30.9  ICD-9-CM: 477.9  4/15/2013 - Present        RESOLVED: Anxiety ICD-10-CM: F41.9  ICD-9-CM: 300.00  4/15/2013 - 2/15/2019        RESOLVED: Systolic murmur LZI-49-LL: R01.1  ICD-9-CM: 785.2  4/15/2013 - 3/20/2019              Greater than 30 minutes were spent with the patient on counseling and coordination of care    Signed:   Heidi Frankel MD  3/29/2022  1:22 PM

## 2022-03-29 NOTE — PROGRESS NOTES
Problem: Falls - Risk of  Goal: *Absence of Falls  Description: Document Jeane Hawley Fall Risk and appropriate interventions in the flowsheet.   Outcome: Progressing Towards Goal  Note: Fall Risk Interventions:  Mobility Interventions: Bed/chair exit alarm,Patient to call before getting OOB,PT Consult for mobility concerns    Mentation Interventions: Adequate sleep, hydration, pain control,Bed/chair exit alarm,Door open when patient unattended,Evaluate medications/consider consulting pharmacy,More frequent rounding,Increase mobility,Toileting rounds,Update white board    Medication Interventions: Bed/chair exit alarm,Evaluate medications/consider consulting pharmacy,Patient to call before getting OOB,Teach patient to arise slowly    Elimination Interventions: Bed/chair exit alarm,Call light in reach,Patient to call for help with toileting needs,Stay With Me (per policy),Toilet paper/wipes in reach,Toileting schedule/hourly rounds    History of Falls Interventions: Bed/chair exit alarm,Consult care management for discharge planning,Door open when patient unattended,Evaluate medications/consider consulting pharmacy,Investigate reason for fall         Problem: Hypertension  Goal: *Blood pressure within specified parameters  Outcome: Progressing Towards Goal  Goal: *Fluid volume balance  Outcome: Progressing Towards Goal  Goal: *Labs within defined limits  Outcome: Progressing Towards Goal

## 2022-03-29 NOTE — PROGRESS NOTES
Bedside shift change report given to Debbie Sheldon RN (oncoming nurse) by Myrna Giraldo RN (offgoing nurse). Report included the following information SBAR and Cardiac Rhythm sinus juan alberto.

## 2022-03-30 ENCOUNTER — TELEPHONE (OUTPATIENT)
Dept: PRIMARY CARE CLINIC | Age: 82
End: 2022-03-30

## 2022-03-30 ENCOUNTER — PATIENT OUTREACH (OUTPATIENT)
Dept: CASE MANAGEMENT | Age: 82
End: 2022-03-30

## 2022-03-30 ENCOUNTER — TELEPHONE (OUTPATIENT)
Dept: CARDIOLOGY CLINIC | Age: 82
End: 2022-03-30

## 2022-03-30 RX ORDER — SPIRONOLACTONE 25 MG/1
50 TABLET ORAL DAILY
Qty: 180 TABLET | Refills: 2
Start: 2022-03-30 | End: 2022-04-14 | Stop reason: SDUPTHER

## 2022-03-30 NOTE — TELEPHONE ENCOUNTER
----- Message from Jenny Chao sent at 3/29/2022  4:45 PM EDT -----  Subject: Appointment Request    Reason for Call: Routine Hospital Follow Up    QUESTIONS  Type of Appointment? Established Patient  Reason for appointment request? Available appointments did not meet   patient need  Additional Information for Provider? Patient needs a hospital follow up   appt within the next 3 days. Was seen at [facility] for high blood   pressure and discharged today (3/29). Please advise sooner availability  ---------------------------------------------------------------------------  --------------  CALL BACK INFO  What is the best way for the office to contact you? OK to leave message on   voicemail  Preferred Call Back Phone Number? [phone]  ---------------------------------------------------------------------------  --------------  SCRIPT ANSWERS  Relationship to Patient? Other  Representative Name? Tanya  Additional information verified (besides Name and Date of Birth)? Address  (Patient requests to see provider urgently. )? No  (Has the patient been discharged from the hospital within 2 business days   AND does not have a Telephone Encounter  Follow Up From [address] [place] [address]   documented in [address]?)? No  Do you have any questions for your primary care provider that need to be   answered prior to your appointment? (Use RN Triage if question pertains to   anything on the red flag list)? No  (Patient needs follow up visit after hospital discharge) Book first   available appointment within 7 days OF DISCHARGE, if no appt, proceed to   book the next available time slot within 14 days OF DISCHARGE AND Send   Message to Provider. [facility] Follow Up appointment cannot be booked   beyond 14 Days and should result in a Message to Provider. ? Yes   Have you been diagnosed with, awaiting test results for, or told that you   are suspected of having COVID-19 (Coronavirus)?  (If patient has tested   negative or was tested as a requirement for work, school, or travel and   not based on symptoms, answer no)? No  Within the past 10 days have you developed any of the following symptoms   (answer no if symptoms have been present longer than 10 days or began   more than 10 days ago)? Fever or Chills, Cough, Shortness of breath or   difficulty breathing, Loss of taste or smell, Sore throat, Nasal   congestion, Sneezing or runny nose, Fatigue or generalized body aches   (answer no if pain is specific to a body part e.g. back pain), Diarrhea,   Headache? No  Have you had close contact with someone with COVID-19 in the last 7 days? No  (Service Expert  click yes below to proceed with [place] [employer]ual   Scheduling)?  Yes

## 2022-03-30 NOTE — TELEPHONE ENCOUNTER
Verified patient with two types of identifiers. Patient's daughters report her BP was 185/90, 195/102 and then while speaking with me 185/94 with HR in the 60's. Per VO by Dr. Oumar Owens patient's daughter Marah Kohli will increase her mother's spironolactone to 50mg daily and monitor her BP. Confirmed follow up next week. Patient's daughter verbalized understanding and will call with any other questions.      Future Appointments   Date Time Provider Catia Mcallister   4/1/2022  2:45 PM Humphrey Perez MD [facility] BS AMB   4/8/2022  1:40 PM Eleanor Castro NP [facility] BS AMB

## 2022-03-30 NOTE — PROGRESS NOTES
Care Transitions Initial Call    Call within 2 business days of discharge: Yes     Patient: Ann Marie De La Rosa Patient : 1940 MRN: [id number]    Last Discharge [address]       Complaint Diagnosis Description Type Department Provider    3/26/22 Hypertension Uncontrolled hypertension . .. ED to Hosp-Admission (Discharged) (ADMIT) Gage Mabry MD; German Espinoza. .. Was this an external facility discharge? No Discharge Facility: [facility]    Challenges to be reviewed by the provider   Additional needs identified to be addressed with provider: no       Method of communication with provider : none    Discussed COVID-19 related testing which was not done at this time. Advance Care Planning:   Does patient have an Advance Directive:  Not on file. Will discuss with patient next call. Inpatient Readmission Risk score: Unplanned Readmit Risk Score: 6 ( )    Was this a readmission? no   Patient stated reason for the admission: \" blood pressure was very high    Patients top risk factors for readmission: none noted   Interventions to address risk factors: Obtained and reviewed discharge summary and/or continuity of care documents    Care Transition Nurse (CTN) contacted the patient and daughters by telephone to perform post hospital discharge assessment. Verified name and  with patient and daughters as identifiers. Provided introduction to self, and explanation of the CTN role. CTN reviewed discharge instructions, medical action plan and red flags with patient and daughters who verbalized understanding. Were discharge instructions available to patient? yes. Reviewed appropriate site of care based on symptoms and resources available to patient including: PCP and Specialist. patient and daughters given an opportunity to ask questions and does not have any further questions or concerns at this time.  The patient and daughters agrees to contact the PCP office for questions related to their healthcare. Medication reconciliation was performed with family, who verbalizes understanding of administration of home medications. Advised obtaining a 90-day supply of all daily and as-needed medications. Referral to Pharm D needed: no     Home Health/Outpatient orders at discharge: home health care, PT, OT and [address]: [facility]   Date of initial visit: TBA    Durable Medical Equipment ordered at discharge: None    Covid Risk Education    Educated patient about risk for severe COVID-19 due to risk factors according to CDC guidelines. CTN reviewed discharge instructions, medical action plan and red flag symptoms with the patient who verbalized understanding. Discussed COVID vaccination status: yes. Education provided on COVID-19 vaccination as appropriate. Discussed exposure protocols and quarantine with CDC Guidelines. Patient was given an opportunity to verbalize any questions and concerns and agrees to contact CTN or health care provider for questions related to their healthcare. Was patient discharged with a pulse oximeter? no.     Discussed follow-up appointments. If no appointment was previously scheduled, appointment scheduling offered: yes. Is follow up appointment scheduled within 7 days of discharge? no.   [facility] follow up appointment(s):   Future Appointments   Date Time Provider Catia Mcallister   4/8/2022  1:40 PM [facility]     Non[facility] follow up appointment(s): none    Plan for follow-up call in 5-7 days based on severity of symptoms and risk factors. Plan for next call: Blood pressure readings, follow up appointments, ACP  CTN provided contact information for future needs. Goals Addressed                 This Visit's Progress     ACP        3/30/2022  -No ACP on file. Did not discuss with family. Will discuss with patient next call. -SP       Attends follow-up appointments as directed.         3/30/2022  -will follow up with Cardiology 4/8/2022  -Daughter will call PCP for follow up appointment  -CTN to follow up in 1 week  SP       Prevent complications post hospitalization. 3/30/2022  -Will monitor BP and heart rate daily and record. Will notify provider with BP >180/100 or <80/40. Will notify provider with HR <50.   -CTN to follow up in 1 week  SP

## 2022-03-31 ENCOUNTER — PATIENT OUTREACH (OUTPATIENT)
Dept: CASE MANAGEMENT | Age: 82
End: 2022-03-31

## 2022-03-31 ENCOUNTER — TELEPHONE (OUTPATIENT)
Dept: CARDIOLOGY CLINIC | Age: 82
End: 2022-03-31

## 2022-03-31 NOTE — PROGRESS NOTES
Care Transitions Follow Up Call    Challenges to be reviewed by the provider   Additional needs identified to be addressed with provider: yes  -Elevated BPs  3/31/2022 -BP 180s/90s  2022-BP 160s/90s           Method of communication with provider : chart routing and phone    Care Transition Nurse (CTN) contacted the family by telephone to follow up after admission on 3/26/2022. Verified name and  with family as identifiers. Addressed changes since last contact: none  Follow up appointment completed? Scheduled for today 2022. CTN reviewed discharge instructions, medical action plan and red flags with family and discussed any barriers to care and/or understanding of plan of care after discharge. Discussed appropriate site of care based on symptoms and resources available to patient including: PCP, Specialist and When to call 911. The family agrees to contact the PCP office for questions related to their healthcare. Patients top risk factors for readmission: none noted   Interventions to address risk factors: Obtained and reviewed discharge summary and/or continuity of care documents    [facility] follow up appointment(s):   Future Appointments   Date Time Provider Catia Mcallister   2022  2:45 PM Hali Ghosh MD [facility] BS AMB   2022  1:40 PM Saray Castro NP [facility] BS AMB     Non-[facility] follow up appointment(s): none    CTN provided contact information for future needs. Plan for follow-up call in 3-5 days based on severity of symptoms and risk factors. Plan for next call: BP and PCP follow up      Goals Addressed                 This Visit's Progress     Prevent complications post hospitalization. On track     2022  -Court Miller, reports that the Cardiology office did not contact her. Today her mother's BP is 160/93. Will continue to monitor. Will attend PCP follow up today.   -CTN to follow up in 5 days  SP  3/31/2022  -Daughter called CTN to report elevated BP of 184/91 despite Cardiology increasing spironolactone to 50mg daily the day before. CTN called and left message with Cardiology office. -CTN to follow up in 1 day  SP  3/30/2022  -Will monitor BP and heart rate daily and record. Will notify provider with BP >180/100 or <80/40. Will notify provider with HR <50.   -CTN to follow up in 1 week  SP

## 2022-03-31 NOTE — TELEPHONE ENCOUNTER
Yamilet, Nurse Navigator for [address] [place] [address] called to report the patient's blood pressure this morning, patient's bp was 184-/91 after taking medication, Yamilet stated she was aware the nurse spoke with the patient and adjustments to her meds were done and the patient followed the recommendations, please contact patient's daughter to discuss options    Prabha Colon (clarisa)  [phone]        Hillary/Nurse Navigator/Justin Baez  [phone]

## 2022-04-01 ENCOUNTER — OFFICE VISIT (OUTPATIENT)
Dept: PRIMARY CARE CLINIC | Age: 82
End: 2022-04-01
Payer: MEDICARE

## 2022-04-01 VITALS
SYSTOLIC BLOOD PRESSURE: 128 MMHG | RESPIRATION RATE: 16 BRPM | TEMPERATURE: 98.4 F | HEART RATE: 105 BPM | HEIGHT: 70 IN | OXYGEN SATURATION: 98 % | DIASTOLIC BLOOD PRESSURE: 78 MMHG | BODY MASS INDEX: 29.2 KG/M2 | WEIGHT: 204 LBS

## 2022-04-01 DIAGNOSIS — F41.9 ANXIETY: ICD-10-CM

## 2022-04-01 DIAGNOSIS — K21.9 GASTROESOPHAGEAL REFLUX DISEASE WITHOUT ESOPHAGITIS: ICD-10-CM

## 2022-04-01 DIAGNOSIS — H61.22 IMPACTED CERUMEN OF LEFT EAR: ICD-10-CM

## 2022-04-01 DIAGNOSIS — I10 ACCELERATED HYPERTENSION: Primary | ICD-10-CM

## 2022-04-01 DIAGNOSIS — F41.0 ANXIETY ATTACK: ICD-10-CM

## 2022-04-01 PROCEDURE — G8427 DOCREV CUR MEDS BY ELIG CLIN: HCPCS | Performed by: INTERNAL MEDICINE

## 2022-04-01 PROCEDURE — 69210 REMOVE IMPACTED EAR WAX UNI: CPT | Performed by: INTERNAL MEDICINE

## 2022-04-01 PROCEDURE — 99496 TRANSJ CARE MGMT HIGH F2F 7D: CPT | Performed by: INTERNAL MEDICINE

## 2022-04-01 RX ORDER — ALPRAZOLAM 0.5 MG/1
0.5 TABLET ORAL
Qty: 10 TABLET | Refills: 0 | Status: SHIPPED | OUTPATIENT
Start: 2022-04-01 | End: 2022-04-11

## 2022-04-01 NOTE — PROGRESS NOTES
Janny Carrero (: 1940) is a 79 y.o. female, established patient, here for evaluation of the following chief complaint(s):  Hospital Follow Up ([facility] hospital- discharged 22- pt would like a cane to help with walking-), Ear Pain (left ear- pt says that feels like something is it- ), and Medication Refill (would like a rx for xanax)     Written by Surendra Umaña, as dictated by Dr. Christoph Melendrez MD.      ASSESSMENT/PLAN:  Below is the assessment and plan developed based on review of pertinent history, physical exam, labs, studies, and medications. 1. Accelerated hypertension  Reviewed hospital notes. Continue on amlodipine 10 mg daily, chlorthalidone 25 mg daily, losartan 100 mg daily, and spironolactone 25 mg daily. She has a follow-up with Marita Rajput NP (cardiology) on 22. 2. Anxiety  I refilled her Xanax 0.5 mg PRN. If her anxiety is not well controlled and she is having to take Xanax frequently we will start her on a daily medication.   -     ALPRAZolam (XANAX) 0.5 mg tablet; Take 1 Tablet by mouth nightly as needed for Anxiety for up to 10 days. Max Daily Amount: 0.5 mg., Normal, Disp-10 Tablet, R-0 sent to pharmacy. 3. Gastroesophageal reflux disease without esophagitis  Well controlled on famotidine 40 mg. Continue on current medication(s). 4. Impacted cerumen of left ear  Impacted cerumen in left ear removed in office. She tolerated this well. -     REMOVE IMPACTED EAR WAX    5. Anxiety attack  I refilled her Xanax 0.5 mg PRN. SUBJECTIVE/OBJECTIVE:  HPI   Ms. Thee Lazcano is a 79y.o. year old female, she is seen today for Transition of Care services following a hospital discharge for uncontrolled HTN on 22. She is here today with her daughter who helps provide medical hx. The Nurse Navigator performed an outreach to Ms. Ajith Brewer on 22 (within 2 business days of discharge) to complete medication reconciliation and a telephonic assessment of her condition. She originally presented due to elevated BP and lightheadedness. She was treated with IV labetalol in the ED. She was discharged on amlodipine 10 mg daily, chlorthalidone 25 mg daily, losartan 100 mg daily, and spironolactone 25 mg daily for HTN. Her metoprolol was stopped due to low HR. Today, her BP is good at 127/81, 128/78 on manual repeat. She has been checking her BP at home with some elevated readings. She denies any headaches. She is c/o left ear pain and states she feels like something is in it. She is on famotidine 40 mg for GERD. States that she did not have any problems with reflux in the hospital since she was eating better food and not drinking sodas. She is requesting a medication for anxiety. She was previously on Xanax 0.5 mg PRN. She was started on prazosin at her last follow-up for nightmares. She has not had any recent nightmares. Patient Active Problem List   Diagnosis Code    OA (osteoarthritis) M19.90    HTN (hypertension) I10    Allergic rhinitis J30.9    Gastroesophageal reflux disease without esophagitis K21.9    Anxiety disorder F41.9    Accelerated hypertension I10        Current Outpatient Medications on File Prior to Visit   Medication Sig Dispense Refill    spironolactone (ALDACTONE) 25 mg tablet Take 2 Tablets by mouth daily. 180 Tablet 2    amLODIPine (NORVASC) 10 mg tablet Take 1 Tablet by mouth daily for 30 days. 30 Tablet 0    chlorthalidone (HYGROTON) 25 mg tablet Take 1 Tablet by mouth daily for 30 days. 30 Tablet 0    losartan (COZAAR) 100 mg tablet Take 1 Tablet by mouth daily for 30 days. 30 Tablet 0    prazosin (MINIPRESS) 1 mg capsule Take 1 Capsule by mouth nightly for 90 days. 90 Capsule 0    cholecalciferol (VITAMIN D3) (2,000 UNITS /50 MCG) cap capsule Take 1 Capsule by mouth daily for 90 days.  30 Capsule 2    famotidine (PEPCID) 40 mg tablet TAKE 1 TABLET BY MOUTH DAILY 90 Tablet 0    montelukast (SINGULAIR) 10 mg tablet TAKE 1 TABLET BY MOUTH EVERY DAY 90 Tablet 0    cetirizine (ZYRTEC) 10 mg tablet Take 1 Tablet by mouth daily.  azelastine (ASTELIN) 137 mcg (0.1 %) nasal spray USE 1 SPRAY IN EACH NOSTRIL TWICE DAILY AS DIRECTED 1 Bottle 2    aspirin 81 mg chewable tablet Take 81 mg by mouth daily.  ALPRAZolam (XANAX) 0.5 mg tablet TAKE 1 TABLET BY MOUTH EVERY NIGHT FOR UP TO 10 DAYS AS NEEDED FOR ANXIETY. MAX DAILY AMOUNT: 0.5 MG 10 Tab 0    [DISCONTINUED] carbamide peroxide (DEBROX) 6.5 % otic solution Administer 5 Drops into each ear two (2) times a day. (Patient not taking: Reported on 2022) 7.5 mL 0     No current facility-administered medications on file prior to visit.        Allergies   Allergen Reactions    Hydralazine Unknown (comments)    Other Plant, Animal, Environmental Runny Nose       Past Medical History:   Diagnosis Date    Anxiety     Arthritis     GERD (gastroesophageal reflux disease)     Hypertension     Psychiatric disorder     anxiety d/o       Past Surgical History:   Procedure Laterality Date    HX GYN      hysterectomy    HX GYN      hysterectomy     HX ORTHOPAEDIC      total left knee    HX ORTHOPAEDIC      knee replacment 2007 left, 2014 right       Family History   Problem Relation Age of Onset    Stroke Maternal Grandmother     Hypertension Mother     Stroke Mother     Hypertension Brother     Stroke Brother        Social History     Socioeconomic History    Marital status:      Spouse name: Not on file    Number of children: Not on file    Years of education: Not on file    Highest education level: Not on file   Occupational History    Not on file   Tobacco Use    Smoking status: Former Smoker     Years: 8.00     Quit date: 1990     Years since quittin.2    Smokeless tobacco: Never Used   Substance and Sexual Activity    Alcohol use: No    Drug use: No    Sexual activity: Not Currently   Other Topics Concern    Not on file Social History Narrative    ** Merged History Encounter **            No results displayed because visit has over 200 results. Office Visit on 03/08/2022   Component Date Value Ref Range Status    WBC 03/08/2022 7.0  3.6 - 11.0 K/uL Final    RBC 03/08/2022 4.44  3.80 - 5.20 M/uL Final    HGB 03/08/2022 13.4  11.5 - 16.0 g/dL Final    HCT 03/08/2022 42.6  35.0 - 47.0 % Final    MCV 03/08/2022 95.9  80.0 - 99.0 FL Final    MCH 03/08/2022 30.2  26.0 - 34.0 PG Final    MCHC 03/08/2022 31.5  30.0 - 36.5 g/dL Final    RDW 03/08/2022 12.8  11.5 - 14.5 % Final    PLATELET 44/80/1059 245  150 - 400 K/uL Final    MPV 03/08/2022 11.9  8.9 - 12.9 FL Final    NRBC 03/08/2022 0.0  0  WBC Final    ABSOLUTE NRBC 03/08/2022 0.00  0.00 - 0.01 K/uL Final    Sodium 03/08/2022 136  136 - 145 mmol/L Final    Potassium 03/08/2022 4.1  3.5 - 5.1 mmol/L Final    Chloride 03/08/2022 100  97 - 108 mmol/L Final    CO2 03/08/2022 31  21 - 32 mmol/L Final    Anion gap 03/08/2022 5  5 - 15 mmol/L Final    Glucose 03/08/2022 94  65 - 100 mg/dL Final    BUN 03/08/2022 13  6 - 20 MG/DL Final    Creatinine 03/08/2022 0.98  0.55 - 1.02 MG/DL Final    BUN/Creatinine ratio 03/08/2022 13  12 - 20   Final    GFR est AA 03/08/2022 >60  >60 ml/min/1.73m2 Final    GFR est non-AA 03/08/2022 54* >60 ml/min/1.73m2 Final    Comment: Estimated GFR is calculated using the IDMS-traceable Modification of Diet in  Renal Disease (MDRD) Study equation, reported for both  Americans  (GFRAA) and non- Americans (GFRNA), and normalized to 1.73m2 body  surface area. The physician must decide which value applies to the patient.  Calcium 03/08/2022 9.7  8.5 - 10.1 MG/DL Final    Bilirubin, total 03/08/2022 0.7  0.2 - 1.0 MG/DL Final    ALT (SGPT) 03/08/2022 21  12 - 78 U/L Final    AST (SGOT) 03/08/2022 23  15 - 37 U/L Final    Alk.  phosphatase 03/08/2022 78  45 - 117 U/L Final    Protein, total 03/08/2022 7.6  6.4 - 8.2 g/dL Final    Albumin 03/08/2022 4.3  3.5 - 5.0 g/dL Final    Globulin 03/08/2022 3.3  2.0 - 4.0 g/dL Final    A-G Ratio 03/08/2022 1.3  1.1 - 2.2   Final    SAMPLES BEING HELD 03/08/2022 1SST   Final    COMMENT 03/08/2022 Add-on orders for these samples will be processed based on acceptable specimen integrity and analyte stability, which may vary by analyte. Final      Review of Systems   Constitutional: Negative for activity change, fatigue and unexpected weight change. HENT: Negative for congestion, hearing loss, rhinorrhea and sore throat. Eyes: Negative for discharge. Respiratory: Negative for cough, chest tightness and shortness of breath. Cardiovascular: Negative for leg swelling. Gastrointestinal: Negative for abdominal pain, constipation and diarrhea. Genitourinary: Negative for dysuria, flank pain, frequency and urgency. Musculoskeletal: Negative for arthralgias, back pain and myalgias. Skin: Negative for color change and rash. Neurological: Negative for dizziness, light-headedness and headaches. Psychiatric/Behavioral: Negative for dysphoric mood and sleep disturbance. The patient is not nervous/anxious. Visit Vitals  /78 (BP 1 Location: Left arm, BP Patient Position: Sitting)   Pulse (!) 105   Temp 98.4 °F (36.9 °C) (Temporal)   Resp 16   Ht 5' 10\" (1.778 m)   Wt 204 lb (92.5 kg)   SpO2 98%   BMI 29.27 kg/m²      Physical Exam  Vitals and nursing note reviewed. Constitutional:       General: She is not in acute distress. Appearance: Normal appearance. She is well-developed. She is not diaphoretic. HENT:      Right Ear: External ear normal.      Left Ear: External ear normal. There is impacted cerumen. Eyes:      General: No scleral icterus. Right eye: No discharge. Left eye: No discharge. Extraocular Movements: Extraocular movements intact.       Conjunctiva/sclera: Conjunctivae normal.   Cardiovascular:      Rate and Rhythm: Normal rate and regular rhythm. Pulmonary:      Effort: Pulmonary effort is normal.      Breath sounds: Normal breath sounds. No wheezing. Abdominal:      General: Bowel sounds are normal.      Palpations: Abdomen is soft. Tenderness: There is no abdominal tenderness. Musculoskeletal:      Cervical back: Normal range of motion and neck supple. Lymphadenopathy:      Cervical: No cervical adenopathy. Neurological:      Mental Status: She is alert and oriented to person, place, and time. Psychiatric:         Mood and Affect: Mood and affect normal.       An electronic signature was used to authenticate this note.   -- Sidra Lora

## 2022-04-01 NOTE — PROGRESS NOTES
Chief Complaint   Patient presents with   [facility] Follow Up     [facility] hospital- discharged 03/26/22- pt would like a cane to help with walking-    Ear Pain     left ear- pt says that feels like something is it-     Medication Refill     would like a rx for xanax       There are no preventive care reminders to display for this patient. 1. Have you been to the ER, urgent care clinic since your last visit? Hospitalized since your last visit? Yes Reason for visit: [facility]- 03/26/22- for high bp    2. Have you seen or consulted any other health care providers outside of the [address] [place] [address] since your last visit? Include any pap smears or colon screening.  No    Visit Vitals  Wt 204 lb (92.5 kg)   BMI 29.27 kg/m²

## 2022-04-02 NOTE — TELEPHONE ENCOUNTER
Have to avoid beta-blocker because of pauses and that makes clonidine little tough to use also. Have patient start hydralazine 50 mg 3 times daily. If needed we may also need to start clonidine with close monitoring of heart rate. Patient is already on CCB, thiazide diuretic, ACE and aspirin lactone.   Future Appointments   Date Time Provider Catia Mcallister   4/8/2022  1:40 PM Mingo Castro NP [facility]

## 2022-04-06 NOTE — TELEPHONE ENCOUNTER
Bhargav Booker MD  You 17 hours ago (4:31 PM)     MG    Not ideal in a 80 yo but increase prazosin to 2 mg po bid   Warn him of potential orthostatic hypotension   What allergy to hydralazine anyway? Message text      TC to pt, ID verified. Pt states she does have allergy to Hydralazine, caused burning in her ears the last time she took it. Advised we had gotten a message that her blood pressure had been elevated. She states since the Spironolactone was increased to 2 tabs per day her BP has been great. She just saw Dr. Wilfrid Reno and it was 128/78, and states it has been like this at home and she is eeling well. Advised no changes in medications and she has appt with JOSE MIGUEL Castro Friday. No further questions. Advised her to call if she had any problems.

## 2022-04-08 ENCOUNTER — PATIENT OUTREACH (OUTPATIENT)
Dept: CASE MANAGEMENT | Age: 82
End: 2022-04-08

## 2022-04-08 ENCOUNTER — OFFICE VISIT (OUTPATIENT)
Dept: CARDIOLOGY CLINIC | Age: 82
End: 2022-04-08
Payer: MEDICARE

## 2022-04-08 VITALS
SYSTOLIC BLOOD PRESSURE: 150 MMHG | HEIGHT: 70 IN | WEIGHT: 207 LBS | OXYGEN SATURATION: 97 % | BODY MASS INDEX: 29.63 KG/M2 | HEART RATE: 72 BPM | RESPIRATION RATE: 16 BRPM | DIASTOLIC BLOOD PRESSURE: 80 MMHG

## 2022-04-08 DIAGNOSIS — I10 HTN (HYPERTENSION), BENIGN: Primary | ICD-10-CM

## 2022-04-08 DIAGNOSIS — R42 LIGHTHEADEDNESS: ICD-10-CM

## 2022-04-08 DIAGNOSIS — I45.5 SINUS PAUSE: ICD-10-CM

## 2022-04-08 DIAGNOSIS — F51.5 NIGHTMARES: ICD-10-CM

## 2022-04-08 DIAGNOSIS — R00.1 SINUS BRADYCARDIA: ICD-10-CM

## 2022-04-08 PROCEDURE — 99214 OFFICE O/P EST MOD 30 MIN: CPT | Performed by: NURSE PRACTITIONER

## 2022-04-08 PROCEDURE — 1111F DSCHRG MED/CURRENT MED MERGE: CPT | Performed by: NURSE PRACTITIONER

## 2022-04-08 PROCEDURE — G8754 DIAS BP LESS 90: HCPCS | Performed by: NURSE PRACTITIONER

## 2022-04-08 PROCEDURE — G8536 NO DOC ELDER MAL SCRN: HCPCS | Performed by: NURSE PRACTITIONER

## 2022-04-08 PROCEDURE — G0463 HOSPITAL OUTPT CLINIC VISIT: HCPCS | Performed by: NURSE PRACTITIONER

## 2022-04-08 PROCEDURE — G8753 SYS BP > OR = 140: HCPCS | Performed by: NURSE PRACTITIONER

## 2022-04-08 PROCEDURE — 1101F PT FALLS ASSESS-DOCD LE1/YR: CPT | Performed by: NURSE PRACTITIONER

## 2022-04-08 PROCEDURE — 1090F PRES/ABSN URINE INCON ASSESS: CPT | Performed by: NURSE PRACTITIONER

## 2022-04-08 PROCEDURE — G8427 DOCREV CUR MEDS BY ELIG CLIN: HCPCS | Performed by: NURSE PRACTITIONER

## 2022-04-08 PROCEDURE — G8399 PT W/DXA RESULTS DOCUMENT: HCPCS | Performed by: NURSE PRACTITIONER

## 2022-04-08 PROCEDURE — G8419 CALC BMI OUT NRM PARAM NOF/U: HCPCS | Performed by: NURSE PRACTITIONER

## 2022-04-08 PROCEDURE — G8432 DEP SCR NOT DOC, RNG: HCPCS | Performed by: NURSE PRACTITIONER

## 2022-04-08 RX ORDER — PRAZOSIN HYDROCHLORIDE 1 MG/1
1 CAPSULE ORAL 2 TIMES DAILY
Qty: 60 CAPSULE | Refills: 2 | Status: SHIPPED | OUTPATIENT
Start: 2022-04-08 | End: 2022-07-13

## 2022-04-08 RX ORDER — LOSARTAN POTASSIUM 100 MG/1
100 TABLET ORAL DAILY
Qty: 90 TABLET | Refills: 2 | Status: SHIPPED | OUTPATIENT
Start: 2022-04-08

## 2022-04-08 RX ORDER — CHLORTHALIDONE 25 MG/1
25 TABLET ORAL DAILY
Qty: 90 TABLET | Refills: 2 | Status: SHIPPED | OUTPATIENT
Start: 2022-04-08 | End: 2022-05-04

## 2022-04-08 RX ORDER — AMLODIPINE BESYLATE 10 MG/1
10 TABLET ORAL DAILY
Qty: 90 TABLET | Refills: 2 | Status: SHIPPED | OUTPATIENT
Start: 2022-04-08

## 2022-04-08 NOTE — PATIENT INSTRUCTIONS
Please limit your sodium intake to 2500mg daily     Please increase your prazosin to 1mg twice daily     When you have labs with Dr. Uriel Hutchinson please ask to have your kidney function and potassium level checked at that time

## 2022-04-08 NOTE — PROGRESS NOTES
HPI: Janny Carrero, a 79y.o. year-old who presents for evaluation of HTN and sinus pauses. She was hospitalized at [facility] for lightheadedness and HTN, discharged to home 3/29/22  PMH of HTN, anxiety, GERD  She demonstrated pauses of up to 3 seconds (sinus bradycardia) and Toprol XL was stopped (pt was on toprol XL 50 daily PTA). Pauses improved but heart rate remained in the 40-50 bpm range at discharge    Today her heart rate is 70 bpm, daughter says at home her heart rate is closer to 90 bpm but patient is asymptomatic  BP elevated here today, at home her BP fluctuates some but usually runs around -160mmHg  She is compliant with her medications, says she needs to be better about following a low sodium diet  No headaches or neurologic symptoms   No dizziness or lightheadedness  No chest pain  No dyspnea with exertion  Patient saw Dr. Irlanda Alvarez recently and plans to see her again at the end of this month, will have labs drawn at that visit as well    Assessment/Plan:  1. Pauses:  No further significant pauses noted on telemetry prior to discharge, patient remained bradycardic but that has resolved here today, will stay off AV jhonny blockers for now     2. Malignant HTN - off beta blocker as above, BP still slightly elevated, continue losartan 100mg daily, amlodipine 10mg daily, spironolactone 50mg daily and chlorthalidone 25mg daily  Advised her to increase prazosin to 1mg BID and continue to monitor home BP readings  Encouraged sodium restriction, echo 3/29/22 showed normal EF  Asked her to request Dr. Irlanda Alvarez check her renal function at her visit with her later this month   She will follow up with Dr. Arnie Cardenas in May     3. Dyslipidemia -at goal.3/27/22 LDL 53, HDL 61. No statin PTA. ECHO ADULT COMPLETE 03/29/2022 3/29/2022    Interpretation Summary    Left Ventricle: Left ventricle size is normal. Normal wall thickness. Normal wall motion.  Normal left ventricular systolic function with a visually estimated EF of 55 - 60%.   Mitral Valve: Mild transvalvular regurgitation.   Pulmonary Arteries: Mild pulmonary hypertension present. Signed by: Fahad Stone MD on 3/29/2022 12:40 PM      She  has a past medical history of Anxiety, Arthritis, GERD (gastroesophageal reflux disease), Hypertension, and Psychiatric disorder. Cardiovascular ROS: no chest pain or dyspnea on exertion  Respiratory ROS: no cough, shortness of breath, or wheezing  Neurological ROS: no TIA or stroke symptoms  All other systems negative except as above. PE  Vitals:    04/08/22 1344 04/08/22 1353 04/08/22 1536   BP: (!) 170/90 (!) 164/80 (!) 150/80   Pulse: 72     Resp: 16     SpO2: 97%     Weight: 207 lb (93.9 kg)     Height: 5' 10\" (1.778 m)      Body mass index is 29.7 kg/m².    General appearance - alert, well appearing, and in no distress  Mental status - affect appropriate to mood  Eyes - sclera anicteric, moist mucous membranes  Neck - supple  Lymphatics - not assessed  Chest - clear to auscultation, no wheezes, rales or rhonchi  Heart - normal rate, regular rhythm, normal S1, S2, no murmurs, rubs, clicks or gallops  Abdomen - soft, nontender, nondistended  Back exam - full range of motion, no tenderness  Neurological - cranial nerves II through XII grossly intact, no focal deficit  Musculoskeletal - no muscular tenderness noted, normal strength  Extremities - peripheral pulses normal, no pedal edema  Skin - normal coloration  no rashes      Recent Labs:  Lab Results   Component Value Date/Time    Cholesterol, total 126 03/27/2022 12:52 AM    HDL Cholesterol 61 03/27/2022 12:52 AM    LDL, calculated 53.4 03/27/2022 12:52 AM    Triglyceride 58 03/27/2022 12:52 AM    CHOL/HDL Ratio 2.1 03/27/2022 12:52 AM     Lab Results   Component Value Date/Time    Creatinine 0.90 03/29/2022 02:33 AM     Lab Results   Component Value Date/Time    BUN 16 03/29/2022 02:33 AM     Lab Results   Component Value Date/Time    Potassium 3.9 2022 02:33 AM     Lab Results   Component Value Date/Time    Hemoglobin A1c 5.2 2022 12:52 AM     Lab Results   Component Value Date/Time    HGB 12.4 2022 02:33 AM     Lab Results   Component Value Date/Time    PLATELET 963  02:33 AM       Reviewed:  Past Medical History:   Diagnosis Date    Anxiety     Arthritis     GERD (gastroesophageal reflux disease)     Hypertension     Psychiatric disorder     anxiety d/o     Social History     Tobacco Use   Smoking Status Former Smoker    Years: 8.00    Quit date: 1990    Years since quittin.2   Smokeless Tobacco Never Used     Social History     Substance and Sexual Activity   Alcohol Use No     Allergies   Allergen Reactions    Hydralazine Unknown (comments)    Other Plant, Animal, Environmental Runny Nose       Current Outpatient Medications   Medication Sig    prazosin (MINIPRESS) 1 mg capsule Take 1 Capsule by mouth two (2) times a day.  losartan (COZAAR) 100 mg tablet Take 1 Tablet by mouth daily.  chlorthalidone (HYGROTON) 25 mg tablet Take 1 Tablet by mouth daily.  amLODIPine (NORVASC) 10 mg tablet Take 1 Tablet by mouth daily.  ALPRAZolam (XANAX) 0.5 mg tablet Take 1 Tablet by mouth nightly as needed for Anxiety for up to 10 days. Max Daily Amount: 0.5 mg.    spironolactone (ALDACTONE) 25 mg tablet Take 2 Tablets by mouth daily.  cholecalciferol (VITAMIN D3) (2,000 UNITS /50 MCG) cap capsule Take 1 Capsule by mouth daily for 90 days.  famotidine (PEPCID) 40 mg tablet TAKE 1 TABLET BY MOUTH DAILY    montelukast (SINGULAIR) 10 mg tablet TAKE 1 TABLET BY MOUTH EVERY DAY    cetirizine (ZYRTEC) 10 mg tablet Take 1 Tablet by mouth daily.  azelastine (ASTELIN) 137 mcg (0.1 %) nasal spray USE 1 SPRAY IN EACH NOSTRIL TWICE DAILY AS DIRECTED    aspirin 81 mg chewable tablet Take 81 mg by mouth daily. No current facility-administered medications for this visit.        REBECA Bryant, [address] [facility]  [facility]  [address], [address] [facility] [address]  [address]  () [phone] (Z) [phone]

## 2022-04-08 NOTE — PROGRESS NOTES
Care Transitions Follow Up Call    Challenges to be reviewed by the provider   Additional needs identified to be addressed with provider: no         Method of communication with provider : none    Care Transition Nurse (CTN) contacted the patient by telephone to follow up after admission on 3/26/2022. Verified name and  with patient as identifiers. Addressed changes since last contact: none  Follow up appointment completed? yes. Was follow up appointment scheduled within 7 days of discharge? yes. Advance Care Planning:   Does patient have an Advance Directive:  not on file. CTN reviewed discharge instructions, medical action plan and red flags with patient and discussed any barriers to care and/or understanding of plan of care after discharge. Discussed appropriate site of care based on symptoms and resources available to patient including: PCP and Specialist. The patient agrees to contact the PCP office for questions related to their healthcare. Patients top risk factors for readmission: none noted   Interventions to address risk factors: Obtained and reviewed discharge summary and/or continuity of care documents    [address] follow up appointment(s):   Future Appointments   Date Time Provider Catia Mcallister   2022  1:40 PM David Castro NP [facility]     Non[facility] follow up appointment(s): none    CTN provided contact information for future needs. Plan for follow-up call in 5-7 days based on severity of symptoms and risk factors. Plan for next call: ACP, blood pressure readings and cardiology follow up      Goals Addressed                 This Visit's Progress     Attends follow-up appointments as directed.    On track     2022  -Attended PCP follow up on   -Will attend Cardiology follow up today 2022  -CTN to follow up in 1 week  SP  3/30/2022  -will follow up with Cardiology 2022  -Daughter will call PCP for follow up appointment  -CTN to follow up in 1 week  SP       Prevent complications post hospitalization. On track     4/8/2022  -Patient reports that her blood pressure has normalized. She did not have exact numbers , but said that the [facility] nurse told her it was in normal range. CTN educated patient on BP parameters. Will report BP >180/100 or <80/40  -CTN to follow up in 1 week  SP  4/01/2022  -Nelly Barrera, reports that the Cardiology office did not contact her. Today her mother's BP is 160/93. Will continue to monitor. Will attend PCP follow up today. -CTN to follow up in 5 days  SP  3/31/2022  -Daughter called CTN to report elevated BP of 184/91 despite Cardiology increasing spironolactone to 50mg daily the day before. CTN called and left message with Cardiology office. -CTN to follow up in 1 day  SP  3/30/2022  -Will monitor BP and heart rate daily and record. Will notify provider with BP >180/100 or <80/40. Will notify provider with HR <50.   -CTN to follow up in 1 week  SP

## 2022-04-11 RX ORDER — SPIRONOLACTONE 25 MG/1
TABLET ORAL
Qty: 90 TABLET | OUTPATIENT
Start: 2022-04-11

## 2022-04-14 RX ORDER — SPIRONOLACTONE 25 MG/1
50 TABLET ORAL DAILY
Qty: 180 TABLET | Refills: 0 | Status: SHIPPED | OUTPATIENT
Start: 2022-04-14 | End: 2022-07-08

## 2022-04-14 NOTE — TELEPHONE ENCOUNTER
Pharmacy requesting refill, patient states she is out of the med due to the incresase. Fiona Rios [facility] [place] store [phone]

## 2022-04-14 NOTE — TELEPHONE ENCOUNTER
Requested Prescriptions     Signed Prescriptions Disp Refills    spironolactone (ALDACTONE) 25 mg tablet 180 Tablet 0     Sig: Take 2 Tablets by mouth daily. Authorizing Provider: Judson Pack     Ordering User: Won Valdez     Refused Prescriptions Disp Refills    spironolactone (ALDACTONE) 25 mg tablet [Pharmacy Med Name: SPIRONOLACTONE 25MG TABLETS] 90 Tablet      Sig: TAKE 1 TABLET BY MOUTH DAILY     Refused By: Judson Pack     Reason for Refusal: Medication dose changed     Verbal order per Sulema Varma NP.     Future Appointments   Date Time Provider Catia Mcallister   5/4/2022 11:40 AM MD MADISYN Martin AMB

## 2022-04-19 ENCOUNTER — PATIENT OUTREACH (OUTPATIENT)
Dept: CASE MANAGEMENT | Age: 82
End: 2022-04-19

## 2022-04-21 NOTE — PROGRESS NOTES
Care Transitions Follow Up Call    Challenges to be reviewed by the provider   Additional needs identified to be addressed with provider: no           Method of communication with provider : none    Care Transition Nurse (CTN) contacted the patient by telephone to follow up after admission on 3/26/2022. Verified name and  with patient as identifiers. Addressed changes since last contact: none  Follow up appointment completed? yes. Was follow up appointment scheduled within 7 days of discharge? yes. Advance Care Planning:   Does patient have an Advance Directive:  currently not on file; patient declined education. See goals. CTN reviewed discharge instructions, medical action plan and red flags with patient and discussed any barriers to care and/or understanding of plan of care after discharge. Discussed appropriate site of care based on symptoms and resources available to patient including: PCP, Specialist, [facility] and When to call 911. The patient agrees to contact the PCP office for questions related to their healthcare. Patients top risk factors for readmission: none noted   Interventions to address risk factors: Obtained and reviewed discharge summary and/or continuity of care documents    [facility] follow up appointment(s):   Future Appointments   Date Time Provider Catia Mcallister   2022 11:40 AM MD MADISYN Hoang [facility]     Non-[facility] follow up appointment(s): none    CTN provided contact information for future needs. Plan for follow-up call in 5-7 days based on severity of symptoms and risk factors. Plan for next call: BP, discuss ACM     Goals Addressed                 This Visit's Progress     COMPLETED: ACP   On track     2022  -No ACP on file. Patient reports that she has been given ACP information numerous times. She does not wish to discuss. Health decision makers have been verified. Her daughters are both Legal next of kin. 3/30/2022  -No ACP on file.  Did not discuss with family. Will discuss with patient next call. -SP       Attends follow-up appointments as directed. On track     4/21/2022  -Attended Cardiology follow up on 4/8  -Will attend Cardiology appointment on 5/02  -CTN to follow up in 1 week  SP  04/08/2022  -Attended PCP follow up on 4/01  -Will attend Cardiology follow up today 4/8/2022  -CTN to follow up in 1 week  SP  3/30/2022  -will follow up with Cardiology 4/8/2022  -Daughter will call PCP for follow up appointment  -CTN to follow up in 1 week  SP       Prevent complications post hospitalization. On track     4/21/2022  -Reports BPs have stabilized and have been at goal. 120's/70'sCTN educated patient on BP parameters. Will report BP >180/100 or <80/40  -CTN to follow up in 1 week  SP  4/8/2022  -Patient reports that her blood pressure has normalized. She did not have exact numbers , but said that the [facility] nurse told her it was in normal range. CTN educated patient on BP parameters. Will report BP >180/100 or <80/40  -CTN to follow up in 1 week  SP  4/01/2022  -Fortino Page, reports that the Cardiology office did not contact her. Today her mother's BP is 160/93. Will continue to monitor. Will attend PCP follow up today. -CTN to follow up in 5 days  SP  3/31/2022  -Daughter called CTN to report elevated BP of 184/91 despite Cardiology increasing spironolactone to 50mg daily the day before. CTN called and left message with Cardiology office. -CTN to follow up in 1 day  SP  3/30/2022  -Will monitor BP and heart rate daily and record. Will notify provider with BP >180/100 or <80/40. Will notify provider with HR <50.   -CTN to follow up in 1 week  SP

## 2022-04-22 ENCOUNTER — TELEPHONE (OUTPATIENT)
Dept: PRIMARY CARE CLINIC | Age: 82
End: 2022-04-22

## 2022-04-22 DIAGNOSIS — I10 PRIMARY HYPERTENSION: Primary | ICD-10-CM

## 2022-04-22 NOTE — TELEPHONE ENCOUNTER
Patient needs to have labs done at the end of April per Dr Mattie Chappell so she's calling to have them ordered.

## 2022-04-25 NOTE — TELEPHONE ENCOUNTER
Called and spoke with the patient, I notified her that the labs have been placed for her to do some time this week

## 2022-04-28 ENCOUNTER — PATIENT OUTREACH (OUTPATIENT)
Dept: CASE MANAGEMENT | Age: 82
End: 2022-04-28

## 2022-04-28 NOTE — PROGRESS NOTES
Patient has graduated from the Transitions of Care Coordination  program on 4/28/2022. Patient/family has the ability to self-manage at this time Care management goals have been completed. Patient was not referred to the [facility] team for further management. Goals Addressed                 This Visit's Progress     COMPLETED: Attends follow-up appointments as directed. 4/21/2022  -Attended Cardiology follow up on 4/8  -Will attend Cardiology appointment on 5/02  -CTN to follow up in 1 week  SP  04/08/2022  -Attended PCP follow up on 4/01  -Will attend Cardiology follow up today 4/8/2022  -CTN to follow up in 1 week  SP  3/30/2022  -will follow up with Cardiology 4/8/2022  -Daughter will call PCP for follow up appointment  -CTN to follow up in 1 week  SP       COMPLETED: Prevent complications post hospitalization. 4/21/2022  -Reports BPs have stabilized and have been at goal. 120's/70'sCTN educated patient on BP parameters. Will report BP >180/100 or <80/40  -CTN to follow up in 1 week  SP  4/8/2022  -Patient reports that her blood pressure has normalized. She did not have exact numbers , but said that the [facility] nurse told her it was in normal range. CTN educated patient on BP parameters. Will report BP >180/100 or <80/40  -CTN to follow up in 1 week  SP  4/01/2022  -Yisel Hernandez, reports that the Cardiology office did not contact her. Today her mother's BP is 160/93. Will continue to monitor. Will attend PCP follow up today. -CTN to follow up in 5 days  SP  3/31/2022  -Daughter called CTN to report elevated BP of 184/91 despite Cardiology increasing spironolactone to 50mg daily the day before. CTN called and left message with Cardiology office. -CTN to follow up in 1 day  SP  3/30/2022  -Will monitor BP and heart rate daily and record. Will notify provider with BP >180/100 or <80/40. Will notify provider with HR <50.   -CTN to follow up in 1 week  SP            Patient has Care Transition Nurse's contact information for any further questions, concerns, or needs.   Patients upcoming visits:    Future Appointments   Date Time Provider Catia Mcallister   5/4/2022  3:40 PM MD MADISYN Ernst [facility]

## 2022-05-03 ENCOUNTER — TELEPHONE (OUTPATIENT)
Dept: PRIMARY CARE CLINIC | Age: 82
End: 2022-05-03

## 2022-05-03 NOTE — TELEPHONE ENCOUNTER
----- Message from [place] sent at 5/3/2022 11:32 AM EDT -----  Subject: Message to Provider    QUESTIONS  Information for Provider? PT will be making her appt tomorrow and wanted   to let the doctor know  ---------------------------------------------------------------------------  --------------  [address]  What is the best way for the office to contact you? OK to leave message on   voicemail  Preferred Call Back Phone Number? [phone]  ---------------------------------------------------------------------------  --------------  SCRIPT ANSWERS  Relationship to Patient?  Self

## 2022-05-04 ENCOUNTER — OFFICE VISIT (OUTPATIENT)
Dept: CARDIOLOGY CLINIC | Age: 82
End: 2022-05-04
Payer: MEDICARE

## 2022-05-04 VITALS
HEART RATE: 54 BPM | HEIGHT: 70 IN | BODY MASS INDEX: 29.06 KG/M2 | SYSTOLIC BLOOD PRESSURE: 120 MMHG | WEIGHT: 203 LBS | RESPIRATION RATE: 16 BRPM | DIASTOLIC BLOOD PRESSURE: 64 MMHG | OXYGEN SATURATION: 97 %

## 2022-05-04 DIAGNOSIS — I10 MALIGNANT HYPERTENSION: ICD-10-CM

## 2022-05-04 DIAGNOSIS — E78.5 DYSLIPIDEMIA: ICD-10-CM

## 2022-05-04 DIAGNOSIS — I45.5 SINUS PAUSE: Primary | ICD-10-CM

## 2022-05-04 PROCEDURE — 93005 ELECTROCARDIOGRAM TRACING: CPT | Performed by: SPECIALIST

## 2022-05-04 PROCEDURE — G8427 DOCREV CUR MEDS BY ELIG CLIN: HCPCS | Performed by: SPECIALIST

## 2022-05-04 PROCEDURE — G8419 CALC BMI OUT NRM PARAM NOF/U: HCPCS | Performed by: SPECIALIST

## 2022-05-04 PROCEDURE — 1123F ACP DISCUSS/DSCN MKR DOCD: CPT | Performed by: SPECIALIST

## 2022-05-04 PROCEDURE — 93010 ELECTROCARDIOGRAM REPORT: CPT | Performed by: SPECIALIST

## 2022-05-04 PROCEDURE — G8399 PT W/DXA RESULTS DOCUMENT: HCPCS | Performed by: SPECIALIST

## 2022-05-04 PROCEDURE — G8754 DIAS BP LESS 90: HCPCS | Performed by: SPECIALIST

## 2022-05-04 PROCEDURE — 99214 OFFICE O/P EST MOD 30 MIN: CPT | Performed by: SPECIALIST

## 2022-05-04 PROCEDURE — 1090F PRES/ABSN URINE INCON ASSESS: CPT | Performed by: SPECIALIST

## 2022-05-04 PROCEDURE — 1101F PT FALLS ASSESS-DOCD LE1/YR: CPT | Performed by: SPECIALIST

## 2022-05-04 PROCEDURE — G8752 SYS BP LESS 140: HCPCS | Performed by: SPECIALIST

## 2022-05-04 PROCEDURE — G0463 HOSPITAL OUTPT CLINIC VISIT: HCPCS | Performed by: SPECIALIST

## 2022-05-04 PROCEDURE — G8536 NO DOC ELDER MAL SCRN: HCPCS | Performed by: SPECIALIST

## 2022-05-04 PROCEDURE — G8510 SCR DEP NEG, NO PLAN REQD: HCPCS | Performed by: SPECIALIST

## 2022-05-04 NOTE — PROGRESS NOTES
Sonal Pride     1940       Honorio Sunshine MD, [facility]  Date of Visit-5/4/2022   PCP is Amie Aponte, [address] [facility] [place]  [facility]  HPI:  Sonal Pride is a 79 y.o. female   Pt seen in hospital 3/27/22 for bradycardia. She had an EKG with junctional rhythm and 3 second pauses. Recommended she stop her BB and avoid clonidine if possible. Pt was seen by Cardiology NP 4/18/22 and was stable. Today. .. Pt ambulates with a cane and is accompanied by her daughter. Pt states that she has improved significantly. She notes she has changed her diet and has reduced sodium. She notes her BP was around 120/75 at PT this morning. Denies chest pain, edema, syncope or shortness of breath at rest, has no tachycardia, palpitations or sense of arrhythmia. EKG: Atrial  Bradycardia   P:QRS - 1:1, Abnormal P axis, H Rate 54  -Old anteroseptal infarct. Assessment/Plan:     Patient Instructions   Please stop your chlorthalidone and have labs repeated with PCP in about a month. 1. Sinus pause  Off of beta blockers, rhythm is now stable. - AMB POC EKG ROUTINE W/ 12 LEADS, INTER & REP    2. Malignant hypertension  Creatinine is elevated. I will advise her to stop chlorthalidone. I would like her to check chemistry with PCP in a month. As long as below 1.5 proceed, if BP goes back up, reevaluate, including maybe even going back on chlorthalidone. Substantial improvement in BP with her on a better sodium diet. Echo was normal.   meds and possible side effects reviewed and patient denies  Lab Results   Component Value Date/Time    Creatinine 1.59 (H) 04/28/2022 01:14 PM      Key CAD CHF Meds             spironolactone (ALDACTONE) 25 mg tablet (Taking) Take 2 Tablets by mouth daily. prazosin (MINIPRESS) 1 mg capsule (Taking) Take 1 Capsule by mouth two (2) times a day. losartan (COZAAR) 100 mg tablet (Taking) Take 1 Tablet by mouth daily. amLODIPine (NORVASC) 10 mg tablet (Taking) Take 1 Tablet by mouth daily. aspirin 81 mg chewable tablet (Taking) Take 81 mg by mouth daily. BP Readings from Last 6 Encounters:   06/01/22 (!) 150/86   05/04/22 120/64   04/08/22 (!) 150/80   04/01/22 128/78   03/29/22 (!) 174/61   03/08/22 (!) 152/82      03/26/22    ECHO ADULT COMPLETE 03/29/2022 3/29/2022    Interpretation Summary    Left Ventricle: Left ventricle size is normal. Normal wall thickness. Normal wall motion. Normal left ventricular systolic function with a visually estimated EF of 55 - 60%.   Mitral Valve: Mild transvalvular regurgitation.   Pulmonary Arteries: Mild pulmonary hypertension present. Signed by: Josie Nicholas MD on 3/29/2022 12:40 PM      3. Dyslipidemia  At goal , denies excess muscle aches or new liver issues  Key Antihyperlipidemia Meds     The patient is on no antihyperlipidemia meds. Lab Results   Component Value Date/Time    LDL, calculated 53.4 03/27/2022 12:52 AM         F/u PRN     Impression:   1. Sinus pause    2. Malignant hypertension    3. Dyslipidemia       Cardiac History:   No specialty comments available. No future appointments. Patient Care Team:  Noah Chavira MD as PCP - General (Internal Medicine Physician)  Noah Chavira MD as PCP - [address] [place] [address] [facility]  Lucio Duran MD as Consulting Provider (Orthopedic Surgery)  Tierra Vazquez MD (Family Medicine)  Caryle Farrier, NP (Nurse Practitioner)    ROS-except as noted above. . A complete cardiac and respiratory are reviewed and negative except as above ; Resp-denies wheezing  or productive cough,.  Const- No unusual weight loss or fever; Neuro-no recent seizure or CVA ; GI- No BRBPR, abdom pain, bloating ; - no  hematuria   see supplement sheet, initialed and to be scanned by staff  Past Medical History:   Diagnosis Date    Anxiety     Arthritis     GERD (gastroesophageal reflux disease)     Hypertension     Psychiatric disorder     anxiety d/o      Social Hx= reports that she quit smoking about 32 years ago. She quit after 8.00 years of use. She has never used smokeless tobacco. She reports that she does not drink alcohol and does not use drugs. Exam and Labs:  /64   Pulse (!) 54   Resp 16   Ht 5' 10\" (1.778 m)   Wt 203 lb (92.1 kg)   SpO2 97%   BMI 29.13 kg/m² Constitutional:  NAD, comfortable  Head: NC,AT. Eyes: No scleral icterus. Neck:  Neck supple. No JVD present. Throat: moist mucous membranes. Chest: Effort normal & normal respiratory excursion . Neurological: alert, conversant and oriented . Skin: Skin is not cold. No obvious systemic rash noted. Not diaphoretic. No erythema. Psychiatric:  Grossly normal mood and affect. Behavior appears normal. Extremities:  no clubbing or cyanosis. Abdomen: non distended    Lungs:breath sounds normal. No stridor. distress, wheezes or  Rales. Heart: normal rate, regular rhythm, normal S1, S2, no murmurs, rubs, clicks or gallops , PMI non displaced. Edema: Edema is none.   Lab Results   Component Value Date/Time    Cholesterol, total 126 03/27/2022 12:52 AM    HDL Cholesterol 61 03/27/2022 12:52 AM    LDL, calculated 53.4 03/27/2022 12:52 AM    Triglyceride 58 03/27/2022 12:52 AM    CHOL/HDL Ratio 2.1 03/27/2022 12:52 AM     Lab Results   Component Value Date/Time    Sodium 141 04/28/2022 01:14 PM    Potassium 4.4 04/28/2022 01:14 PM    Chloride 105 04/28/2022 01:14 PM    CO2 26 04/28/2022 01:14 PM    Anion gap 10 04/28/2022 01:14 PM    Glucose 79 04/28/2022 01:14 PM    BUN 36 (H) 04/28/2022 01:14 PM    Creatinine 1.59 (H) 04/28/2022 01:14 PM    BUN/Creatinine ratio 23 (H) 04/28/2022 01:14 PM    GFR est AA 38 (L) 04/28/2022 01:14 PM    GFR est non-AA 31 (L) 04/28/2022 01:14 PM    Calcium 9.3 04/28/2022 01:14 PM      Wt Readings from Last 3 Encounters:   05/04/22 203 lb (92.1 kg)   04/08/22 207 lb (93.9 kg)   04/01/22 204 lb (92.5 kg)      BP Readings from Last 3 Encounters:   05/04/22 120/64   04/08/22 (!) 150/80   04/01/22 128/78      Current Outpatient Medications   Medication Sig    spironolactone (ALDACTONE) 25 mg tablet Take 2 Tablets by mouth daily.  prazosin (MINIPRESS) 1 mg capsule Take 1 Capsule by mouth two (2) times a day.  losartan (COZAAR) 100 mg tablet Take 1 Tablet by mouth daily.  amLODIPine (NORVASC) 10 mg tablet Take 1 Tablet by mouth daily.  cholecalciferol (VITAMIN D3) (2,000 UNITS /50 MCG) cap capsule Take 1 Capsule by mouth daily for 90 days.  famotidine (PEPCID) 40 mg tablet TAKE 1 TABLET BY MOUTH DAILY    montelukast (SINGULAIR) 10 mg tablet TAKE 1 TABLET BY MOUTH EVERY DAY    cetirizine (ZYRTEC) 10 mg tablet Take 1 Tablet by mouth daily.  azelastine (ASTELIN) 137 mcg (0.1 %) nasal spray USE 1 SPRAY IN EACH NOSTRIL TWICE DAILY AS DIRECTED    aspirin 81 mg chewable tablet Take 81 mg by mouth daily. No current facility-administered medications for this visit. Impression see above.       Written by Codie Zhao, as dictated by Tamy Phelps MD.

## 2022-05-04 NOTE — Clinical Note
5/4/2022    Patient: Raiza Beal   YOB: 1940   Date of Visit: 5/4/2022     Giovanny Doss MD  [address] [place] [address]  [place] [address]    Dear Giovanny Doss MD,      Thank you for referring Ms. Vijaya Banks to [facility] for evaluation. My notes for this consultation are attached. If you have questions, please do not hesitate to call me. I look forward to following your patient along with you.       Sincerely,    Jessica Hill MD

## 2022-05-08 DIAGNOSIS — J30.89 ENVIRONMENTAL AND SEASONAL ALLERGIES: ICD-10-CM

## 2022-05-08 RX ORDER — MONTELUKAST SODIUM 10 MG/1
TABLET ORAL
Qty: 90 TABLET | Refills: 0 | Status: SHIPPED | OUTPATIENT
Start: 2022-05-08 | End: 2022-06-01 | Stop reason: ALTCHOICE

## 2022-05-27 DIAGNOSIS — J30.89 ENVIRONMENTAL AND SEASONAL ALLERGIES: ICD-10-CM

## 2022-05-27 RX ORDER — AZELASTINE 1 MG/ML
SPRAY, METERED NASAL
Qty: 1 EACH | Refills: 1 | Status: SHIPPED | OUTPATIENT
Start: 2022-05-27

## 2022-06-01 ENCOUNTER — OFFICE VISIT (OUTPATIENT)
Dept: PRIMARY CARE CLINIC | Age: 82
End: 2022-06-01
Payer: MEDICARE

## 2022-06-01 VITALS
SYSTOLIC BLOOD PRESSURE: 150 MMHG | DIASTOLIC BLOOD PRESSURE: 86 MMHG | OXYGEN SATURATION: 98 % | HEART RATE: 84 BPM | BODY MASS INDEX: 29.41 KG/M2 | TEMPERATURE: 97.1 F | RESPIRATION RATE: 20 BRPM | WEIGHT: 205.4 LBS | HEIGHT: 70 IN

## 2022-06-01 DIAGNOSIS — Z91.09 ENVIRONMENTAL ALLERGIES: ICD-10-CM

## 2022-06-01 DIAGNOSIS — F41.9 ANXIETY: ICD-10-CM

## 2022-06-01 DIAGNOSIS — R79.89 ELEVATED SERUM CREATININE: ICD-10-CM

## 2022-06-01 DIAGNOSIS — I10 PRIMARY HYPERTENSION: Primary | ICD-10-CM

## 2022-06-01 DIAGNOSIS — K21.9 GASTROESOPHAGEAL REFLUX DISEASE WITHOUT ESOPHAGITIS: ICD-10-CM

## 2022-06-01 PROCEDURE — G8419 CALC BMI OUT NRM PARAM NOF/U: HCPCS | Performed by: INTERNAL MEDICINE

## 2022-06-01 PROCEDURE — G8510 SCR DEP NEG, NO PLAN REQD: HCPCS | Performed by: INTERNAL MEDICINE

## 2022-06-01 PROCEDURE — G8536 NO DOC ELDER MAL SCRN: HCPCS | Performed by: INTERNAL MEDICINE

## 2022-06-01 PROCEDURE — 99214 OFFICE O/P EST MOD 30 MIN: CPT | Performed by: INTERNAL MEDICINE

## 2022-06-01 PROCEDURE — G8399 PT W/DXA RESULTS DOCUMENT: HCPCS | Performed by: INTERNAL MEDICINE

## 2022-06-01 PROCEDURE — 1123F ACP DISCUSS/DSCN MKR DOCD: CPT | Performed by: INTERNAL MEDICINE

## 2022-06-01 PROCEDURE — G8754 DIAS BP LESS 90: HCPCS | Performed by: INTERNAL MEDICINE

## 2022-06-01 PROCEDURE — 1101F PT FALLS ASSESS-DOCD LE1/YR: CPT | Performed by: INTERNAL MEDICINE

## 2022-06-01 PROCEDURE — G8427 DOCREV CUR MEDS BY ELIG CLIN: HCPCS | Performed by: INTERNAL MEDICINE

## 2022-06-01 PROCEDURE — 1090F PRES/ABSN URINE INCON ASSESS: CPT | Performed by: INTERNAL MEDICINE

## 2022-06-01 PROCEDURE — G8753 SYS BP > OR = 140: HCPCS | Performed by: INTERNAL MEDICINE

## 2022-06-01 RX ORDER — FAMOTIDINE 40 MG/1
TABLET, FILM COATED ORAL
Qty: 90 TABLET | Refills: 0 | Status: SHIPPED | OUTPATIENT
Start: 2022-06-01 | End: 2022-09-02

## 2022-06-01 RX ORDER — ESCITALOPRAM OXALATE 5 MG/1
5 TABLET ORAL DAILY
Qty: 30 TABLET | Refills: 0 | Status: SHIPPED | OUTPATIENT
Start: 2022-06-01 | End: 2022-07-01

## 2022-06-01 RX ORDER — ZAFIRLUKAST 10 MG/1
10 TABLET, FILM COATED ORAL 2 TIMES DAILY
Qty: 60 TABLET | Refills: 0 | Status: SHIPPED | OUTPATIENT
Start: 2022-06-01 | End: 2022-07-01

## 2022-06-01 NOTE — PROGRESS NOTES
Alfie Stephens (: 1940) is a 79 y.o. female, established patient, here for evaluation of the following chief complaint(s):  Follow-up (Blood work ordered for  and allergies lot of white mucus, face itching and coughing )     Written by Ricco Cisse, as dictated by Dr. Carolyn Ahumada MD.      ASSESSMENT/PLAN:  Below is the assessment and plan developed based on review of pertinent history, physical exam, labs, studies, and medications. 1. Primary hypertension  Continue with spironolactone 25 mg 2 tabs daily, losartan 100 mg daily, and amlodipine 10 mg daily. Dr. Yvette Mcconnell (cardiology) d/c chlorthalidone due to elevated creatinine. 2. Environmental allergies  Will switch her from Singulair to Accolate 10 mg BID. Potential side effects were discussed. Continue with Astelin nasal spray and Zyrtec daily. -     zafirlukast (ACCOLATE) 10 mg tablet; Take 1 Tablet by mouth two (2) times a day for 30 days. , Normal, Disp-60 Tablet, R-0 sent to pharmacy. 3. Elevated serum creatinine  Recheck CMP. If creatinine is still elevated, will refer to Nephrology.   -     METABOLIC PANEL, COMPREHENSIVE; Future    4. Anxiety  Will start her on Lexapro 5 mg daily. Potential side effects were discussed. -     escitalopram oxalate (LEXAPRO) 5 mg tablet; Take 1 Tablet by mouth daily for 30 days. , Normal, Disp-30 Tablet, R-0 sent to pharmacy. SUBJECTIVE/OBJECTIVE:  HPI   The patient presents today for a follow-up of chronic conditions. Her BP today is elevated at 168/80, 150/86 on manual repeat. She is on spironolactone 25 mg 2 tabs daily, losartan 100 mg daily, and amlodipine 10 mg daily for HTN. She saw Dr. Yvette Mcconnell (cardiology) on  who stopped her chlorthalidone due to elevated creatinine. She is c/o seasonal allergies. She is taking Singulair 10 mg daily, Astelin nasal spray, and Zyrtec but still has a lot of congestion, cough, and itching on her face.      She is requesting medication for anxiety. She is currently only on Xanax 0.5 prn. Patient Active Problem List   Diagnosis Code    OA (osteoarthritis) M19.90    HTN (hypertension) I10    Allergic rhinitis J30.9    Gastroesophageal reflux disease without esophagitis K21.9    Anxiety disorder F41.9    Accelerated hypertension I10        Current Outpatient Medications on File Prior to Visit   Medication Sig Dispense Refill    spironolactone (ALDACTONE) 25 mg tablet Take 2 Tablets by mouth daily. 180 Tablet 0    prazosin (MINIPRESS) 1 mg capsule Take 1 Capsule by mouth two (2) times a day. 60 Capsule 2    losartan (COZAAR) 100 mg tablet Take 1 Tablet by mouth daily. 90 Tablet 2    amLODIPine (NORVASC) 10 mg tablet Take 1 Tablet by mouth daily. 90 Tablet 2    cholecalciferol (VITAMIN D3) (2,000 UNITS /50 MCG) cap capsule Take 1 Capsule by mouth daily for 90 days. 30 Capsule 2    cetirizine (ZYRTEC) 10 mg tablet Take 1 Tablet by mouth daily.  aspirin 81 mg chewable tablet Take 81 mg by mouth daily.  azelastine (ASTELIN) 137 mcg (0.1 %) nasal spray Use in each nostril as directed 1 Each 1    [DISCONTINUED] montelukast (SINGULAIR) 10 mg tablet TAKE 1 TABLET BY MOUTH EVERY DAY 90 Tablet 0    [DISCONTINUED] famotidine (PEPCID) 40 mg tablet TAKE 1 TABLET BY MOUTH DAILY 90 Tablet 0     No current facility-administered medications on file prior to visit.        Allergies   Allergen Reactions    Hydralazine Unknown (comments)    Other Plant, Animal, Environmental Runny Nose       Past Medical History:   Diagnosis Date    Anxiety     Arthritis     GERD (gastroesophageal reflux disease)     Hypertension     Psychiatric disorder     anxiety d/o       Past Surgical History:   Procedure Laterality Date    HX GYN      hysterectomy    HX GYN      hysterectomy 1980    HX ORTHOPAEDIC      total left knee    HX ORTHOPAEDIC      knee replacment 2007 left, 2014 right       Family History   Problem Relation Age of Onset    Stroke Maternal Grandmother     Hypertension Mother     Stroke Mother     Hypertension Brother     Stroke Brother        Social History     Socioeconomic History    Marital status:      Spouse name: Not on file    Number of children: Not on file    Years of education: Not on file    Highest education level: Not on file   Occupational History    Not on file   Tobacco Use    Smoking status: Former Smoker     Years: 8.00     Quit date: 1990     Years since quittin.4    Smokeless tobacco: Never Used   Vaping Use    Vaping Use: Never used   Substance and Sexual Activity    Alcohol use: No    Drug use: No    Sexual activity: Not Currently   Other Topics Concern    Not on file   Social History Narrative                Orders Only on 2022   Component Date Value Ref Range Status    Sodium 2022 141  136 - 145 mmol/L Final    Potassium 2022 4.4  3.5 - 5.1 mmol/L Final    Chloride 2022 105  97 - 108 mmol/L Final    CO2 2022 26  21 - 32 mmol/L Final    Anion gap 2022 10  5 - 15 mmol/L Final    Glucose 2022 79  65 - 100 mg/dL Final    BUN 2022 36* 6 - 20 MG/DL Final    Creatinine 2022 1.59* 0.55 - 1.02 MG/DL Final    BUN/Creatinine ratio 2022 23* 12 - 20   Final    GFR est AA 2022 38* >60 ml/min/1.73m2 Final    GFR est non-AA 2022 31* >60 ml/min/1.73m2 Final    Comment: Estimated GFR is calculated using the IDMS-traceable Modification of Diet in  Renal Disease (MDRD) Study equation, reported for both  Americans  (GFRAA) and non- Americans (GFRNA), and normalized to 1.73m2 body  surface area. The physician must decide which value applies to the patient.  Calcium 2022 9.3  8.5 - 10.1 MG/DL Final    Bilirubin, total 2022 0.3  0.2 - 1.0 MG/DL Final    ALT (SGPT) 2022 30  12 - 78 U/L Final    AST (SGOT) 2022 18  15 - 37 U/L Final    Alk.  phosphatase 2022 81  45 - 117 U/L Final    Protein, total 04/28/2022 7.1  6.4 - 8.2 g/dL Final    Albumin 04/28/2022 3.7  3.5 - 5.0 g/dL Final    Globulin 04/28/2022 3.4  2.0 - 4.0 g/dL Final    A-G Ratio 04/28/2022 1.1  1.1 - 2.2   Final    TSH 04/28/2022 1.90  0.36 - 3.74 uIU/mL Final    Comment:   Due to TSH heterogeneity, both structurally and degree of glycosylation,  monoclonal antibodies used in the TSH assay may not accurately quantitate TSH. Therefore, this result should be correlated with clinical findings as well as  with other assessments of thyroid function, e.g., free T4, free T3.      SAMPLES BEING HELD 04/28/2022 2SST   Final    COMMENT 04/28/2022 Add-on orders for these samples will be processed based on acceptable specimen integrity and analyte stability, which may vary by analyte. Final   No results displayed because visit has over 200 results.       Office Visit on 03/08/2022   Component Date Value Ref Range Status    WBC 03/08/2022 7.0  3.6 - 11.0 K/uL Final    RBC 03/08/2022 4.44  3.80 - 5.20 M/uL Final    HGB 03/08/2022 13.4  11.5 - 16.0 g/dL Final    HCT 03/08/2022 42.6  35.0 - 47.0 % Final    MCV 03/08/2022 95.9  80.0 - 99.0 FL Final    MCH 03/08/2022 30.2  26.0 - 34.0 PG Final    MCHC 03/08/2022 31.5  30.0 - 36.5 g/dL Final    RDW 03/08/2022 12.8  11.5 - 14.5 % Final    PLATELET 65/27/3500 862  150 - 400 K/uL Final    MPV 03/08/2022 11.9  8.9 - 12.9 FL Final    NRBC 03/08/2022 0.0  0  WBC Final    ABSOLUTE NRBC 03/08/2022 0.00  0.00 - 0.01 K/uL Final    Sodium 03/08/2022 136  136 - 145 mmol/L Final    Potassium 03/08/2022 4.1  3.5 - 5.1 mmol/L Final    Chloride 03/08/2022 100  97 - 108 mmol/L Final    CO2 03/08/2022 31  21 - 32 mmol/L Final    Anion gap 03/08/2022 5  5 - 15 mmol/L Final    Glucose 03/08/2022 94  65 - 100 mg/dL Final    BUN 03/08/2022 13  6 - 20 MG/DL Final    Creatinine 03/08/2022 0.98  0.55 - 1.02 MG/DL Final    BUN/Creatinine ratio 03/08/2022 13  12 - 20   Final    GFR est AA 03/08/2022 >60  >60 ml/min/1.73m2 Final    GFR est non-AA 03/08/2022 54* >60 ml/min/1.73m2 Final    Comment: Estimated GFR is calculated using the IDMS-traceable Modification of Diet in  Renal Disease (MDRD) Study equation, reported for both  Americans  (GFRAA) and non- Americans (GFRNA), and normalized to 1.73m2 body  surface area. The physician must decide which value applies to the patient.  Calcium 03/08/2022 9.7  8.5 - 10.1 MG/DL Final    Bilirubin, total 03/08/2022 0.7  0.2 - 1.0 MG/DL Final    ALT (SGPT) 03/08/2022 21  12 - 78 U/L Final    AST (SGOT) 03/08/2022 23  15 - 37 U/L Final    Alk. phosphatase 03/08/2022 78  45 - 117 U/L Final    Protein, total 03/08/2022 7.6  6.4 - 8.2 g/dL Final    Albumin 03/08/2022 4.3  3.5 - 5.0 g/dL Final    Globulin 03/08/2022 3.3  2.0 - 4.0 g/dL Final    A-G Ratio 03/08/2022 1.3  1.1 - 2.2   Final    SAMPLES BEING HELD 03/08/2022 1SST   Final    COMMENT 03/08/2022 Add-on orders for these samples will be processed based on acceptable specimen integrity and analyte stability, which may vary by analyte. Final      Review of Systems   Constitutional: Negative for activity change, fatigue and unexpected weight change. HENT: Negative for congestion, hearing loss, rhinorrhea and sore throat. Eyes: Negative for discharge. Respiratory: Negative for cough, chest tightness and shortness of breath. Cardiovascular: Negative for leg swelling. Gastrointestinal: Negative for abdominal pain, constipation and diarrhea. Genitourinary: Negative for dysuria, flank pain, frequency and urgency. Musculoskeletal: Negative for arthralgias, back pain and myalgias. Skin: Negative for color change and rash. Neurological: Negative for dizziness, light-headedness and headaches. Psychiatric/Behavioral: Negative for dysphoric mood and sleep disturbance. The patient is not nervous/anxious.       Visit Vitals  BP (!) 150/86 (BP 1 Location: Left arm, BP Patient Position: Sitting)   Pulse 84   Temp 97.1 °F (36.2 °C) (Temporal)   Resp 20   Ht 5' 10\" (1.778 m)   Wt 205 lb 6.4 oz (93.2 kg)   SpO2 98%   BMI 29.47 kg/m²      Physical Exam  Vitals and nursing note reviewed. Constitutional:       General: She is not in acute distress. Appearance: Normal appearance. She is well-developed. She is not diaphoretic. HENT:      Right Ear: External ear normal.      Left Ear: External ear normal.   Eyes:      General: No scleral icterus. Right eye: No discharge. Left eye: No discharge. Extraocular Movements: Extraocular movements intact. Conjunctiva/sclera: Conjunctivae normal.   Cardiovascular:      Rate and Rhythm: Normal rate and regular rhythm. Pulmonary:      Effort: Pulmonary effort is normal.      Breath sounds: Normal breath sounds. No wheezing. Abdominal:      General: Bowel sounds are normal.      Palpations: Abdomen is soft. Tenderness: There is no abdominal tenderness. Musculoskeletal:      Cervical back: Normal range of motion and neck supple. Lymphadenopathy:      Cervical: No cervical adenopathy. Neurological:      Mental Status: She is alert and oriented to person, place, and time. Psychiatric:         Mood and Affect: Mood and affect normal.       An electronic signature was used to authenticate this note.   -- Susannah Reza

## 2022-06-01 NOTE — PROGRESS NOTES
1. \"Have you been to the ER, urgent care clinic since your last visit? Hospitalized since your last visit? \" No    2. \"Have you seen or consulted any other health care providers outside of the [address] [place] [address] since your last visit? \" No     3. For patients aged 39-70: Has the patient had a colonoscopy / FIT/ Cologuard? NA - based on age      If the patient is female:    4. For patients aged 41-77: Has the patient had a mammogram within the past 2 years? NA - based on age or sex      11. For patients aged 21-65: Has the patient had a pap smear?  NA - based on age or sex     Chief Complaint   Patient presents with    Follow-up     Blood work ordered for dilcia and allergies lot of white mucus, face itching and coughing

## 2022-06-08 DIAGNOSIS — E55.9 VITAMIN D DEFICIENCY: ICD-10-CM

## 2022-06-08 RX ORDER — ACETAMINOPHEN 500 MG
TABLET ORAL
Qty: 30 CAPSULE | Refills: 2 | Status: SHIPPED | OUTPATIENT
Start: 2022-06-08

## 2022-07-13 DIAGNOSIS — I10 HTN (HYPERTENSION), BENIGN: ICD-10-CM

## 2022-07-13 RX ORDER — PRAZOSIN HYDROCHLORIDE 1 MG/1
CAPSULE ORAL
Qty: 60 CAPSULE | Refills: 2 | Status: SHIPPED | OUTPATIENT
Start: 2022-07-13

## 2022-09-02 DIAGNOSIS — K21.9 GASTROESOPHAGEAL REFLUX DISEASE WITHOUT ESOPHAGITIS: ICD-10-CM

## 2022-09-02 RX ORDER — FAMOTIDINE 40 MG/1
TABLET, FILM COATED ORAL
Qty: 90 TABLET | Refills: 0 | Status: SHIPPED | OUTPATIENT
Start: 2022-09-02

## 2022-10-23 DIAGNOSIS — J30.89 ENVIRONMENTAL AND SEASONAL ALLERGIES: ICD-10-CM

## 2022-10-23 RX ORDER — MONTELUKAST SODIUM 10 MG/1
TABLET ORAL
Qty: 90 TABLET | Refills: 0 | Status: SHIPPED | OUTPATIENT
Start: 2022-10-23

## 2022-11-28 DIAGNOSIS — K21.9 GASTROESOPHAGEAL REFLUX DISEASE WITHOUT ESOPHAGITIS: ICD-10-CM

## 2022-11-28 RX ORDER — FAMOTIDINE 40 MG/1
TABLET, FILM COATED ORAL
Qty: 90 TABLET | Refills: 0 | Status: SHIPPED | OUTPATIENT
Start: 2022-11-28

## 2023-01-06 RX ORDER — SPIRONOLACTONE 25 MG/1
50 TABLET ORAL DAILY
Qty: 180 TABLET | Refills: 1 | Status: SHIPPED | OUTPATIENT
Start: 2023-01-06

## 2023-01-27 RX ORDER — AMLODIPINE BESYLATE 10 MG/1
10 TABLET ORAL DAILY
Qty: 90 TABLET | Refills: 2 | Status: SHIPPED | OUTPATIENT
Start: 2023-01-27

## 2023-02-01 DIAGNOSIS — J30.89 ENVIRONMENTAL AND SEASONAL ALLERGIES: ICD-10-CM

## 2023-02-01 RX ORDER — MONTELUKAST SODIUM 10 MG/1
TABLET ORAL
Qty: 90 TABLET | Refills: 0 | Status: SHIPPED | OUTPATIENT
Start: 2023-02-01

## 2023-02-28 DIAGNOSIS — K21.9 GASTROESOPHAGEAL REFLUX DISEASE WITHOUT ESOPHAGITIS: ICD-10-CM

## 2023-02-28 DIAGNOSIS — J30.89 ENVIRONMENTAL AND SEASONAL ALLERGIES: ICD-10-CM

## 2023-02-28 RX ORDER — FAMOTIDINE 40 MG/1
TABLET, FILM COATED ORAL
Qty: 90 TABLET | Refills: 0 | Status: SHIPPED | OUTPATIENT
Start: 2023-02-28

## 2023-02-28 RX ORDER — AZELASTINE 1 MG/ML
SPRAY, METERED NASAL
Qty: 1 EACH | Refills: 2 | Status: SHIPPED | OUTPATIENT
Start: 2023-02-28

## 2023-03-21 ENCOUNTER — OFFICE VISIT (OUTPATIENT)
Dept: PRIMARY CARE CLINIC | Age: 83
End: 2023-03-21
Payer: MEDICARE

## 2023-03-21 VITALS
OXYGEN SATURATION: 94 % | TEMPERATURE: 97.5 F | RESPIRATION RATE: 17 BRPM | HEIGHT: 70 IN | SYSTOLIC BLOOD PRESSURE: 132 MMHG | BODY MASS INDEX: 30.26 KG/M2 | DIASTOLIC BLOOD PRESSURE: 74 MMHG | HEART RATE: 92 BPM | WEIGHT: 211.4 LBS

## 2023-03-21 DIAGNOSIS — I10 PRIMARY HYPERTENSION: ICD-10-CM

## 2023-03-21 DIAGNOSIS — J30.89 SEASONAL ALLERGIC RHINITIS DUE TO OTHER ALLERGIC TRIGGER: ICD-10-CM

## 2023-03-21 DIAGNOSIS — Z00.00 MEDICARE ANNUAL WELLNESS VISIT, SUBSEQUENT: Primary | ICD-10-CM

## 2023-03-21 DIAGNOSIS — J30.89 ENVIRONMENTAL AND SEASONAL ALLERGIES: ICD-10-CM

## 2023-03-21 DIAGNOSIS — F41.8 OTHER SPECIFIED ANXIETY DISORDERS: ICD-10-CM

## 2023-03-21 DIAGNOSIS — H91.93 DECREASED HEARING OF BOTH EARS: ICD-10-CM

## 2023-03-21 PROCEDURE — 3078F DIAST BP <80 MM HG: CPT | Performed by: INTERNAL MEDICINE

## 2023-03-21 PROCEDURE — 99214 OFFICE O/P EST MOD 30 MIN: CPT | Performed by: INTERNAL MEDICINE

## 2023-03-21 PROCEDURE — 1123F ACP DISCUSS/DSCN MKR DOCD: CPT | Performed by: INTERNAL MEDICINE

## 2023-03-21 PROCEDURE — 1101F PT FALLS ASSESS-DOCD LE1/YR: CPT | Performed by: INTERNAL MEDICINE

## 2023-03-21 PROCEDURE — G0439 PPPS, SUBSEQ VISIT: HCPCS | Performed by: INTERNAL MEDICINE

## 2023-03-21 PROCEDURE — 3075F SYST BP GE 130 - 139MM HG: CPT | Performed by: INTERNAL MEDICINE

## 2023-03-21 PROCEDURE — 1090F PRES/ABSN URINE INCON ASSESS: CPT | Performed by: INTERNAL MEDICINE

## 2023-03-21 PROCEDURE — G8427 DOCREV CUR MEDS BY ELIG CLIN: HCPCS | Performed by: INTERNAL MEDICINE

## 2023-03-21 PROCEDURE — G8536 NO DOC ELDER MAL SCRN: HCPCS | Performed by: INTERNAL MEDICINE

## 2023-03-21 PROCEDURE — G8399 PT W/DXA RESULTS DOCUMENT: HCPCS | Performed by: INTERNAL MEDICINE

## 2023-03-21 PROCEDURE — G8417 CALC BMI ABV UP PARAM F/U: HCPCS | Performed by: INTERNAL MEDICINE

## 2023-03-21 PROCEDURE — G8432 DEP SCR NOT DOC, RNG: HCPCS | Performed by: INTERNAL MEDICINE

## 2023-03-21 RX ORDER — ESCITALOPRAM OXALATE 5 MG/1
5 TABLET ORAL DAILY
Qty: 30 TABLET | Refills: 0 | Status: CANCELLED | OUTPATIENT
Start: 2023-03-21 | End: 2023-04-20

## 2023-03-21 RX ORDER — LOSARTAN POTASSIUM 100 MG/1
100 TABLET ORAL DAILY
Qty: 90 TABLET | Refills: 2 | Status: CANCELLED | OUTPATIENT
Start: 2023-03-21

## 2023-03-21 RX ORDER — LEVOCETIRIZINE DIHYDROCHLORIDE 5 MG/1
5 TABLET, FILM COATED ORAL DAILY
Qty: 90 TABLET | Refills: 0 | Status: SHIPPED | OUTPATIENT
Start: 2023-03-21 | End: 2023-06-19

## 2023-03-21 RX ORDER — LOSARTAN POTASSIUM 100 MG/1
100 TABLET ORAL DAILY
Qty: 90 TABLET | Refills: 0 | Status: SHIPPED | OUTPATIENT
Start: 2023-03-21 | End: 2023-06-19

## 2023-03-21 RX ORDER — AZELASTINE 1 MG/ML
1 SPRAY, METERED NASAL 2 TIMES DAILY
Qty: 1 EACH | Refills: 1 | Status: SHIPPED | OUTPATIENT
Start: 2023-03-21 | End: 2023-04-20

## 2023-03-21 RX ORDER — FLUTICASONE PROPIONATE 50 MCG
SPRAY, SUSPENSION (ML) NASAL
Qty: 1 EACH | Refills: 1 | Status: SHIPPED | OUTPATIENT
Start: 2023-03-21

## 2023-03-21 NOTE — PROGRESS NOTES
Mary Ann Carney (: 1940) is a 79 y.o. female, established patient, here for evaluation of the following chief complaint(s): Annual Wellness Visit and Follow Up Chronic Condition       ASSESSMENT/PLAN:  Below is the assessment and plan developed based on review of pertinent history, physical exam, labs, studies, and medications. 1. Primary hypertension  -     losartan (COZAAR) 100 mg tablet; Take 1 Tablet by mouth daily for 90 days. , Normal, Disp-90 Tablet, R-0 sent to pharmacy.   -     METABOLIC PANEL, COMPREHENSIVE; Future  -     CBC W/O DIFF; Future  -     LIPID PANEL; Future  I ordered a CBC, CMP, and a lipid panel. I recommend that she continue taking spironolactone 25 mg daily, losartan 100 mg daily, and amlodipine 10 mg daily. I refilled her losartan prescription. I recommend she take Minipress two tablets at night. 2. Environmental and seasonal allergies  -     levocetirizine (XYZAL) 5 mg tablet; Take 1 Tablet by mouth daily for 90 days. , Normal, Disp-90 Tablet, R-0 sent to pharmacy. I recommend that she take Xyzal 5 mg daily. Potential side effects were discussed. 3. Other specified anxiety disorders  I recommend that she continue taking Lexapro 2.5 mg daily. 4. Seasonal allergic rhinitis due to other allergic trigger  -     fluticasone propionate (FLONASE) 50 mcg/actuation nasal spray; 1 spray each nostril daily, Normal, Disp-1 Each, R-1 sent to pharmacy. -     azelastine (ASTELIN) 137 mcg (0.1 %) nasal spray; 1 Mackey by Both Nostrils route two (2) times a day for 30 days. Use in each nostril as directed, Normal, Disp-1 Each, R-1 sent to pharmacy. I prescribed Flonase to be used daily. Potential side effects were discussed. I prescribed Astelin. Potential side effects were discussed. 6. Decreased hearing of both ears  -     REFERRAL TO ENT-OTOLARYNGOLOGY   I referred her to Otolaryngology. SUBJECTIVE/OBJECTIVE:  HPI  Patient presents today for a MWV.   She is fasting today.    She reports that she has been checking her BP at home and it has been in the normal range. She is taking spironolactone 25 mg daily, losartan 100 mg daily, minipress 1 mg BID, and amlodipine 10 mg daily. She has a little swelling in her legs. She states that her allergies have increased recently. She has a cough and mucus drainage in her throat. She reports that she breathes and has occasional wheezing. She notes that she has been using a nasal spray for a long time and may want to change it. She states that she does not take her half of a 5 mg tablet of Lexapro daily but PRN. Patient Active Problem List   Diagnosis Code    OA (osteoarthritis) M19.90    HTN (hypertension) I10    Allergic rhinitis J30.9    Gastroesophageal reflux disease without esophagitis K21.9    Anxiety disorder F41.9        Current Outpatient Medications on File Prior to Visit   Medication Sig Dispense Refill    MULTIVIT 47-IRON-FOLATE 1-DHA PO Take  by mouth. famotidine (PEPCID) 40 mg tablet TAKE 1 TABLET BY MOUTH DAILY 90 Tablet 0    montelukast (SINGULAIR) 10 mg tablet TAKE 1 TABLET BY MOUTH EVERY DAY 90 Tablet 0    amLODIPine (NORVASC) 10 mg tablet TAKE 1 TABLET BY MOUTH DAILY 90 Tablet 2    spironolactone (ALDACTONE) 25 mg tablet TAKE 2 TABLETS BY MOUTH DAILY 180 Tablet 1    prazosin (MINIPRESS) 1 mg capsule TAKE 1 CAPSULE BY MOUTH TWICE DAILY 180 Capsule 2    aspirin 81 mg chewable tablet Take 81 mg by mouth daily. [DISCONTINUED] azelastine (ASTELIN) 137 mcg (0.1 %) nasal spray INSTILL 1 SPRAY INTO EACH NOSTRIL AS DIRECTED 1 Each 2    cholecalciferol (VITAMIN D3) (2,000 UNITS /50 MCG) cap capsule TAKE 1 CAPSULE BY MOUTH DAILY (Patient not taking: Reported on 3/21/2023) 30 Capsule 2    [DISCONTINUED] losartan (COZAAR) 100 mg tablet Take 1 Tablet by mouth daily. 90 Tablet 2    [DISCONTINUED] cetirizine (ZYRTEC) 10 mg tablet Take 1 Tablet by mouth daily.        No current facility-administered medications on file prior to visit. Allergies   Allergen Reactions    Hydralazine Unknown (comments)    Other Plant, Animal, Environmental Runny Nose       Past Medical History:   Diagnosis Date    Anxiety     Arthritis     GERD (gastroesophageal reflux disease)     Hypertension     Psychiatric disorder     anxiety d/o       Past Surgical History:   Procedure Laterality Date    HX GYN      hysterectomy    HX GYN      hysterectomy     HX ORTHOPAEDIC      total left knee    HX ORTHOPAEDIC      knee replacment  left, 2014 right       Family History   Problem Relation Age of Onset    Stroke Maternal Grandmother     Hypertension Mother     Stroke Mother     Hypertension Brother     Stroke Brother        Social History     Socioeconomic History    Marital status:      Spouse name: Not on file    Number of children: Not on file    Years of education: Not on file    Highest education level: Not on file   Occupational History    Not on file   Tobacco Use    Smoking status: Former     Years: 8.00     Types: Cigarettes     Quit date: 1990     Years since quittin.2    Smokeless tobacco: Never   Vaping Use    Vaping Use: Never used   Substance and Sexual Activity    Alcohol use: No    Drug use: No    Sexual activity: Not Currently   Other Topics Concern    Not on file   Social History Narrative    ** Merged History Encounter **          Social Determinants of Health     Financial Resource Strain: Unknown    Difficulty of Paying Living Expenses: Patient refused   Food Insecurity: Unknown    Worried About Running Out of Food in the Last Year: Patient refused    Ran Out of Food in the Last Year: Patient refused   Transportation Needs: Not on file   Physical Activity: Not on file   Stress: Not on file   Social Connections: Not on file   Intimate Partner Violence: Not on file   Housing Stability: Not on file       No visits with results within 3 Month(s) from this visit.    Latest known visit with results is:   Orders Only on 06/01/2022   Component Date Value Ref Range Status    Sodium 06/01/2022 137  136 - 145 mmol/L Final    Potassium 06/01/2022 4.4  3.5 - 5.1 mmol/L Final    Chloride 06/01/2022 104  97 - 108 mmol/L Final    CO2 06/01/2022 30  21 - 32 mmol/L Final    Anion gap 06/01/2022 3 (A)  5 - 15 mmol/L Final    Glucose 06/01/2022 91  65 - 100 mg/dL Final    BUN 06/01/2022 15  6 - 20 MG/DL Final    Creatinine 06/01/2022 1.11 (A)  0.55 - 1.02 MG/DL Final    BUN/Creatinine ratio 06/01/2022 14  12 - 20   Final    GFR est AA 06/01/2022 57 (A)  >60 ml/min/1.73m2 Final    GFR est non-AA 06/01/2022 47 (A)  >60 ml/min/1.73m2 Final    Estimated GFR is calculated using the IDMS-traceable Modification of Diet in Renal Disease (MDRD) Study equation, reported for both  Americans (GFRAA) and non- Americans (GFRNA), and normalized to 1.73m2 body surface area. The physician must decide which value applies to the patient. Calcium 06/01/2022 9.9  8.5 - 10.1 MG/DL Final    Bilirubin, total 06/01/2022 0.6  0.2 - 1.0 MG/DL Final    ALT (SGPT) 06/01/2022 26  12 - 78 U/L Final    AST (SGOT) 06/01/2022 23  15 - 37 U/L Final    Alk. phosphatase 06/01/2022 85  45 - 117 U/L Final    Protein, total 06/01/2022 7.4  6.4 - 8.2 g/dL Final    Albumin 06/01/2022 3.9  3.5 - 5.0 g/dL Final    Globulin 06/01/2022 3.5  2.0 - 4.0 g/dL Final    A-G Ratio 06/01/2022 1.1  1.1 - 2.2   Final       Review of Systems   Constitutional:  Negative for activity change, fatigue and unexpected weight change. HENT:  Positive for hearing loss. Negative for congestion, rhinorrhea and sore throat. Eyes:  Negative for discharge. Respiratory:  Negative for cough, chest tightness and shortness of breath. Cardiovascular:  Negative for leg swelling. Gastrointestinal:  Negative for abdominal pain, constipation and diarrhea. Genitourinary:  Negative for dysuria, flank pain, frequency and urgency.    Musculoskeletal:  Negative for arthralgias, back pain and myalgias. Skin:  Negative for color change and rash. Allergic/Immunologic: Positive for environmental allergies. Neurological:  Negative for dizziness, light-headedness and headaches. Psychiatric/Behavioral:  Negative for dysphoric mood and sleep disturbance. The patient is nervous/anxious. Visit Vitals  /74 (BP 1 Location: Left upper arm)   Pulse 92   Temp 97.5 °F (36.4 °C)   Resp 17   Ht 5' 10\" (1.778 m)   Wt 211 lb 6.4 oz (95.9 kg)   SpO2 94%   BMI 30.33 kg/m²       Physical Exam  Vitals and nursing note reviewed. Constitutional:       General: She is not in acute distress. Appearance: She is obese. She is not diaphoretic. HENT:      Right Ear: External ear normal.      Left Ear: External ear normal.   Eyes:      General: No scleral icterus. Right eye: No discharge. Left eye: No discharge. Extraocular Movements: Extraocular movements intact. Conjunctiva/sclera: Conjunctivae normal.   Cardiovascular:      Rate and Rhythm: Normal rate and regular rhythm. Pulmonary:      Effort: Pulmonary effort is normal.      Breath sounds: Normal breath sounds. No wheezing. Abdominal:      General: Bowel sounds are normal.      Palpations: Abdomen is soft. Tenderness: There is no abdominal tenderness. Musculoskeletal:      Cervical back: Normal range of motion and neck supple. Lymphadenopathy:      Cervical: No cervical adenopathy. Neurological:      Mental Status: She is alert and oriented to person, place, and time. Psychiatric:         Mood and Affect: Mood and affect normal.         I, Mikie Florentino MD, personally performed the services described in this documentation as scribed by Feliz Haq in my presence, and it is both accurate and complete. An electronic signature was used to authenticate this note.   -- Feliz Haq

## 2023-03-21 NOTE — PROGRESS NOTES
Chief Complaint   Patient presents with    Annual Wellness Visit       Visit Vitals  /74 (BP 1 Location: Left upper arm)   Pulse 92   Temp 97.5 °F (36.4 °C)   Resp 17   Ht 5' 10\" (1.778 m)   Wt 211 lb 6.4 oz (95.9 kg)   SpO2 94%   BMI 30.33 kg/m²       1. Have you been to the ER, urgent care clinic since your last visit? Hospitalized since your last visit? No    2. Have you seen or consulted any other health care providers outside of the [address] [place] [address] since your last visit? Include any pap smears or colon screening. No      3. For patients aged 39-70: Has the patient had a colonoscopy / FIT/ Cologuard? No            Gerry Matias is a 79 y.o. female and presents for Annual Medicare Wellness Visit. Assessment of cognitive impairment: Alert and oriented x 3. Depression Screen:   3 most recent PHQ Screens 6/1/2022   Little interest or pleasure in doing things Not at all   Feeling down, depressed, irritable, or hopeless Not at all   Total Score PHQ 2 0   Trouble falling or staying asleep, or sleeping too much -   Feeling tired or having little energy -   Poor appetite, weight loss, or overeating -   Feeling bad about yourself - or that you are a failure or have let yourself or your family down -   Trouble concentrating on things such as school, work, reading, or watching TV -   Moving or speaking so slowly that other people could have noticed; or the opposite being so fidgety that others notice -   Thoughts of being better off dead, or hurting yourself in some way -   PHQ 9 Score -       Fall Risk Assessment:    Fall Risk Assessment, last 12 mths 3/21/2023   Able to walk? Yes   Fall in past 12 months? 0   Do you feel unsteady? 0   Are you worried about falling 0   Is the gait abnormal? -   Number of falls in past 12 months -   Fall with injury? -       Abuse Screen:   Abuse Screening Questionnaire 3/21/2023   Do you ever feel afraid of your partner?  N   Are you in a relationship with someone who physically or mentally threatens you? N   Is it safe for you to go home? Y       Activities of Daily Living:  Self-care. Requires assistance with: no ADLs  Patient handle his/her own medications  yes Use of pill box  yes  Activities of Daily Living:   ADL Assessment 3/21/2023   Feeding yourself No Help Needed   Getting from bed to chair No Help Needed   Getting dressed No Help Needed   Bathing or showering No Help Needed   Walk across the room (includes cane/walker) Help Needed   Using the telphone No Help Needed   Taking your medications No Help Needed   Preparing meals No Help Needed   Managing money (expenses/bills) No Help Needed   Moderately strenuous housework (laundry) Help Needed   Shopping for personal items (toiletries/medicines) Help Needed   Shopping for groceries Help Needed   Driving Help Needed   Climbing a flight of stairs Help Needed   Getting to places beyond walking distances Help Needed       Health Maintenance:  Daily Aspirin: yes  Bone Density: 2019  Glaucoma Screening: yes  Immunizations:    Tetanus: up to date. Influenza: up to date. Shingles: up to date. PPSV-23: up to date - . Prevnar-13: up to date. PYGLM72: up to date    Cancer screening:    Cervical: na.  Breast: 2019. Colon: Cologuard. Alcohol Risk Screen:   On any occasion during the past 3 months, have you had more than 3 drinks(female) or 4 drinks (male) containing alcohol in one? No  Do you average more than 7 drinks (female) or 14 drinks (male) per week? No  Type and amount:no    Hearing Loss:  denies any hearing loss    Vision Loss:   Wears glasses, contact lenses, or have any other visual impairment  yes    Adult Nutrition Screen:  No risk factors noted. Advance Care Planning:   End of Life Planning: has an advanced directive - a copy HAS NOT been provided.   Sarkis Freitas [address] ACP-Facilitator appointment no      Medications/Allergies: Reviewed with patient  Prior to Admission medications Medication Sig Start Date End Date Taking? Authorizing Provider   MULTIVIT 47-IRON-FOLATE 1-DHA PO Take  by mouth. Yes Provider, Historical   famotidine (PEPCID) 40 mg tablet TAKE 1 TABLET BY MOUTH DAILY 2/28/23  Yes Michelle Iqbal MD   azelastine (ASTELIN) 137 mcg (0.1 %) nasal spray INSTILL 1 SPRAY INTO EACH NOSTRIL AS DIRECTED 2/28/23  Yes Michelle Iqbal MD   montelukast (SINGULAIR) 10 mg tablet TAKE 1 TABLET BY MOUTH EVERY DAY 2/1/23  Yes Michelle Iqbal MD   amLODIPine (NORVASC) 10 mg tablet TAKE 1 TABLET BY MOUTH DAILY 1/27/23  Yes Ene Morales NP   spironolactone (ALDACTONE) 25 mg tablet TAKE 2 TABLETS BY MOUTH DAILY 1/6/23  Yes Ene Morales NP   prazosin (MINIPRESS) 1 mg capsule TAKE 1 CAPSULE BY MOUTH TWICE DAILY 11/29/22  Yes Ene Morales NP   losartan (COZAAR) 100 mg tablet Take 1 Tablet by mouth daily. 4/8/22  Yes Marquis Mars Castro NP   cetirizine (ZYRTEC) 10 mg tablet Take 1 Tablet by mouth daily. Yes Provider, Historical   aspirin 81 mg chewable tablet Take 81 mg by mouth daily. Yes Provider, Historical   cholecalciferol (VITAMIN D3) (2,000 UNITS /50 MCG) cap capsule TAKE 1 CAPSULE BY MOUTH DAILY  Patient not taking: Reported on 3/21/2023 6/8/22   Michelle Iqbal MD       Allergies   Allergen Reactions    Hydralazine Unknown (comments)    Other Plant, Animal, Environmental Runny Nose       Objective:  Visit Vitals  /74 (BP 1 Location: Left upper arm)   Pulse 92   Temp 97.5 °F (36.4 °C)   Resp 17   Ht 5' 10\" (1.778 m)   Wt 211 lb 6.4 oz (95.9 kg)   SpO2 94%   BMI 30.33 kg/m²    Body mass index is 30.33 kg/m². Problem List: Reviewed with patient and discussed risk factors.     Patient Active Problem List   Diagnosis Code    OA (osteoarthritis) M19.90    HTN (hypertension) I10    Allergic rhinitis J30.9    Gastroesophageal reflux disease without esophagitis K21.9    Anxiety disorder F41.9    Accelerated hypertension I10       PSH: Reviewed with patient  Past Surgical History:   Procedure Laterality Date    HX GYN      hysterectomy    HX GYN      hysterectomy 1980    HX ORTHOPAEDIC      total left knee    HX ORTHOPAEDIC      knee replacment 2007 left, 2014 right        SH: Reviewed with patient  Social History     Tobacco Use    Smoking status: Former     Years: 8.00     Types: Cigarettes     Quit date: 1990     Years since quittin.2    Smokeless tobacco: Never   Vaping Use    Vaping Use: Never used   Substance Use Topics    Alcohol use: No    Drug use: No       FH: Reviewed with patient  Family History   Problem Relation Age of Onset    Stroke Maternal Grandmother     Hypertension Mother     Stroke Mother     Hypertension Brother     Stroke Brother        Current medical providers:    Patient Care Team:  Vanna Gonzalez MD as PCP - General (Internal Medicine Physician)  Vanna Gonzalez [facility] P[facility]  Nenita Kemp MD as Consulting Provider (Orthopedic Surgery)  Sandeep Pritchett MD (Family Medicine)  Milton Lucio NP (Nurse Practitioner)    Plan:    Diagnoses and all orders for this visit:    Medicare annual wellness visit, subsequent  . Age appropriate Health screening and immunization discussed with patient. She does not want Breast and colon screening anymore. Health Maintenance   Topic Date Due    Medicare Yearly Exam  2023    Depression Screen  2023    DTaP/Tdap/Td series (3 - Td or Tdap) 2025    Bone Densitometry (Dexa) Screening  Completed    Shingles Vaccine  Completed    Flu Vaccine  Completed    COVID-19 Vaccine  Completed    Pneumococcal 65+ years  Completed          Urinary/ Fecal Incontinence: No    Regular physical exercise: Not much lately. Patient verbalized understanding of information presented. AVS and Medicare Part B Preventive Services Table printed and given to pt and reviewed. See table for findings under Recommendation and Scheduled.  All of the patient's questions were answered.

## 2023-03-22 ENCOUNTER — TELEPHONE (OUTPATIENT)
Dept: PRIMARY CARE CLINIC | Age: 83
End: 2023-03-22

## 2023-03-22 DIAGNOSIS — M17.10 ARTHRITIS OF KNEE: Primary | ICD-10-CM

## 2023-03-22 RX ORDER — DICLOFENAC SODIUM 10 MG/G
2 GEL TOPICAL 4 TIMES DAILY
Qty: 100 G | Refills: 2 | Status: SHIPPED | OUTPATIENT
Start: 2023-03-22 | End: 2023-04-21

## 2023-05-12 DIAGNOSIS — J30.89 OTHER ALLERGIC RHINITIS: Primary | ICD-10-CM

## 2023-05-12 RX ORDER — MONTELUKAST SODIUM 10 MG/1
TABLET ORAL
Qty: 90 TABLET | Refills: 0 | Status: SHIPPED | OUTPATIENT
Start: 2023-05-12

## 2023-05-30 DIAGNOSIS — K21.9 GASTROESOPHAGEAL REFLUX DISEASE WITHOUT ESOPHAGITIS: Primary | ICD-10-CM

## 2023-05-30 RX ORDER — FAMOTIDINE 40 MG/1
TABLET, FILM COATED ORAL DAILY
Qty: 90 TABLET | Refills: 0 | Status: SHIPPED | OUTPATIENT
Start: 2023-05-30

## 2023-06-23 RX ORDER — LEVOCETIRIZINE DIHYDROCHLORIDE 5 MG/1
TABLET, FILM COATED ORAL
Qty: 90 TABLET | Refills: 0 | Status: SHIPPED | OUTPATIENT
Start: 2023-06-23

## 2023-06-23 NOTE — TELEPHONE ENCOUNTER
PCP: José Boggs MD    Last Visit 3/21/2023   No future appointments.     Requested Prescriptions     Pending Prescriptions Disp Refills    levocetirizine (XYZAL) 5 MG tablet [Pharmacy Med Name: LEVOCETIRIZINE 5MG TABLETS] 90 tablet      Sig: TAKE 1 TABLET BY MOUTH DAILY         Other Comments: Last Refill

## 2023-07-04 RX ORDER — LOSARTAN POTASSIUM 100 MG/1
TABLET ORAL
Qty: 90 TABLET | Refills: 0 | Status: SHIPPED | OUTPATIENT
Start: 2023-07-04

## 2023-07-05 RX ORDER — SPIRONOLACTONE 25 MG/1
TABLET ORAL
Qty: 60 TABLET | Refills: 0 | Status: SHIPPED | OUTPATIENT
Start: 2023-07-05

## 2023-08-04 RX ORDER — SPIRONOLACTONE 25 MG/1
TABLET ORAL
Qty: 180 TABLET | OUTPATIENT
Start: 2023-08-04

## 2023-08-08 RX ORDER — SPIRONOLACTONE 25 MG/1
TABLET ORAL
Qty: 180 TABLET | Refills: 0 | Status: SHIPPED | OUTPATIENT
Start: 2023-08-08

## 2023-08-09 DIAGNOSIS — J30.89 OTHER ALLERGIC RHINITIS: ICD-10-CM

## 2023-08-09 RX ORDER — MONTELUKAST SODIUM 10 MG/1
10 TABLET ORAL DAILY
Qty: 90 TABLET | Refills: 0 | Status: SHIPPED | OUTPATIENT
Start: 2023-08-09

## 2023-08-09 NOTE — TELEPHONE ENCOUNTER
----- Message from Elsie Diazgianna sent at 8/7/2023 11:53 AM EDT -----  Subject: Refill Request    QUESTIONS  Name of Medication? spironolactone (ALDACTONE) 25 MG tablet  Patient-reported dosage and instructions? 25mg tablet, TAKE 2 TABLETS BY   MOUTH DAILY  How many days do you have left? 0  [id number]  Pharmacy phone number (if available)? [phone]  Additional Information for Provider? Patient is all out of medication and   needs us to get a refill over to her pharmacy asap. She is currently   scheduled to come in for a refill f/u appointment on 8/22. Please contact   the patient once the prescription has been sent over to the pharmacy.   ---------------------------------------------------------------------------  --------------,  Name of Medication? montelukast (SINGULAIR) 10 MG tablet  Patient-reported dosage and instructions? 10mg tablet, TAKE 1 TABLET BY   MOUTH EVERY DAY  How many days do you have left? 2  [id number]  Pharmacy phone number (if available)? [phone]  ---------------------------------------------------------------------------  --------------  CALL BACK INFO  What is the best way for the office to contact you? OK to leave message on   voicemail  Preferred Call Back Phone Number? [phone]  ---------------------------------------------------------------------------  --------------  SCRIPT ANSWERS  Relationship to Patient?  Self

## 2023-08-31 DIAGNOSIS — J30.89 SEASONAL ALLERGIC RHINITIS DUE TO OTHER ALLERGIC TRIGGER: ICD-10-CM

## 2023-08-31 DIAGNOSIS — I10 HTN (HYPERTENSION), BENIGN: Primary | ICD-10-CM

## 2023-08-31 DIAGNOSIS — K21.9 GASTROESOPHAGEAL REFLUX DISEASE WITHOUT ESOPHAGITIS: ICD-10-CM

## 2023-08-31 RX ORDER — FLUTICASONE PROPIONATE 50 MCG
SPRAY, SUSPENSION (ML) NASAL
Qty: 16 G | Refills: 0 | Status: SHIPPED | OUTPATIENT
Start: 2023-08-31

## 2023-08-31 RX ORDER — FAMOTIDINE 40 MG/1
TABLET, FILM COATED ORAL DAILY
Qty: 90 TABLET | Refills: 0 | Status: SHIPPED | OUTPATIENT
Start: 2023-08-31

## 2023-08-31 RX ORDER — PRAZOSIN HYDROCHLORIDE 1 MG/1
CAPSULE ORAL
Qty: 180 CAPSULE | Refills: 0 | OUTPATIENT
Start: 2023-08-31

## 2023-08-31 RX ORDER — PRAZOSIN HYDROCHLORIDE 1 MG/1
CAPSULE ORAL
Qty: 180 CAPSULE | OUTPATIENT
Start: 2023-08-31

## 2023-09-06 DIAGNOSIS — I10 HTN (HYPERTENSION), BENIGN: Primary | ICD-10-CM

## 2023-09-06 RX ORDER — PRAZOSIN HYDROCHLORIDE 1 MG/1
1 CAPSULE ORAL 2 TIMES DAILY
Qty: 30 CAPSULE | Refills: 0 | Status: SHIPPED | OUTPATIENT
Start: 2023-09-06

## 2023-09-06 NOTE — TELEPHONE ENCOUNTER
----- Message from Donatosang Mejía sent at 9/6/2023 10:43 AM EDT -----  Subject: Refill Request    QUESTIONS  Name of Medication? prazosin (MINIPRESS) 1 MG capsule  Patient-reported dosage and instructions? One tab two time a day   How many days do you have left? 2  [id number]  Pharmacy phone number (if available)? [phone]  Additional Information for Provider? Pt has upcoming appt but will run out   of medication before appt on 9/18/2023   ---------------------------------------------------------------------------  --------------  CALL BACK INFO  What is the best way for the office to contact you? OK to leave message on   voicemail  Preferred Call Back Phone Number? [phone]  ---------------------------------------------------------------------------  --------------  SCRIPT ANSWERS  Relationship to Patient?  Self

## 2023-09-18 ENCOUNTER — OFFICE VISIT (OUTPATIENT)
Dept: PRIMARY CARE CLINIC | Facility: CLINIC | Age: 83
End: 2023-09-18
Payer: COMMERCIAL

## 2023-09-18 VITALS
OXYGEN SATURATION: 98 % | SYSTOLIC BLOOD PRESSURE: 158 MMHG | DIASTOLIC BLOOD PRESSURE: 88 MMHG | BODY MASS INDEX: 31.35 KG/M2 | HEART RATE: 83 BPM | TEMPERATURE: 97.1 F | RESPIRATION RATE: 20 BRPM | WEIGHT: 219 LBS | HEIGHT: 70 IN

## 2023-09-18 DIAGNOSIS — J30.89 ENVIRONMENTAL AND SEASONAL ALLERGIES: ICD-10-CM

## 2023-09-18 DIAGNOSIS — I10 PRIMARY HYPERTENSION: Primary | ICD-10-CM

## 2023-09-18 DIAGNOSIS — H92.02 LEFT EAR PAIN: ICD-10-CM

## 2023-09-18 DIAGNOSIS — I10 PRIMARY HYPERTENSION: ICD-10-CM

## 2023-09-18 DIAGNOSIS — F51.5 NIGHTMARES: ICD-10-CM

## 2023-09-18 PROCEDURE — 1036F TOBACCO NON-USER: CPT | Performed by: INTERNAL MEDICINE

## 2023-09-18 PROCEDURE — 1123F ACP DISCUSS/DSCN MKR DOCD: CPT | Performed by: INTERNAL MEDICINE

## 2023-09-18 PROCEDURE — 99213 OFFICE O/P EST LOW 20 MIN: CPT | Performed by: INTERNAL MEDICINE

## 2023-09-18 PROCEDURE — 1090F PRES/ABSN URINE INCON ASSESS: CPT | Performed by: INTERNAL MEDICINE

## 2023-09-18 PROCEDURE — G8427 DOCREV CUR MEDS BY ELIG CLIN: HCPCS | Performed by: INTERNAL MEDICINE

## 2023-09-18 PROCEDURE — 3079F DIAST BP 80-89 MM HG: CPT | Performed by: INTERNAL MEDICINE

## 2023-09-18 PROCEDURE — G8419 CALC BMI OUT NRM PARAM NOF/U: HCPCS | Performed by: INTERNAL MEDICINE

## 2023-09-18 PROCEDURE — G8399 PT W/DXA RESULTS DOCUMENT: HCPCS | Performed by: INTERNAL MEDICINE

## 2023-09-18 PROCEDURE — 3077F SYST BP >= 140 MM HG: CPT | Performed by: INTERNAL MEDICINE

## 2023-09-18 RX ORDER — PRAZOSIN HYDROCHLORIDE 1 MG/1
1 CAPSULE ORAL 2 TIMES DAILY
Qty: 30 CAPSULE | Refills: 2 | Status: SHIPPED | OUTPATIENT
Start: 2023-09-18

## 2023-09-18 RX ORDER — LOSARTAN POTASSIUM 100 MG/1
100 TABLET ORAL DAILY
Qty: 90 TABLET | Refills: 0 | Status: SHIPPED | OUTPATIENT
Start: 2023-09-18 | End: 2023-12-17

## 2023-09-18 RX ORDER — LOSARTAN POTASSIUM 100 MG/1
100 TABLET ORAL DAILY
Qty: 90 TABLET | Refills: 0 | Status: CANCELLED | OUTPATIENT
Start: 2023-09-18 | End: 2023-12-17

## 2023-09-18 RX ORDER — LEVOCETIRIZINE DIHYDROCHLORIDE 5 MG/1
5 TABLET, FILM COATED ORAL DAILY
Qty: 90 TABLET | Refills: 0 | Status: SHIPPED | OUTPATIENT
Start: 2023-09-18 | End: 2023-12-17

## 2023-09-18 RX ORDER — PRAZOSIN HYDROCHLORIDE 1 MG/1
1 CAPSULE ORAL 2 TIMES DAILY
Qty: 180 CAPSULE | Refills: 0 | Status: CANCELLED | OUTPATIENT
Start: 2023-09-18 | End: 2023-12-17

## 2023-09-18 RX ORDER — LEVOCETIRIZINE DIHYDROCHLORIDE 5 MG/1
5 TABLET, FILM COATED ORAL DAILY
Qty: 90 TABLET | Refills: 0 | Status: CANCELLED | OUTPATIENT
Start: 2023-09-18

## 2023-09-18 SDOH — ECONOMIC STABILITY: FOOD INSECURITY: WITHIN THE PAST 12 MONTHS, YOU WORRIED THAT YOUR FOOD WOULD RUN OUT BEFORE YOU GOT MONEY TO BUY MORE.: NEVER TRUE

## 2023-09-18 SDOH — ECONOMIC STABILITY: FOOD INSECURITY: WITHIN THE PAST 12 MONTHS, THE FOOD YOU BOUGHT JUST DIDN'T LAST AND YOU DIDN'T HAVE MONEY TO GET MORE.: NEVER TRUE

## 2023-09-18 SDOH — ECONOMIC STABILITY: HOUSING INSECURITY
IN THE LAST 12 MONTHS, WAS THERE A TIME WHEN YOU DID NOT HAVE A STEADY PLACE TO SLEEP OR SLEPT IN A SHELTER (INCLUDING NOW)?: NO

## 2023-09-18 SDOH — ECONOMIC STABILITY: INCOME INSECURITY: HOW HARD IS IT FOR YOU TO PAY FOR THE VERY BASICS LIKE FOOD, HOUSING, MEDICAL CARE, AND HEATING?: NOT HARD AT ALL

## 2023-09-18 ASSESSMENT — ENCOUNTER SYMPTOMS
DIARRHEA: 0
ABDOMINAL PAIN: 0
RHINORRHEA: 0
COUGH: 0
SHORTNESS OF BREATH: 0
BACK PAIN: 0
CHEST TIGHTNESS: 0
CONSTIPATION: 0
COLOR CHANGE: 0
EYE DISCHARGE: 0
SORE THROAT: 0

## 2023-09-18 ASSESSMENT — PATIENT HEALTH QUESTIONNAIRE - PHQ9
1. LITTLE INTEREST OR PLEASURE IN DOING THINGS: 0
SUM OF ALL RESPONSES TO PHQ QUESTIONS 1-9: 0
SUM OF ALL RESPONSES TO PHQ QUESTIONS 1-9: 0
2. FEELING DOWN, DEPRESSED OR HOPELESS: 0
SUM OF ALL RESPONSES TO PHQ QUESTIONS 1-9: 0
SUM OF ALL RESPONSES TO PHQ QUESTIONS 1-9: 0
SUM OF ALL RESPONSES TO PHQ9 QUESTIONS 1 & 2: 0

## 2023-09-18 NOTE — PROGRESS NOTES
Imer Beard (:  1940) is a 79 y.o. female, Established patient, here for evaluation of the following chief complaint(s):  Follow-up (Med refills.)           ASSESSMENT/PLAN:  1. Primary hypertension  -     losartan (COZAAR) 100 MG tablet; Take 1 tablet by mouth daily, Disp-90 tablet, R-0Normal sent to pharmacy. -     Comprehensive Metabolic Panel; Future  I refilled her Losartan 100mg daily medication. I ordered a CMP. 2. Left ear pain  Most likely due to allergies. If pain doesn`t improve with allergy shots then needs to see an ENT. 3. Environmental and seasonal allergies  -     levocetirizine (XYZAL) 5 MG tablet; Take 1 tablet by mouth daily, Disp-90 tablet, R-0Normal sent to pharmacy. I refilled her Xyzal 5mg daily medication. I ordered blood work for today. She is going to make an appointment with allergist/ immunologist and is going to take allergy shots. 4. Nightmares  Recommended stopping Singular and continuing prazosin. Subjective   SUBJECTIVE/OBJECTIVE:  HPI    Patient presents today for follow-up, left ear pain, and medication refills. She also is experiencing left ear pain. She is also coughing for 3-4 days intermittently. Gets allergies this time of year. Once taking shots, this summer she states that her allergies have been  even more excacerbated. She is taking Losartan 100mg daily, Spironolactone 25mg 2 tablets daily, and Amlodipine 10mg daily. She is also taking prazosin 1mg BID, which is effective. She measures her own blood pressure at home and says that her blood pressure was lower yesterday. She said that her blood pressure could be higher from being rushed earlier. She states that she sleeps good. She has not seen the cardiologist since last year. She states she drinks a lot of water. She is taking Flonase 50mcg/act 1 spray daily, Xyzal 5mg daily, Sigulair 10mg daily but says that it is not effective, and Astelin BID.      She is taking Voltaren 1% gel 2g

## 2023-09-19 LAB
ALBUMIN SERPL-MCNC: 4 G/DL (ref 3.5–5)
ALBUMIN/GLOB SERPL: 1.3 (ref 1.1–2.2)
ALP SERPL-CCNC: 90 U/L (ref 45–117)
ALT SERPL-CCNC: 21 U/L (ref 12–78)
ANION GAP SERPL CALC-SCNC: 4 MMOL/L (ref 5–15)
AST SERPL-CCNC: 17 U/L (ref 15–37)
BILIRUB SERPL-MCNC: 0.7 MG/DL (ref 0.2–1)
BUN SERPL-MCNC: 13 MG/DL (ref 6–20)
BUN/CREAT SERPL: 13 (ref 12–20)
CALCIUM SERPL-MCNC: 9.5 MG/DL (ref 8.5–10.1)
CHLORIDE SERPL-SCNC: 105 MMOL/L (ref 97–108)
CO2 SERPL-SCNC: 30 MMOL/L (ref 21–32)
CREAT SERPL-MCNC: 0.99 MG/DL (ref 0.55–1.02)
GLOBULIN SER CALC-MCNC: 3.2 G/DL (ref 2–4)
GLUCOSE SERPL-MCNC: 86 MG/DL (ref 65–100)
POTASSIUM SERPL-SCNC: 4.5 MMOL/L (ref 3.5–5.1)
PROT SERPL-MCNC: 7.2 G/DL (ref 6.4–8.2)
SODIUM SERPL-SCNC: 139 MMOL/L (ref 136–145)

## 2023-10-18 DIAGNOSIS — I15.9 SECONDARY HYPERTENSION: Primary | ICD-10-CM

## 2023-10-18 RX ORDER — AMLODIPINE BESYLATE 10 MG/1
10 TABLET ORAL DAILY
Qty: 90 TABLET | Refills: 0 | Status: SHIPPED | OUTPATIENT
Start: 2023-10-18

## 2023-10-31 RX ORDER — PRAZOSIN HYDROCHLORIDE 1 MG/1
1 CAPSULE ORAL 2 TIMES DAILY
Qty: 30 CAPSULE | Refills: 2 | Status: SHIPPED | OUTPATIENT
Start: 2023-10-31

## 2023-10-31 RX ORDER — SPIRONOLACTONE 25 MG/1
TABLET ORAL
Qty: 180 TABLET | Refills: 0 | Status: SHIPPED | OUTPATIENT
Start: 2023-10-31

## 2023-11-14 DIAGNOSIS — J30.89 OTHER ALLERGIC RHINITIS: ICD-10-CM

## 2023-11-14 DIAGNOSIS — J30.89 ENVIRONMENTAL AND SEASONAL ALLERGIES: ICD-10-CM

## 2023-11-14 DIAGNOSIS — J30.89 SEASONAL ALLERGIC RHINITIS DUE TO OTHER ALLERGIC TRIGGER: ICD-10-CM

## 2023-11-14 RX ORDER — LEVOCETIRIZINE DIHYDROCHLORIDE 5 MG/1
5 TABLET, FILM COATED ORAL DAILY
Qty: 90 TABLET | Refills: 0 | Status: SHIPPED | OUTPATIENT
Start: 2023-11-14 | End: 2024-02-12

## 2023-11-14 RX ORDER — FLUTICASONE PROPIONATE 50 MCG
SPRAY, SUSPENSION (ML) NASAL
Qty: 16 G | Refills: 0 | Status: SHIPPED | OUTPATIENT
Start: 2023-11-14

## 2023-11-14 RX ORDER — MONTELUKAST SODIUM 10 MG/1
10 TABLET ORAL DAILY
Qty: 90 TABLET | Refills: 0 | Status: SHIPPED | OUTPATIENT
Start: 2023-11-14

## 2023-11-28 DIAGNOSIS — K21.9 GASTROESOPHAGEAL REFLUX DISEASE WITHOUT ESOPHAGITIS: ICD-10-CM

## 2023-11-28 RX ORDER — FAMOTIDINE 40 MG/1
TABLET, FILM COATED ORAL DAILY
Qty: 90 TABLET | Refills: 0 | Status: SHIPPED | OUTPATIENT
Start: 2023-11-28

## 2024-01-15 DIAGNOSIS — I15.9 SECONDARY HYPERTENSION: ICD-10-CM

## 2024-01-15 RX ORDER — AMLODIPINE BESYLATE 10 MG/1
10 TABLET ORAL DAILY
Qty: 90 TABLET | Refills: 0 | Status: SHIPPED | OUTPATIENT
Start: 2024-01-15

## 2024-01-30 DIAGNOSIS — I15.9 SECONDARY HYPERTENSION: Primary | ICD-10-CM

## 2024-01-30 RX ORDER — SPIRONOLACTONE 25 MG/1
TABLET ORAL
Qty: 180 TABLET | Refills: 0 | Status: SHIPPED | OUTPATIENT
Start: 2024-01-30

## 2024-02-22 DIAGNOSIS — K21.9 GASTROESOPHAGEAL REFLUX DISEASE WITHOUT ESOPHAGITIS: ICD-10-CM

## 2024-02-22 RX ORDER — FAMOTIDINE 40 MG/1
TABLET, FILM COATED ORAL DAILY
Qty: 90 TABLET | Refills: 0 | Status: SHIPPED | OUTPATIENT
Start: 2024-02-22

## 2024-03-19 DIAGNOSIS — J30.89 SEASONAL ALLERGIC RHINITIS DUE TO OTHER ALLERGIC TRIGGER: ICD-10-CM

## 2024-03-19 RX ORDER — PRAZOSIN HYDROCHLORIDE 1 MG/1
1 CAPSULE ORAL 2 TIMES DAILY
Qty: 180 CAPSULE | Refills: 0 | Status: SHIPPED | OUTPATIENT
Start: 2024-03-19

## 2024-03-19 RX ORDER — FLUTICASONE PROPIONATE 50 MCG
SPRAY, SUSPENSION (ML) NASAL
Qty: 16 G | Refills: 0 | Status: SHIPPED | OUTPATIENT
Start: 2024-03-19

## 2024-03-19 NOTE — TELEPHONE ENCOUNTER
----- Message from Elizabeth Escobedo sent at 3/19/2024 10:29 AM EDT -----  Subject: Appointment Request    Reason for Call: Established Patient Appointment needed: Semi-Routine   Cough, Cold Symptoms    QUESTIONS    Reason for appointment request? No appointments available during search     Additional Information for Provider? [facility] is having her allergies   [facility] to [facility]   ---------------------------------------------------------------------------  --------------  CALL BACK INFO  [phone]; OK to leave message on voicemail  ---------------------------------------------------------------------------  --------------  SCRIPT ANSWERS

## 2024-03-27 DIAGNOSIS — J30.89 ENVIRONMENTAL AND SEASONAL ALLERGIES: ICD-10-CM

## 2024-03-27 DIAGNOSIS — I10 PRIMARY HYPERTENSION: ICD-10-CM

## 2024-03-27 RX ORDER — LEVOCETIRIZINE DIHYDROCHLORIDE 5 MG/1
5 TABLET, FILM COATED ORAL DAILY
Qty: 90 TABLET | Refills: 0 | Status: SHIPPED | OUTPATIENT
Start: 2024-03-27 | End: 2024-06-25

## 2024-03-27 RX ORDER — LOSARTAN POTASSIUM 100 MG/1
100 TABLET ORAL DAILY
Qty: 90 TABLET | Refills: 0 | Status: SHIPPED | OUTPATIENT
Start: 2024-03-27 | End: 2024-06-25

## 2024-04-13 DIAGNOSIS — I15.9 SECONDARY HYPERTENSION: ICD-10-CM

## 2024-04-14 RX ORDER — AMLODIPINE BESYLATE 10 MG/1
10 TABLET ORAL DAILY
Qty: 90 TABLET | Refills: 0 | Status: SHIPPED | OUTPATIENT
Start: 2024-04-14

## 2024-05-01 DIAGNOSIS — I15.9 SECONDARY HYPERTENSION: ICD-10-CM

## 2024-05-02 RX ORDER — SPIRONOLACTONE 25 MG/1
TABLET ORAL
Qty: 180 TABLET | Refills: 0 | Status: SHIPPED | OUTPATIENT
Start: 2024-05-02

## 2024-05-24 DIAGNOSIS — K21.9 GASTROESOPHAGEAL REFLUX DISEASE WITHOUT ESOPHAGITIS: ICD-10-CM

## 2024-05-24 RX ORDER — FAMOTIDINE 40 MG/1
TABLET, FILM COATED ORAL DAILY
Qty: 90 TABLET | Refills: 0 | Status: SHIPPED | OUTPATIENT
Start: 2024-05-24

## 2024-06-19 DIAGNOSIS — I10 PRIMARY HYPERTENSION: ICD-10-CM

## 2024-06-19 DIAGNOSIS — J30.89 ENVIRONMENTAL AND SEASONAL ALLERGIES: ICD-10-CM

## 2024-06-20 DIAGNOSIS — F51.5 NIGHTMARES: Primary | ICD-10-CM

## 2024-06-20 DIAGNOSIS — J30.89 SEASONAL ALLERGIC RHINITIS DUE TO OTHER ALLERGIC TRIGGER: ICD-10-CM

## 2024-06-20 RX ORDER — FLUTICASONE PROPIONATE 50 MCG
SPRAY, SUSPENSION (ML) NASAL
Qty: 16 G | Refills: 0 | Status: SHIPPED | OUTPATIENT
Start: 2024-06-20

## 2024-06-20 RX ORDER — PRAZOSIN HYDROCHLORIDE 1 MG/1
1 CAPSULE ORAL 2 TIMES DAILY
Qty: 60 CAPSULE | Refills: 0 | Status: SHIPPED | OUTPATIENT
Start: 2024-06-20

## 2024-06-23 RX ORDER — LEVOCETIRIZINE DIHYDROCHLORIDE 5 MG/1
5 TABLET, FILM COATED ORAL DAILY
Qty: 90 TABLET | Refills: 0 | Status: SHIPPED | OUTPATIENT
Start: 2024-06-23 | End: 2024-09-21

## 2024-06-23 RX ORDER — LOSARTAN POTASSIUM 100 MG/1
100 TABLET ORAL DAILY
Qty: 90 TABLET | Refills: 0 | Status: SHIPPED | OUTPATIENT
Start: 2024-06-23 | End: 2024-09-21

## 2024-06-26 DIAGNOSIS — F51.5 NIGHTMARES: ICD-10-CM

## 2024-06-26 RX ORDER — PRAZOSIN HYDROCHLORIDE 1 MG/1
1 CAPSULE ORAL 2 TIMES DAILY
Qty: 180 CAPSULE | Refills: 0 | Status: SHIPPED | OUTPATIENT
Start: 2024-06-26 | End: 2024-09-24

## 2024-06-26 NOTE — TELEPHONE ENCOUNTER
Patient says she was supposed to get 180 pills of Prazosin.  Can Dr Fox please fix that for her?

## 2024-07-03 ENCOUNTER — TELEPHONE (OUTPATIENT)
Dept: PRIMARY CARE CLINIC | Facility: CLINIC | Age: 84
End: 2024-07-03

## 2024-07-03 NOTE — TELEPHONE ENCOUNTER
Discussed with the patient that the 30 day supply was sent while Dr Fox was gone, since then we have sent a 90 day for her, when out of this current script then she can  new order, she said thank you!

## 2024-07-03 NOTE — TELEPHONE ENCOUNTER
We prescribed 180 capsules of prazosin 1 mg (90-day), but the pharmacy gave them only 60 capsules (30-day). Patient was instructed to call us and request we send another order for the remaining 120 capsules.    [id number] - [place], [place] - [address] - P [phone] - F [phone]

## 2024-07-05 DIAGNOSIS — I15.9 SECONDARY HYPERTENSION: ICD-10-CM

## 2024-07-07 RX ORDER — AMLODIPINE BESYLATE 10 MG/1
10 TABLET ORAL DAILY
Qty: 90 TABLET | Refills: 0 | Status: SHIPPED | OUTPATIENT
Start: 2024-07-07

## 2024-07-30 DIAGNOSIS — I15.9 SECONDARY HYPERTENSION: ICD-10-CM

## 2024-07-31 RX ORDER — SPIRONOLACTONE 25 MG/1
TABLET ORAL
Qty: 180 TABLET | Refills: 0 | Status: SHIPPED | OUTPATIENT
Start: 2024-07-31

## 2024-08-23 DIAGNOSIS — K21.9 GASTROESOPHAGEAL REFLUX DISEASE WITHOUT ESOPHAGITIS: ICD-10-CM

## 2024-08-23 DIAGNOSIS — J30.89 SEASONAL ALLERGIC RHINITIS DUE TO OTHER ALLERGIC TRIGGER: ICD-10-CM

## 2024-08-23 NOTE — TELEPHONE ENCOUNTER
Requested Prescriptions     Pending Prescriptions Disp Refills    fluticasone (FLONASE) 50 MCG/ACT nasal spray [Pharmacy Med Name: FLUTICASONE PROP 50 MCG SPRAY] 1 each 2     Si spray by Nasal route daily    famotidine (PEPCID) 40 MG tablet [Pharmacy Med Name: FAMOTIDINE 40 MG TABLET] 90 tablet 0     Sig: Take 1 tablet by mouth daily        Last Visit 23  Last Refill

## 2024-08-24 RX ORDER — FAMOTIDINE 40 MG/1
40 TABLET, FILM COATED ORAL DAILY
Qty: 90 TABLET | Refills: 0 | Status: SHIPPED | OUTPATIENT
Start: 2024-08-24 | End: 2024-11-22

## 2024-08-24 RX ORDER — FLUTICASONE PROPIONATE 50 MCG
1 SPRAY, SUSPENSION (ML) NASAL DAILY
Qty: 1 EACH | Refills: 2 | Status: SHIPPED | OUTPATIENT
Start: 2024-08-24

## 2024-09-26 ENCOUNTER — TELEPHONE (OUTPATIENT)
Dept: OTHER | Facility: CLINIC | Age: 84
End: 2024-09-26

## 2024-09-26 DIAGNOSIS — I10 PRIMARY HYPERTENSION: ICD-10-CM

## 2024-09-26 RX ORDER — LOSARTAN POTASSIUM 100 MG/1
100 TABLET ORAL DAILY
Qty: 90 TABLET | Refills: 0 | Status: SHIPPED | OUTPATIENT
Start: 2024-09-26 | End: 2024-12-25

## 2024-09-30 DIAGNOSIS — J30.9 ALLERGIC RHINITIS, UNSPECIFIED SEASONALITY, UNSPECIFIED TRIGGER: Primary | ICD-10-CM

## 2024-09-30 RX ORDER — LEVOCETIRIZINE DIHYDROCHLORIDE 5 MG/1
5 TABLET, FILM COATED ORAL DAILY
Qty: 90 TABLET | Refills: 0 | Status: SHIPPED | OUTPATIENT
Start: 2024-09-30

## 2024-10-11 DIAGNOSIS — F51.5 NIGHTMARES: ICD-10-CM

## 2024-10-11 RX ORDER — PRAZOSIN HYDROCHLORIDE 1 MG/1
1 CAPSULE ORAL 2 TIMES DAILY
Qty: 60 CAPSULE | Refills: 0 | Status: SHIPPED | OUTPATIENT
Start: 2024-10-11

## 2024-10-13 DIAGNOSIS — I15.9 SECONDARY HYPERTENSION: ICD-10-CM

## 2024-10-13 RX ORDER — AMLODIPINE BESYLATE 10 MG/1
10 TABLET ORAL DAILY
Qty: 30 TABLET | Refills: 0 | Status: SHIPPED | OUTPATIENT
Start: 2024-10-13

## 2024-10-26 DIAGNOSIS — I15.9 SECONDARY HYPERTENSION: ICD-10-CM

## 2024-10-26 RX ORDER — SPIRONOLACTONE 25 MG/1
TABLET ORAL
Qty: 60 TABLET | Refills: 0 | Status: SHIPPED | OUTPATIENT
Start: 2024-10-26

## 2024-11-11 DIAGNOSIS — I15.9 SECONDARY HYPERTENSION: ICD-10-CM

## 2024-11-11 DIAGNOSIS — F51.5 NIGHTMARES: ICD-10-CM

## 2024-11-11 RX ORDER — AMLODIPINE BESYLATE 10 MG/1
10 TABLET ORAL DAILY
Qty: 30 TABLET | Refills: 0 | Status: SHIPPED | OUTPATIENT
Start: 2024-11-11

## 2024-11-11 RX ORDER — PRAZOSIN HYDROCHLORIDE 1 MG/1
1 CAPSULE ORAL 2 TIMES DAILY
Qty: 60 CAPSULE | Refills: 0 | Status: SHIPPED | OUTPATIENT
Start: 2024-11-11

## 2024-11-27 DIAGNOSIS — I15.9 SECONDARY HYPERTENSION: ICD-10-CM

## 2024-11-27 DIAGNOSIS — K21.9 GASTROESOPHAGEAL REFLUX DISEASE WITHOUT ESOPHAGITIS: ICD-10-CM

## 2024-12-01 RX ORDER — SPIRONOLACTONE 25 MG/1
TABLET ORAL
Qty: 60 TABLET | Refills: 0 | Status: SHIPPED | OUTPATIENT
Start: 2024-12-01

## 2024-12-01 RX ORDER — FAMOTIDINE 40 MG/1
40 TABLET, FILM COATED ORAL DAILY
Qty: 90 TABLET | Refills: 0 | Status: SHIPPED | OUTPATIENT
Start: 2024-12-01 | End: 2025-03-01

## 2024-12-14 DIAGNOSIS — F51.5 NIGHTMARES: ICD-10-CM

## 2024-12-14 DIAGNOSIS — I15.9 SECONDARY HYPERTENSION: ICD-10-CM

## 2024-12-14 DIAGNOSIS — J30.9 ALLERGIC RHINITIS, UNSPECIFIED SEASONALITY, UNSPECIFIED TRIGGER: ICD-10-CM

## 2024-12-14 DIAGNOSIS — I10 PRIMARY HYPERTENSION: ICD-10-CM

## 2024-12-15 RX ORDER — LEVOCETIRIZINE DIHYDROCHLORIDE 5 MG/1
5 TABLET, FILM COATED ORAL DAILY
Qty: 30 TABLET | Refills: 0 | Status: SHIPPED | OUTPATIENT
Start: 2024-12-15

## 2024-12-15 RX ORDER — SPIRONOLACTONE 25 MG/1
TABLET ORAL
Qty: 60 TABLET | Refills: 0 | Status: SHIPPED | OUTPATIENT
Start: 2024-12-15

## 2024-12-15 RX ORDER — PRAZOSIN HYDROCHLORIDE 1 MG/1
1 CAPSULE ORAL 2 TIMES DAILY
Qty: 60 CAPSULE | Refills: 0 | Status: SHIPPED | OUTPATIENT
Start: 2024-12-15

## 2024-12-15 RX ORDER — AMLODIPINE BESYLATE 10 MG/1
10 TABLET ORAL DAILY
Qty: 30 TABLET | Refills: 0 | Status: SHIPPED | OUTPATIENT
Start: 2024-12-15

## 2024-12-15 RX ORDER — LOSARTAN POTASSIUM 100 MG/1
100 TABLET ORAL DAILY
Qty: 30 TABLET | Refills: 0 | Status: SHIPPED | OUTPATIENT
Start: 2024-12-15 | End: 2025-01-14

## 2025-01-14 DIAGNOSIS — F51.5 NIGHTMARES: ICD-10-CM

## 2025-01-14 DIAGNOSIS — I10 PRIMARY HYPERTENSION: ICD-10-CM

## 2025-01-14 DIAGNOSIS — I15.9 SECONDARY HYPERTENSION: ICD-10-CM

## 2025-01-15 RX ORDER — PRAZOSIN HYDROCHLORIDE 1 MG/1
1 CAPSULE ORAL 2 TIMES DAILY
Qty: 60 CAPSULE | Refills: 0 | Status: SHIPPED | OUTPATIENT
Start: 2025-01-15

## 2025-01-15 RX ORDER — AMLODIPINE BESYLATE 10 MG/1
10 TABLET ORAL DAILY
Qty: 30 TABLET | Refills: 0 | Status: SHIPPED | OUTPATIENT
Start: 2025-01-15

## 2025-01-15 RX ORDER — LOSARTAN POTASSIUM 100 MG/1
100 TABLET ORAL DAILY
Qty: 30 TABLET | Refills: 0 | Status: SHIPPED | OUTPATIENT
Start: 2025-01-15 | End: 2025-02-14

## 2025-01-23 DIAGNOSIS — I15.9 SECONDARY HYPERTENSION: ICD-10-CM

## 2025-01-23 RX ORDER — SPIRONOLACTONE 25 MG/1
TABLET ORAL
Qty: 60 TABLET | Refills: 0 | Status: SHIPPED | OUTPATIENT
Start: 2025-01-23

## 2025-02-12 DIAGNOSIS — J30.9 ALLERGIC RHINITIS, UNSPECIFIED SEASONALITY, UNSPECIFIED TRIGGER: ICD-10-CM

## 2025-02-12 DIAGNOSIS — I15.9 SECONDARY HYPERTENSION: ICD-10-CM

## 2025-02-12 RX ORDER — LEVOCETIRIZINE DIHYDROCHLORIDE 5 MG/1
5 TABLET, FILM COATED ORAL DAILY
Qty: 30 TABLET | Refills: 0 | Status: SHIPPED | OUTPATIENT
Start: 2025-02-12

## 2025-02-12 RX ORDER — AMLODIPINE BESYLATE 10 MG/1
10 TABLET ORAL DAILY
Qty: 30 TABLET | Refills: 0 | Status: SHIPPED | OUTPATIENT
Start: 2025-02-12

## 2025-02-14 DIAGNOSIS — I10 PRIMARY HYPERTENSION: ICD-10-CM

## 2025-02-14 DIAGNOSIS — F51.5 NIGHTMARES: ICD-10-CM

## 2025-02-14 RX ORDER — PRAZOSIN HYDROCHLORIDE 1 MG/1
1 CAPSULE ORAL 2 TIMES DAILY
Qty: 30 CAPSULE | Refills: 0 | Status: SHIPPED | OUTPATIENT
Start: 2025-02-14

## 2025-02-14 RX ORDER — LOSARTAN POTASSIUM 100 MG/1
100 TABLET ORAL DAILY
Qty: 15 TABLET | Refills: 0 | Status: SHIPPED | OUTPATIENT
Start: 2025-02-14 | End: 2025-03-01

## 2025-03-01 DIAGNOSIS — K21.9 GASTROESOPHAGEAL REFLUX DISEASE WITHOUT ESOPHAGITIS: ICD-10-CM

## 2025-03-01 RX ORDER — FAMOTIDINE 40 MG/1
40 TABLET, FILM COATED ORAL DAILY
Qty: 30 TABLET | Refills: 0 | Status: SHIPPED | OUTPATIENT
Start: 2025-03-01 | End: 2025-03-31

## 2025-03-06 DIAGNOSIS — I15.9 SECONDARY HYPERTENSION: ICD-10-CM

## 2025-03-06 RX ORDER — SPIRONOLACTONE 25 MG/1
50 TABLET ORAL DAILY
Qty: 92 TABLET | Refills: 0 | Status: SHIPPED | OUTPATIENT
Start: 2025-03-06 | End: 2025-04-21

## 2025-03-06 NOTE — TELEPHONE ENCOUNTER
Requested Prescriptions     Pending Prescriptions Disp Refills    spironolactone (ALDACTONE) 25 MG tablet 92 tablet 0     Sig: Take 2 tablets by mouth daily        Last Visit 9/18/25  Last Refill       Called and made an appointment with the patient, she is waiting for her daughter to return from [place] and she will bring her

## 2025-03-10 DIAGNOSIS — I15.9 SECONDARY HYPERTENSION: ICD-10-CM

## 2025-03-10 RX ORDER — AMLODIPINE BESYLATE 10 MG/1
10 TABLET ORAL DAILY
Qty: 30 TABLET | Refills: 0 | Status: SHIPPED | OUTPATIENT
Start: 2025-03-10

## 2025-03-11 DIAGNOSIS — I10 PRIMARY HYPERTENSION: ICD-10-CM

## 2025-03-11 DIAGNOSIS — F51.5 NIGHTMARES: ICD-10-CM

## 2025-03-11 RX ORDER — PRAZOSIN HYDROCHLORIDE 1 MG/1
1 CAPSULE ORAL 2 TIMES DAILY
Qty: 82 CAPSULE | Refills: 0 | Status: SHIPPED | OUTPATIENT
Start: 2025-03-11 | End: 2025-04-21 | Stop reason: DRUGHIGH

## 2025-03-11 RX ORDER — LOSARTAN POTASSIUM 100 MG/1
100 TABLET ORAL DAILY
Qty: 41 TABLET | Refills: 0 | Status: SHIPPED | OUTPATIENT
Start: 2025-03-11 | End: 2025-04-21 | Stop reason: SDUPTHER

## 2025-03-30 DIAGNOSIS — K21.9 GASTROESOPHAGEAL REFLUX DISEASE WITHOUT ESOPHAGITIS: ICD-10-CM

## 2025-03-30 RX ORDER — FAMOTIDINE 40 MG/1
40 TABLET, FILM COATED ORAL DAILY
Qty: 30 TABLET | Refills: 0 | Status: SHIPPED | OUTPATIENT
Start: 2025-03-30 | End: 2025-04-29

## 2025-03-31 DIAGNOSIS — J30.9 ALLERGIC RHINITIS, UNSPECIFIED SEASONALITY, UNSPECIFIED TRIGGER: ICD-10-CM

## 2025-03-31 RX ORDER — LEVOCETIRIZINE DIHYDROCHLORIDE 5 MG/1
5 TABLET, FILM COATED ORAL DAILY
Qty: 30 TABLET | Refills: 0 | Status: SHIPPED | OUTPATIENT
Start: 2025-03-31

## 2025-04-07 DIAGNOSIS — I15.9 SECONDARY HYPERTENSION: ICD-10-CM

## 2025-04-07 RX ORDER — AMLODIPINE BESYLATE 10 MG/1
10 TABLET ORAL DAILY
Qty: 30 TABLET | Refills: 0 | Status: SHIPPED | OUTPATIENT
Start: 2025-04-07

## 2025-04-17 ENCOUNTER — TELEPHONE (OUTPATIENT)
Dept: PRIMARY CARE CLINIC | Facility: CLINIC | Age: 85
End: 2025-04-17

## 2025-04-17 SDOH — HEALTH STABILITY: PHYSICAL HEALTH: ON AVERAGE, HOW MANY DAYS PER WEEK DO YOU ENGAGE IN MODERATE TO STRENUOUS EXERCISE (LIKE A BRISK WALK)?: 3 DAYS

## 2025-04-17 SDOH — HEALTH STABILITY: PHYSICAL HEALTH: ON AVERAGE, HOW MANY MINUTES DO YOU ENGAGE IN EXERCISE AT THIS LEVEL?: 10 MIN

## 2025-04-17 ASSESSMENT — PATIENT HEALTH QUESTIONNAIRE - PHQ9
SUM OF ALL RESPONSES TO PHQ QUESTIONS 1-9: 0
1. LITTLE INTEREST OR PLEASURE IN DOING THINGS: NOT AT ALL
2. FEELING DOWN, DEPRESSED OR HOPELESS: NOT AT ALL

## 2025-04-17 ASSESSMENT — LIFESTYLE VARIABLES
HOW MANY STANDARD DRINKS CONTAINING ALCOHOL DO YOU HAVE ON A TYPICAL DAY: PATIENT DOES NOT DRINK
HOW OFTEN DO YOU HAVE SIX OR MORE DRINKS ON ONE OCCASION: 1
HOW MANY STANDARD DRINKS CONTAINING ALCOHOL DO YOU HAVE ON A TYPICAL DAY: 0
HOW OFTEN DO YOU HAVE A DRINK CONTAINING ALCOHOL: NEVER
HOW OFTEN DO YOU HAVE A DRINK CONTAINING ALCOHOL: 1

## 2025-04-18 DIAGNOSIS — F51.5 NIGHTMARES: ICD-10-CM

## 2025-04-18 DIAGNOSIS — I10 PRIMARY HYPERTENSION: ICD-10-CM

## 2025-04-18 RX ORDER — LOSARTAN POTASSIUM 100 MG/1
100 TABLET ORAL DAILY
Qty: 41 TABLET | Refills: 0 | OUTPATIENT
Start: 2025-04-18 | End: 2025-05-29

## 2025-04-18 RX ORDER — PRAZOSIN HYDROCHLORIDE 1 MG/1
1 CAPSULE ORAL 2 TIMES DAILY
Qty: 82 CAPSULE | Refills: 0 | OUTPATIENT
Start: 2025-04-18

## 2025-04-21 ENCOUNTER — OFFICE VISIT (OUTPATIENT)
Dept: PRIMARY CARE CLINIC | Facility: CLINIC | Age: 85
End: 2025-04-21
Payer: MEDICARE

## 2025-04-21 VITALS
OXYGEN SATURATION: 98 % | DIASTOLIC BLOOD PRESSURE: 80 MMHG | SYSTOLIC BLOOD PRESSURE: 148 MMHG | HEART RATE: 86 BPM | WEIGHT: 210 LBS | TEMPERATURE: 97.6 F | HEIGHT: 70 IN | BODY MASS INDEX: 30.06 KG/M2 | RESPIRATION RATE: 20 BRPM

## 2025-04-21 DIAGNOSIS — R29.6 MULTIPLE FALLS: ICD-10-CM

## 2025-04-21 DIAGNOSIS — Z96.653 STATUS POST BILATERAL KNEE REPLACEMENTS: ICD-10-CM

## 2025-04-21 DIAGNOSIS — I10 PRIMARY HYPERTENSION: ICD-10-CM

## 2025-04-21 DIAGNOSIS — J30.89 SEASONAL ALLERGIC RHINITIS DUE TO OTHER ALLERGIC TRIGGER: ICD-10-CM

## 2025-04-21 DIAGNOSIS — R26.89 BALANCE PROBLEM: ICD-10-CM

## 2025-04-21 DIAGNOSIS — K21.9 GASTROESOPHAGEAL REFLUX DISEASE WITHOUT ESOPHAGITIS: ICD-10-CM

## 2025-04-21 DIAGNOSIS — I15.9 SECONDARY HYPERTENSION: ICD-10-CM

## 2025-04-21 DIAGNOSIS — Z00.00 MEDICARE ANNUAL WELLNESS VISIT, SUBSEQUENT: Primary | ICD-10-CM

## 2025-04-21 DIAGNOSIS — M17.0 PRIMARY OSTEOARTHRITIS OF BOTH KNEES: ICD-10-CM

## 2025-04-21 DIAGNOSIS — F51.5 NIGHTMARE DISORDER: ICD-10-CM

## 2025-04-21 PROCEDURE — 1160F RVW MEDS BY RX/DR IN RCRD: CPT | Performed by: INTERNAL MEDICINE

## 2025-04-21 PROCEDURE — 1090F PRES/ABSN URINE INCON ASSESS: CPT | Performed by: INTERNAL MEDICINE

## 2025-04-21 PROCEDURE — 1125F AMNT PAIN NOTED PAIN PRSNT: CPT | Performed by: INTERNAL MEDICINE

## 2025-04-21 PROCEDURE — 1159F MED LIST DOCD IN RCRD: CPT | Performed by: INTERNAL MEDICINE

## 2025-04-21 PROCEDURE — 1123F ACP DISCUSS/DSCN MKR DOCD: CPT | Performed by: INTERNAL MEDICINE

## 2025-04-21 PROCEDURE — 1036F TOBACCO NON-USER: CPT | Performed by: INTERNAL MEDICINE

## 2025-04-21 PROCEDURE — 99214 OFFICE O/P EST MOD 30 MIN: CPT | Performed by: INTERNAL MEDICINE

## 2025-04-21 PROCEDURE — G8417 CALC BMI ABV UP PARAM F/U: HCPCS | Performed by: INTERNAL MEDICINE

## 2025-04-21 PROCEDURE — G0439 PPPS, SUBSEQ VISIT: HCPCS | Performed by: INTERNAL MEDICINE

## 2025-04-21 PROCEDURE — G8427 DOCREV CUR MEDS BY ELIG CLIN: HCPCS | Performed by: INTERNAL MEDICINE

## 2025-04-21 RX ORDER — LOSARTAN POTASSIUM 100 MG/1
100 TABLET ORAL DAILY
Qty: 90 TABLET | Refills: 0 | Status: SHIPPED | OUTPATIENT
Start: 2025-04-21 | End: 2025-07-20

## 2025-04-21 RX ORDER — FAMOTIDINE 40 MG/1
40 TABLET, FILM COATED ORAL DAILY
Qty: 90 TABLET | Refills: 0 | Status: SHIPPED | OUTPATIENT
Start: 2025-04-21 | End: 2025-07-20

## 2025-04-21 RX ORDER — AMLODIPINE BESYLATE 10 MG/1
10 TABLET ORAL DAILY
Qty: 90 TABLET | Refills: 0 | Status: SHIPPED | OUTPATIENT
Start: 2025-04-21

## 2025-04-21 RX ORDER — SPIRONOLACTONE 25 MG/1
50 TABLET ORAL DAILY
Qty: 180 TABLET | Refills: 0 | Status: SHIPPED | OUTPATIENT
Start: 2025-04-21 | End: 2025-04-23

## 2025-04-21 RX ORDER — PRAZOSIN HYDROCHLORIDE 2 MG/1
2 CAPSULE ORAL NIGHTLY
Qty: 30 CAPSULE | Refills: 3 | Status: SHIPPED | OUTPATIENT
Start: 2025-04-21

## 2025-04-21 SDOH — ECONOMIC STABILITY: FOOD INSECURITY: WITHIN THE PAST 12 MONTHS, YOU WORRIED THAT YOUR FOOD WOULD RUN OUT BEFORE YOU GOT MONEY TO BUY MORE.: NEVER TRUE

## 2025-04-21 SDOH — ECONOMIC STABILITY: FOOD INSECURITY: WITHIN THE PAST 12 MONTHS, THE FOOD YOU BOUGHT JUST DIDN'T LAST AND YOU DIDN'T HAVE MONEY TO GET MORE.: NEVER TRUE

## 2025-04-21 ASSESSMENT — ENCOUNTER SYMPTOMS
SORE THROAT: 0
ABDOMINAL PAIN: 0
DIARRHEA: 0
EYE DISCHARGE: 0
SHORTNESS OF BREATH: 0
CHEST TIGHTNESS: 0
COUGH: 0
COLOR CHANGE: 0
BACK PAIN: 0
RHINORRHEA: 0
CONSTIPATION: 0

## 2025-04-21 NOTE — PROGRESS NOTES
\"Have you been to the ER, urgent care clinic since your last visit?  Hospitalized since your last visit?\"    NO      “Have you seen or consulted any other health care providers outside our system since your last visit?”    NO      Chief Complaint   Patient presents with    Medicare AWV    Referral - General     Ortho       Pt is ok with scribe.

## 2025-04-21 NOTE — PROGRESS NOTES
Medicare Annual Wellness Visit    Edmond Kiran is here for Medicare AWV and Referral - General (Ortho)    Assessment & Plan   Medicare annual wellness visit, subsequent  .Age appropriate Health screening and immunization discussed with patient.       Subjective       Patient's complete Health Risk Assessment and screening values have been reviewed and are found in Flowsheets. The following problems were reviewed today and where indicated follow up appointments were made and/or referrals ordered.    Positive Risk Factor Screenings with Interventions:    Fall Risk:  Do you feel unsteady or are you worried about falling? : (!) (Proxy-Rptd) yes  2 or more falls in past year?: (Proxy-Rptd) no  Fall with injury in past year?: (Proxy-Rptd) no     Interventions:    Reviewed medications, home hazards, visual acuity, and co-morbidities that can increase risk for falls  Referral: Physical Therapy (PT)            General HRA Questions:  Select all that apply: (!) (Proxy-Rptd) New or Increased Pain    Interventions - Pain:  Patient comments: takes extra strength Tylenol         Abnormal BMI (obese):  Body mass index is 30.13 kg/m². (!) Abnormal    Interventions:  low carbohydrate diet, cut down on sweets and soda recommended         Dentist Screen:  Have you seen the dentist within the past year?: (!) (Proxy-Rptd) No    Intervention:  Patient comments: has dentures so doesn`t see dentist     Hearing Screen:  Do you or your family notice any trouble with your hearing that hasn't been managed with hearing aids?: (!) (Proxy-Rptd) Yes    Interventions:  Referred to Audiology    Vision Screen:  Do you have difficulty driving, watching TV, or doing any of your daily activities because of your eyesight?: (!) (Proxy-Rptd) Yes  Have you had an eye exam within the past year?: (Proxy-Rptd) Appointment is scheduled    Interventions:   Patient comments: has an appointment on Thursday      ADL's:   Patient reports needing help with:  Select

## 2025-04-21 NOTE — PATIENT INSTRUCTIONS
your work and home life. It can make you feel lonely or depressed. You may feel that you have lost your independence. But hearing aids and other devices can help you hear better and feel connected to others.  Follow-up care is a key part of your treatment and safety. Be sure to make and go to all appointments, and call your doctor if you are having problems. It's also a good idea to know your test results and keep a list of the medicines you take.  How can you care for yourself at home?  Avoid loud noises whenever possible. This helps keep your hearing from getting worse.  Always wear hearing protection around loud noises.  Wear a hearing aid as directed.  A professional can help you pick a hearing aid that will work best for you.  You can also get hearing aids over the counter for mild to moderate hearing loss.  Have hearing tests as your doctor suggests. They can show whether your hearing has changed. Your hearing aid may need to be adjusted.  Use other devices as needed. These may include:  Telephone amplifiers and hearing aids that can connect to a television, stereo, radio, or microphone.  Devices that use lights or vibrations. These alert you to the doorbell, a ringing telephone, or a baby monitor.  Television closed-captioning. This shows the words at the bottom of the screen. Most new TVs can do this.  TTY (text telephone). This lets you type messages back and forth on the telephone instead of talking or listening. These devices are also called TDD. When messages are typed on the keyboard, they are sent over the phone line to a receiving TTY. The message is shown on a monitor.  Use text messaging, social media, and email if it is hard for you to communicate by telephone.  Try to learn a listening technique called speechreading. It is not lipreading. You pay attention to people's gestures, expressions, posture, and tone of voice. These clues can help you understand what a person is saying. Face the person you

## 2025-04-21 NOTE — TELEPHONE ENCOUNTER
Patients daughter is at the pharmacy and says her mothers medicine is not there.  I asked what needed to be filled from today's appointment but she did not know the names of her medicines.  I know one of them is losartan but not any of the others.

## 2025-04-21 NOTE — PROGRESS NOTES
Edmond Kiran (:  1940) is a 85 y.o. female, Established patient, here for evaluation of the following chief complaint(s):  Medicare AWV and Referral - General (Ortho)        ASSESSMENT/PLAN:  1. Primary hypertension  -     prazosin (MINIPRESS) 2 MG capsule; Take 1 capsule by mouth nightly, Disp-30 capsule, R-3Normal sent to pharmacy.  -     CBC; Future  -     Comprehensive Metabolic Panel; Future  I increased prazosin from 1 mg  to 2 mg to continue taking daily.  I ordered a CBC and a CMP.  I advise that she purchase a new BP monitor to check her readings at home. I instructed her to check her BP 3-4 times per week at different times of the day and to send me the results through [employer].   2. Balance problem  -     External Referral To Home Health  I referred her to home health for balance and mobility training.   Provided a referral for [facility] to acquire a new walker.   3. Multiple falls  -     External Referral To Home Health  I referred her to home health for balance and mobility training.   4. Primary osteoarthritis of both knees  -     [facility] - Willis Lerner MD, Orthopedic Surgery (knee), [place]  I referred her to orthopedics to analyze knee replacements and determine next steps.   5. Status post bilateral knee replacements  -     [facility] Willis Sen MD, Orthopedic Surgery (knee), [place]  I referred her to orthopedics to analyze knee replacements and determine next steps.   6. Nightmare disorder  -     prazosin (MINIPRESS) 2 MG capsule; Take 1 capsule by mouth nightly, Disp-30 capsule, R-3Normal sent to pharmacy.  I increased prazosin from 1 mg  to 2 mg to continue taking daily.           Subjective   SUBJECTIVE/OBJECTIVE:  HPI    Patient presents today for a follow-up on chronic conditions and a MWV. She is fasting for cholesterol labs. She is accompanied by her daughter who provides supplemental information.    BP is high today at 169/81. 148/80 on manual repeat in left upper arm while

## 2025-04-22 LAB
ALBUMIN SERPL-MCNC: 4.4 G/DL (ref 3.5–5)
ALBUMIN/GLOB SERPL: 1.4 (ref 1.1–2.2)
ALP SERPL-CCNC: 90 U/L (ref 45–117)
ALT SERPL-CCNC: 20 U/L (ref 12–78)
ANION GAP SERPL CALC-SCNC: 6 MMOL/L (ref 2–12)
AST SERPL-CCNC: 22 U/L (ref 15–37)
BILIRUB SERPL-MCNC: 1 MG/DL (ref 0.2–1)
BUN SERPL-MCNC: 14 MG/DL (ref 6–20)
BUN/CREAT SERPL: 13 (ref 12–20)
CALCIUM SERPL-MCNC: 10.2 MG/DL (ref 8.5–10.1)
CHLORIDE SERPL-SCNC: 103 MMOL/L (ref 97–108)
CO2 SERPL-SCNC: 31 MMOL/L (ref 21–32)
CREAT SERPL-MCNC: 1.04 MG/DL (ref 0.55–1.02)
ERYTHROCYTE [DISTWIDTH] IN BLOOD BY AUTOMATED COUNT: 12 % (ref 11.5–14.5)
GLOBULIN SER CALC-MCNC: 3.2 G/DL (ref 2–4)
GLUCOSE SERPL-MCNC: 93 MG/DL (ref 65–100)
HCT VFR BLD AUTO: 41.8 % (ref 35–47)
HGB BLD-MCNC: 13.2 G/DL (ref 11.5–16)
MCH RBC QN AUTO: 30.9 PG (ref 26–34)
MCHC RBC AUTO-ENTMCNC: 31.6 G/DL (ref 30–36.5)
MCV RBC AUTO: 97.9 FL (ref 80–99)
NRBC # BLD: 0 K/UL (ref 0–0.01)
NRBC BLD-RTO: 0 PER 100 WBC
PLATELET # BLD AUTO: 200 K/UL (ref 150–400)
PMV BLD AUTO: 11 FL (ref 8.9–12.9)
POTASSIUM SERPL-SCNC: 4.1 MMOL/L (ref 3.5–5.1)
PROT SERPL-MCNC: 7.6 G/DL (ref 6.4–8.2)
RBC # BLD AUTO: 4.27 M/UL (ref 3.8–5.2)
SODIUM SERPL-SCNC: 140 MMOL/L (ref 136–145)
WBC # BLD AUTO: 5.4 K/UL (ref 3.6–11)

## 2025-04-22 RX ORDER — FLUTICASONE PROPIONATE 50 MCG
SPRAY, SUSPENSION (ML) NASAL
Qty: 16 ML | Refills: 0 | Status: SHIPPED | OUTPATIENT
Start: 2025-04-22

## 2025-04-23 DIAGNOSIS — I15.9 SECONDARY HYPERTENSION: ICD-10-CM

## 2025-04-23 RX ORDER — SPIRONOLACTONE 25 MG/1
50 TABLET ORAL DAILY
Qty: 180 TABLET | Refills: 0 | Status: SHIPPED | OUTPATIENT
Start: 2025-04-23 | End: 2025-07-22

## 2025-04-24 ENCOUNTER — RESULTS FOLLOW-UP (OUTPATIENT)
Dept: PRIMARY CARE CLINIC | Facility: CLINIC | Age: 85
End: 2025-04-24

## 2025-04-28 DIAGNOSIS — K21.9 GASTROESOPHAGEAL REFLUX DISEASE WITHOUT ESOPHAGITIS: ICD-10-CM

## 2025-04-28 RX ORDER — FAMOTIDINE 40 MG/1
40 TABLET, FILM COATED ORAL DAILY
Qty: 30 TABLET | OUTPATIENT
Start: 2025-04-28 | End: 2025-07-27

## 2025-05-02 DIAGNOSIS — J30.9 ALLERGIC RHINITIS, UNSPECIFIED SEASONALITY, UNSPECIFIED TRIGGER: ICD-10-CM

## 2025-05-03 RX ORDER — LEVOCETIRIZINE DIHYDROCHLORIDE 5 MG/1
5 TABLET, FILM COATED ORAL DAILY
Qty: 90 TABLET | Refills: 0 | Status: SHIPPED | OUTPATIENT
Start: 2025-05-03

## 2025-05-08 ENCOUNTER — TELEPHONE (OUTPATIENT)
Dept: PRIMARY CARE CLINIC | Facility: CLINIC | Age: 85
End: 2025-05-08

## 2025-05-08 NOTE — TELEPHONE ENCOUNTER
Patient calling wanting to know if it was ok for her to take the prazosin in the evening because she is very drowsy with the medication since the increase.

## 2025-05-09 DIAGNOSIS — I15.9 SECONDARY HYPERTENSION: ICD-10-CM

## 2025-05-09 RX ORDER — AMLODIPINE BESYLATE 10 MG/1
10 TABLET ORAL DAILY
Qty: 30 TABLET | Refills: 0 | OUTPATIENT
Start: 2025-05-09

## 2025-05-09 NOTE — TELEPHONE ENCOUNTER
Called the patient, she was wanting to know if she could take the Prazosin at night time. Told her that yes, she should be taking this at night time- this is to help with nightmare. She said okay great, thank you

## 2025-05-29 ENCOUNTER — HOSPITAL ENCOUNTER (EMERGENCY)
Facility: HOSPITAL | Age: 85
Discharge: HOME | End: 2025-05-29
Attending: EMERGENCY MEDICINE
Payer: MEDICARE

## 2025-05-29 ENCOUNTER — TELEPHONE (OUTPATIENT)
Dept: PRIMARY CARE CLINIC | Facility: CLINIC | Age: 85
End: 2025-05-29

## 2025-05-29 VITALS
SYSTOLIC BLOOD PRESSURE: 178 MMHG | OXYGEN SATURATION: 96 % | TEMPERATURE: 98 F | HEART RATE: 77 BPM | DIASTOLIC BLOOD PRESSURE: 90 MMHG | HEIGHT: 70 IN | WEIGHT: 210.1 LBS | RESPIRATION RATE: 23 BRPM | BODY MASS INDEX: 30.08 KG/M2

## 2025-05-29 DIAGNOSIS — I10 ASYMPTOMATIC HYPERTENSION: Primary | ICD-10-CM

## 2025-05-29 LAB
ALBUMIN SERPL-MCNC: 4 G/DL (ref 3.5–5)
ALBUMIN/GLOB SERPL: 1.1 (ref 1.1–2.2)
ALP SERPL-CCNC: 92 U/L (ref 45–117)
ALT SERPL-CCNC: 23 U/L (ref 12–78)
ANION GAP SERPL CALC-SCNC: 2 MMOL/L (ref 2–12)
APPEARANCE UR: CLEAR
AST SERPL-CCNC: 22 U/L (ref 15–37)
BACTERIA URNS QL MICRO: NEGATIVE /HPF
BASOPHILS # BLD: 0.02 K/UL (ref 0–0.1)
BASOPHILS NFR BLD: 0.4 % (ref 0–1)
BILIRUB SERPL-MCNC: 0.5 MG/DL (ref 0.2–1)
BILIRUB UR QL: NEGATIVE
BUN SERPL-MCNC: 14 MG/DL (ref 6–20)
BUN/CREAT SERPL: 13 (ref 12–20)
CALCIUM SERPL-MCNC: 9.7 MG/DL (ref 8.5–10.1)
CHLORIDE SERPL-SCNC: 103 MMOL/L (ref 97–108)
CO2 SERPL-SCNC: 32 MMOL/L (ref 21–32)
COLOR UR: ABNORMAL
COMMENT:: NORMAL
CREAT SERPL-MCNC: 1.08 MG/DL (ref 0.55–1.02)
DIFFERENTIAL METHOD BLD: ABNORMAL
EOSINOPHIL # BLD: 0.06 K/UL (ref 0–0.4)
EOSINOPHIL NFR BLD: 1.2 % (ref 0–7)
EPITH CASTS URNS QL MICRO: ABNORMAL /LPF
ERYTHROCYTE [DISTWIDTH] IN BLOOD BY AUTOMATED COUNT: 11.4 % (ref 11.5–14.5)
GLOBULIN SER CALC-MCNC: 3.8 G/DL (ref 2–4)
GLUCOSE SERPL-MCNC: 94 MG/DL (ref 65–100)
GLUCOSE UR STRIP.AUTO-MCNC: NEGATIVE MG/DL
HCT VFR BLD AUTO: 41.6 % (ref 35–47)
HGB BLD-MCNC: 13.5 G/DL (ref 11.5–16)
HGB UR QL STRIP: ABNORMAL
HYALINE CASTS URNS QL MICRO: ABNORMAL /LPF (ref 0–5)
IMM GRANULOCYTES # BLD AUTO: 0.02 K/UL (ref 0–0.04)
IMM GRANULOCYTES NFR BLD AUTO: 0.4 % (ref 0–0.5)
KETONES UR QL STRIP.AUTO: NEGATIVE MG/DL
LEUKOCYTE ESTERASE UR QL STRIP.AUTO: ABNORMAL
LYMPHOCYTES # BLD: 2.09 K/UL (ref 0.8–3.5)
LYMPHOCYTES NFR BLD: 41.5 % (ref 12–49)
MCH RBC QN AUTO: 31.1 PG (ref 26–34)
MCHC RBC AUTO-ENTMCNC: 32.5 G/DL (ref 30–36.5)
MCV RBC AUTO: 95.9 FL (ref 80–99)
MONOCYTES # BLD: 0.35 K/UL (ref 0–1)
MONOCYTES NFR BLD: 6.9 % (ref 5–13)
NEUTS SEG # BLD: 2.5 K/UL (ref 1.8–8)
NEUTS SEG NFR BLD: 49.6 % (ref 32–75)
NITRITE UR QL STRIP.AUTO: NEGATIVE
NRBC # BLD: 0 K/UL (ref 0–0.01)
NRBC BLD-RTO: 0 PER 100 WBC
PH UR STRIP: 6 (ref 5–8)
PLATELET # BLD AUTO: 222 K/UL (ref 150–400)
PMV BLD AUTO: 10.3 FL (ref 8.9–12.9)
POTASSIUM SERPL-SCNC: 3.9 MMOL/L (ref 3.5–5.1)
PROT SERPL-MCNC: 7.8 G/DL (ref 6.4–8.2)
PROT UR STRIP-MCNC: NEGATIVE MG/DL
RBC # BLD AUTO: 4.34 M/UL (ref 3.8–5.2)
RBC #/AREA URNS HPF: ABNORMAL /HPF (ref 0–5)
SODIUM SERPL-SCNC: 137 MMOL/L (ref 136–145)
SP GR UR REFRACTOMETRY: 1.01 (ref 1–1.03)
SPECIMEN HOLD: NORMAL
SPECIMEN HOLD: NORMAL
TROPONIN I SERPL HS-MCNC: <4 NG/L (ref 0–51)
UROBILINOGEN UR QL STRIP.AUTO: 0.2 EU/DL (ref 0.2–1)
WBC # BLD AUTO: 5 K/UL (ref 3.6–11)
WBC URNS QL MICRO: ABNORMAL /HPF (ref 0–4)

## 2025-05-29 PROCEDURE — 84484 ASSAY OF TROPONIN QUANT: CPT

## 2025-05-29 PROCEDURE — 99284 EMERGENCY DEPT VISIT MOD MDM: CPT

## 2025-05-29 PROCEDURE — 93005 ELECTROCARDIOGRAM TRACING: CPT | Performed by: NURSE PRACTITIONER

## 2025-05-29 PROCEDURE — 81001 URINALYSIS AUTO W/SCOPE: CPT

## 2025-05-29 PROCEDURE — 85025 COMPLETE CBC W/AUTO DIFF WBC: CPT

## 2025-05-29 PROCEDURE — 80053 COMPREHEN METABOLIC PANEL: CPT

## 2025-05-29 ASSESSMENT — ENCOUNTER SYMPTOMS
BACK PAIN: 0
CONSTIPATION: 0
NAUSEA: 0
ABDOMINAL PAIN: 0
DIARRHEA: 0
SHORTNESS OF BREATH: 0
VOMITING: 0
COLOR CHANGE: 0

## 2025-05-29 ASSESSMENT — PAIN - FUNCTIONAL ASSESSMENT: PAIN_FUNCTIONAL_ASSESSMENT: NONE - DENIES PAIN

## 2025-05-29 NOTE — ED TRIAGE NOTES
TRIAGE NOTE:  Patient arrives from physical therapy with blood pressures that are trending upward.      Recordings today noted around 180/90.

## 2025-05-29 NOTE — ED TRIAGE NOTES
4:14 PM  I have evaluated the patient as the Provider in Rapid Medical Evaluation (RME). I have reviewed her vital signs and the triage nurse assessment. I have talked with the patient and any available family and advised that I am the provider in triage and have ordered the appropriate study to initiate their work up based on the clinical presentation during my assessment. I have advised that the patient will be accommodated in the Main ED as soon as possible. I have also requested to contact the triage nurse or myself immediately if the patient experiences any changes in their condition during this brief waiting period.    Patient with Hx: HTN; states she goes to physical therapy s/p bilateral TKR.   Takes Prazosin 2 mg, Amlodipine 10 mg, Losartan 100 mg. Recent increase from  to 2 mg Prazosin.   No chest pain, no shortness of breath, no dizziness. Positive fatigue.  Vane Celeste, JOSE - NP

## 2025-05-29 NOTE — TELEPHONE ENCOUNTER
Sabiha Puente with [facility] OT called to let Dr Fox know that the patient had very high BP and she encouraged her to go to the ER.  Her BP readings were:  175/80 and 180/89.  Please reach out to Sabiha at Ph# [phone]

## 2025-05-29 NOTE — ED PROVIDER NOTES
[facility] EMERGENCY DEPARTMENT  EMERGENCY DEPARTMENT ENCOUNTER      Pt Name: Edmond Kiran  MRN: [id number]  Birthdate 1940  Date of evaluation: 5/29/2025  Provider: Garrison Wayne MD    CHIEF COMPLAINT       Chief Complaint   Patient presents with    Hypertension         HISTORY OF PRESENT ILLNESS   (Location/Symptom, Timing/Onset, Context/Setting, Quality, Duration, Modifying Factors, Severity)  Note limiting factors.   Edmond Kiran is a 85 y.o. female who presents to the emergency department      The history is provided by the patient. No  was used.   Hypertension  Severity:  Moderate  Onset quality:  Gradual  Timing:  Constant  Progression:  Unchanged  Chronicity:  Recurrent  Context: medication change    Ineffective treatments:  None tried  Associated symptoms: no abdominal pain, no chest pain, no confusion, no dizziness, no epistaxis, no fever, no headaches, no hematuria, no nausea, no neck pain, no palpitations, no shortness of breath, not vomiting and no weakness        Nursing Notes were reviewed.    REVIEW OF SYSTEMS    (2-9 systems for level 4, 10 or more for level 5)     Review of Systems   Constitutional:  Negative for activity change, chills and fever.   HENT:  Negative for nosebleeds.    Eyes:  Negative for visual disturbance.   Respiratory:  Negative for shortness of breath.    Cardiovascular:  Negative for chest pain and palpitations.   Gastrointestinal:  Negative for abdominal pain, constipation, diarrhea, nausea and vomiting.   Genitourinary:  Negative for difficulty urinating, dysuria, hematuria and urgency.   Musculoskeletal:  Negative for back pain, neck pain and neck stiffness.   Skin:  Negative for color change.   Allergic/Immunologic: Negative for immunocompromised state.   Neurological:  Negative for dizziness, seizures, syncope, weakness, light-headedness, numbness and headaches.   Psychiatric/Behavioral:  Negative for behavioral problems, confusion,  acute distress.     Appearance: Normal appearance. She is not ill-appearing, toxic-appearing or diaphoretic.   HENT:      Head: Normocephalic and atraumatic.      Nose: Nose normal.      Mouth/Throat:      Mouth: Mucous membranes are moist.   Eyes:      Extraocular Movements: Extraocular movements intact.      Conjunctiva/sclera: Conjunctivae normal.      Pupils: Pupils are equal, round, and reactive to light.   Cardiovascular:      Comments: Warm and well perfused  Pulmonary:      Effort: Pulmonary effort is normal.   Musculoskeletal:         General: Normal range of motion.      Cervical back: Normal range of motion.   Skin:     General: Skin is warm and dry.   Neurological:      General: No focal deficit present.      Mental Status: She is alert and oriented to person, place, and time.   Psychiatric:         Mood and Affect: Mood normal.         Behavior: Behavior normal.         Thought Content: Thought content normal.         Judgment: Judgment normal.         DIAGNOSTIC RESULTS     EKG: All EKG's are interpreted by the Emergency Department Physician who either signs or Co-signs this chart in the absence of a cardiologist.        RADIOLOGY:   Non-plain film images such as CT, Ultrasound and MRI are read by the radiologist. Plain radiographic images are visualized and preliminarily interpreted by the emergency physician with the below findings:        Interpretation per the Radiologist below, if available at the time of this note:    No orders to display         ED BEDSIDE ULTRASOUND:   Performed by ED Physician - none    LABS:  Labs Reviewed   CBC WITH AUTO DIFFERENTIAL - Abnormal; Notable for the following components:       Result Value    RDW 11.4 (*)     All other components within normal limits   COMPREHENSIVE METABOLIC PANEL - Abnormal; Notable for the following components:    Creatinine 1.08 (*)     Est, Glom Filt Rate 50 (*)     All other components within normal limits   URINALYSIS WITH MICROSCOPIC -

## 2025-05-30 LAB
EKG DIAGNOSIS: NORMAL
EKG Q-T INTERVAL: 366 MS
EKG QRS DURATION: 80 MS
EKG QTC CALCULATION (BAZETT): 403 MS
EKG R AXIS: 73 DEGREES
EKG T AXIS: -73 DEGREES
EKG VENTRICULAR RATE: 73 BPM

## 2025-05-30 PROCEDURE — 93010 ELECTROCARDIOGRAM REPORT: CPT | Performed by: SPECIALIST

## 2025-05-30 NOTE — TELEPHONE ENCOUNTER
Called and spoke to Sabiha and pts BP was high and pt did go to ER. She said she would check back in with pt.

## 2025-06-02 ENCOUNTER — OFFICE VISIT (OUTPATIENT)
Dept: PRIMARY CARE CLINIC | Facility: CLINIC | Age: 85
End: 2025-06-02
Payer: MEDICARE

## 2025-06-02 VITALS
RESPIRATION RATE: 16 BRPM | HEART RATE: 81 BPM | SYSTOLIC BLOOD PRESSURE: 148 MMHG | HEIGHT: 70 IN | BODY MASS INDEX: 29.66 KG/M2 | OXYGEN SATURATION: 98 % | WEIGHT: 207.2 LBS | TEMPERATURE: 97.1 F | DIASTOLIC BLOOD PRESSURE: 80 MMHG

## 2025-06-02 DIAGNOSIS — K59.03 DRUG-INDUCED CONSTIPATION: ICD-10-CM

## 2025-06-02 DIAGNOSIS — M17.0 PRIMARY OSTEOARTHRITIS OF BOTH KNEES: ICD-10-CM

## 2025-06-02 DIAGNOSIS — I10 UNCONTROLLED HYPERTENSION: Primary | ICD-10-CM

## 2025-06-02 DIAGNOSIS — Z91.89 HAS POORLY BALANCED DIET: ICD-10-CM

## 2025-06-02 DIAGNOSIS — R26.89 BALANCE PROBLEM: ICD-10-CM

## 2025-06-02 PROCEDURE — 1123F ACP DISCUSS/DSCN MKR DOCD: CPT | Performed by: INTERNAL MEDICINE

## 2025-06-02 PROCEDURE — 1159F MED LIST DOCD IN RCRD: CPT | Performed by: INTERNAL MEDICINE

## 2025-06-02 PROCEDURE — G8427 DOCREV CUR MEDS BY ELIG CLIN: HCPCS | Performed by: INTERNAL MEDICINE

## 2025-06-02 PROCEDURE — 1160F RVW MEDS BY RX/DR IN RCRD: CPT | Performed by: INTERNAL MEDICINE

## 2025-06-02 PROCEDURE — 1126F AMNT PAIN NOTED NONE PRSNT: CPT | Performed by: INTERNAL MEDICINE

## 2025-06-02 PROCEDURE — G8417 CALC BMI ABV UP PARAM F/U: HCPCS | Performed by: INTERNAL MEDICINE

## 2025-06-02 PROCEDURE — 1036F TOBACCO NON-USER: CPT | Performed by: INTERNAL MEDICINE

## 2025-06-02 PROCEDURE — 1090F PRES/ABSN URINE INCON ASSESS: CPT | Performed by: INTERNAL MEDICINE

## 2025-06-02 PROCEDURE — 99214 OFFICE O/P EST MOD 30 MIN: CPT | Performed by: INTERNAL MEDICINE

## 2025-06-02 RX ORDER — SENNA AND DOCUSATE SODIUM 50; 8.6 MG/1; MG/1
2 TABLET, FILM COATED ORAL DAILY
Qty: 60 TABLET | Refills: 0 | Status: SHIPPED | OUTPATIENT
Start: 2025-06-02

## 2025-06-02 ASSESSMENT — ENCOUNTER SYMPTOMS
SORE THROAT: 0
BACK PAIN: 0
RHINORRHEA: 0
EYE DISCHARGE: 0
ABDOMINAL PAIN: 0
COUGH: 0
COLOR CHANGE: 0
CONSTIPATION: 0
CHEST TIGHTNESS: 0
SHORTNESS OF BREATH: 0
DIARRHEA: 0

## 2025-06-02 NOTE — PROGRESS NOTES
Edmodn Kiran (:  1940) is a 85 y.o. female, Established patient, here for evaluation of the following chief complaint(s):  Discuss Medications and Blood Pressure Check    ASSESSMENT/PLAN:  1. Uncontrolled hypertension  Labs done in the ER , CBC, Troponin, CMP reviewed.   I informed the pt that she needs to more closely monitor her diet and  avoid processed foods and drinks that have a high amount of sodium. No changes to medications will be made at this time. Pt should continue to monitor her BP at home. I also guided the pt through her daily medications and provided her with a written list of her medications and dosages.   2. Balance problem  I recommend the pt continue home PT/OT.  3. Primary osteoarthritis of both knees  Pt's knee pain and mobility have improved. Continue PT/OT and using walker as needed.  4. Drug-induced constipation  -     sennosides-docusate sodium (SENOKOT-S) 8.6-50 MG tablet; Take 2 tablets by mouth daily, Disp-60 tablet, R-0Normal sent to pharmacy.  I prescribed Senokot-S 8.6-50 mg to start taking daily. She may take 2 tablets if needed. Potential side effects were discussed.  5. Has poorly balanced diet  There have been some  dietary compliance issues here. I have discussed with her the great importance of monitoring her diet and sodium intake to avoid further hypertensive crises.           Subjective   SUBJECTIVE/OBJECTIVE:  HPI    Patient presents today for discussion of medications and BP check. She is accompanied by her daughter who provides supplemental information.    BP is elevated in office today at 174/82. 148/80 on manual repeat in left upper arm while sitting down. She takes amlodipine 10 mg, losartan 100 mg, prazosin 2 mg, and spironolactone 50 mg daily. Pt visited the ER on 2025 complaining of HTN with BP recorded around 180/90. She was otherwise asymptomatic and in no distress. She is scheduled to see a cardiologist in the near future.     She denies headaches 
01/02/2017    Zoster Recombinant (Shingrix) 03/16/2016, 12/05/2016, 09/26/2020, 11/27/2020

## 2025-06-03 DIAGNOSIS — I1A.0 RESISTANT HYPERTENSION: Primary | ICD-10-CM

## 2025-06-04 ENCOUNTER — CARE COORDINATION (OUTPATIENT)
Facility: CLINIC | Age: 85
End: 2025-06-04

## 2025-06-04 NOTE — PROGRESS NOTES
Remote Patient Monitoring Treatment Plan    Received request from AC/Nivia Rollins RN   to order remote patient monitoring for in home monitoring of HTN; Condition managed by PCP.  and order completed.     Patient will be monitoring blood pressure   pulse ox .      Patient will engage in Remote Patient Monitoring each day to develop the skills necessary for self management.       RPM Care Team Responsibilities:   Alerts will be reviewed daily and addressed within 2-4 hours during operational hours (Monday -Friday 9 am-4 pm)  Alert response and intervention documented in patient medical record  Alert response escalated to PCP per protocol and documented in patient medical record  Patient monitored over approximately  days  Discharge from program based on self-management readiness    See care coordination encounters for additional details.

## 2025-06-04 NOTE — CARE COORDINATION
Kit Order   Remote Patient Kit Ordering Note      Date/Time:  6/4/2025 11:07 AM      [facility]/family today to notify of RPM kit order; patient/family was available; discussed the following topics below and all questions answered.    [x] [facility] confirmed [facility] shipping address  [x] Patient will receive package over the next 1-3 business days. Someone 21 years or older must be present to sign for UPS delivery.  [x] HRS will contact patient within 24 hours, an HRS  will call the patient directly: If the patient does not answer, HRS will follow up with the clinical team notifying them about the unsuccessful attempt to contact the patient. HRS will make three call attempts to the patient.Provide patient with [facility] Virtual install number is: [phone].  [x] The RPM Nurse will contact patient once equipment is active to welcome them to the program.                                                         [x] Hours of RPM monitoring - Monday-Friday 3318-1107; encourage patient to get vitals entered by Noon each day to have the alert addressed same day.  [x][facility] mailed RPM Patient flyer to patient.                      ACM made aware the RPM kit has been ordered.

## 2025-06-06 ENCOUNTER — CARE COORDINATION (OUTPATIENT)
Dept: CASE MANAGEMENT | Age: 85
End: 2025-06-06

## 2025-06-06 NOTE — CARE COORDINATION
Remote Patient Monitoring Welcome Note  Date/Time:  2025 2:13 PM  Patient Current Location: Home: [address] [place] [address], [phone]  [facility] [id number]  Verified patients name and  as identifiers.       Completed and confirmed the following: verified name  and metrics today    [x] Patient received all RPM equipment (tablet, scale, blood pressure device and cuff, and pulse oximeter)  Cuff Size: regular (9.05\"-15.74\")    Weight Scale:  regular (<330lbs)                    [x] Instructed patient keep box for use when returning equipment                                                          [x] Reviewed Patient Welcome Letter with patient    [x]  Reviewed Consent Form  Copy of consent form in chart.                 [x] Reviewed expectations for patient and care team  Monitoring hours M-F 9-4pm  It is important to take your vitals every day, even on the weekends,to keep your care team aware of how you are doing every day of the week.  Completing monitoring by 12pm on  so that alerts can be responded to in the same day  Patient weighs self at same time every day (or after urinating and waking up)  Take blood pressure 1-2 hrs after medications   RPM team may have different phone area code (including [place], [place], [place] or [place])                              [x] Instructed patient to keep scale on flat surface                                                         [x] Instructed patient to keep tablet plugged in at all times                         [x] Instructed how to contact IT support  ([phone])  [x] Provided Remote Patient Monitoring care  information     Emergency Contact Verified: Yumi Kiran  [phone]                   All questions answered at this time.

## 2025-06-13 ENCOUNTER — CARE COORDINATION (OUTPATIENT)
Dept: CASE MANAGEMENT | Age: 85
End: 2025-06-13

## 2025-06-13 VITALS — SYSTOLIC BLOOD PRESSURE: 151 MMHG | HEART RATE: 92 BPM | OXYGEN SATURATION: 96 % | DIASTOLIC BLOOD PRESSURE: 73 MMHG

## 2025-06-13 NOTE — CARE COORDINATION
Remote Patient Monitoring Note      Date/Time:  2025 2:29 PM  Patient Current Location: Home: [address] [place] [address], [phone]  [facility] [address]  LPN contacted patient by telephone regarding red alert and yellow alert received for blood pressure reading (179/67) and pulse ox reading (91%). Verified patients name and  as identifiers.  Background: enrolled in [facility] for HTN  Clinical Interventions: Reviewed and followed up on alerts and treatments-CALL to pt regarding RED and Yellow alerts. Pt had already rechecked metrics. New readings are green alert WNL   /73  PULSE OX 96%   Plan/Follow Up: Will continue to review, monitor and address alerts with follow up based on severity of symptoms and risk factors.

## 2025-07-02 ENCOUNTER — CARE COORDINATION (OUTPATIENT)
Dept: CARE COORDINATION | Age: 85
End: 2025-07-02

## 2025-07-02 NOTE — CARE COORDINATION
Remote Alert Monitoring Note      Date/Time:  2025 7:32 AM  Patient Current Location: Home: [address] [place] [address], [address]  [facility] [address]    LPN contacted patient by telephone regarding red alert received for blood pressure heart rate (44). Verified patients name and  as identifiers.    Rpm alert to be reviewed by the provider                        Background: High Blood-Pressure     Refer to 911 immediately if:  Patient unresponsive or unable to provide history  Change in cognition or sudden confusion  Patient unable to respond in complete sentences  Intense chest pain/tightness  Any concern for any clinical emergency  Red Alert: Provider response time of 1 hr required for any red alert requiring intervention  Yellow Alert: Provider response time of 3hr required for any escalated yellow alert        Heart Rate HR Triage  Are you having any Chest Pain? no   Are you having any Shortness of Breath? no   Are you having any dizziness? no   Are you feeling more fatigued or tired than normal? no   Are you having any other health concerns or issues? no     Have you taken your medications as instructed by your doctor today? Yes       Clinical Interventions: Reviewed and followed up on alerts and treatments-. Pt is asymptomatic and in no apparent distress, speaking in full sentences.  Patient states she is feeling good and was able to recheck BP for HR while on the call. Updated reading 81.    **For any new or worsening symptoms or you are concerned in anyway, please contact your Provider or report to the nearest Emergency Room.**    Plan/Follow Up: Will continue to review, monitor and address alerts with follow up based on severity of symptoms and risk factors.    Luisa Garza LPN  [facility]/ CTN/ Remote Patient monitoring  [phone]

## 2025-07-10 ENCOUNTER — CARE COORDINATION (OUTPATIENT)
Dept: CASE MANAGEMENT | Age: 85
End: 2025-07-10

## 2025-07-14 ENCOUNTER — CARE COORDINATION (OUTPATIENT)
Dept: CARE COORDINATION | Age: 85
End: 2025-07-14

## 2025-07-14 NOTE — CARE COORDINATION
Remote Alert Monitoring Note      Date/Time:  2025 7:46 AM  Patient Current Location: Home: [address] [place] [address], [phone]  [facility] [address]    LPN contacted patient by telephone regarding yellow alert received for blood pressure reading (176/86). Verified patients name and  as identifiers.    Rpm alert to be reviewed by the provider                        Background:  High Blood-Pressure     Refer to 911 immediately if:  Patient unresponsive or unable to provide history  Change in cognition or sudden confusion  Patient unable to respond in complete sentences  Intense chest pain/tightness  Any concern for any clinical emergency  Red Alert: Provider response time of 1 hr required for any red alert requiring intervention  Yellow Alert: Provider response time of 3hr required for any escalated yellow alert        Blood Pressure BP Triage  Are you having any Chest Pain? no   Are you having any Shortness of Breath? no   Do you have a headache or have any vision changes? no   Are you having any numbness or tingling? no   Are you having any other health concerns or issues? no  Patient/Caregiver educated on how to properly take a blood pressure. Patient/Caregiver verbalizes understanding.     Have you taken your medications as instructed by your doctor today? Yes       Clinical Interventions: Reviewed and followed up on alerts and treatments-. Pt is asymptomatic and in no apparent distress, speaking in full sentences.  Patient states she is feeling ok other than some allergies. She reports taking her medications around 6am.  She is agreeable to sit and relax for 15-20 minutes prior to rechecking BP. Will await update.       **For any new or worsening symptoms or you are concerned in anyway, please contact your Provider or report to the nearest Emergency Room.**    Plan/Follow Up: Will continue to review, monitor and address alerts with follow up based on severity of symptoms and risk factors.    Luisa Garza,

## 2025-07-15 DIAGNOSIS — I10 PRIMARY HYPERTENSION: ICD-10-CM

## 2025-07-15 RX ORDER — LOSARTAN POTASSIUM 100 MG/1
100 TABLET ORAL DAILY
Qty: 90 TABLET | Refills: 0 | Status: SHIPPED | OUTPATIENT
Start: 2025-07-15 | End: 2025-10-13

## 2025-07-18 DIAGNOSIS — I15.9 SECONDARY HYPERTENSION: ICD-10-CM

## 2025-07-18 DIAGNOSIS — K21.9 GASTROESOPHAGEAL REFLUX DISEASE WITHOUT ESOPHAGITIS: ICD-10-CM

## 2025-07-18 RX ORDER — SPIRONOLACTONE 25 MG/1
50 TABLET ORAL DAILY
Qty: 180 TABLET | Refills: 0 | Status: SHIPPED | OUTPATIENT
Start: 2025-07-18 | End: 2025-10-16

## 2025-07-18 RX ORDER — FAMOTIDINE 40 MG/1
40 TABLET, FILM COATED ORAL DAILY
Qty: 90 TABLET | Refills: 0 | Status: SHIPPED | OUTPATIENT
Start: 2025-07-18 | End: 2025-10-16

## 2025-07-21 DIAGNOSIS — K21.9 GASTROESOPHAGEAL REFLUX DISEASE WITHOUT ESOPHAGITIS: ICD-10-CM

## 2025-07-21 RX ORDER — FAMOTIDINE 40 MG/1
40 TABLET, FILM COATED ORAL DAILY
Qty: 90 TABLET | Refills: 0 | OUTPATIENT
Start: 2025-07-21 | End: 2025-10-19

## 2025-07-22 ENCOUNTER — CARE COORDINATION (OUTPATIENT)
Facility: CLINIC | Age: 85
End: 2025-07-22

## 2025-07-22 DIAGNOSIS — I1A.0 RESISTANT HYPERTENSION: Primary | ICD-10-CM

## 2025-07-22 NOTE — CARE COORDINATION
Kit Return Edmond Kiran  7/22/25    Care Coordination  placed call to Patient  to arrange RPM kit  through UPS.     CCSS spoke to Patient reviewed with Patient how to pack equipment in original packing. Patient aware UPS will  equipment in 2-4 days.   All questions and concerns answered.

## 2025-07-22 NOTE — PROGRESS NOTES
Remote Patient Order Discontinued    Received request from Nivia Casanova RN   to discontinue order for remote patient monitoring of HTN and order completed.

## 2025-07-28 ENCOUNTER — ANCILLARY PROCEDURE (OUTPATIENT)
Age: 85
End: 2025-07-28
Payer: MEDICARE

## 2025-07-28 ENCOUNTER — OFFICE VISIT (OUTPATIENT)
Age: 85
End: 2025-07-28
Payer: MEDICARE

## 2025-07-28 VITALS
DIASTOLIC BLOOD PRESSURE: 62 MMHG | SYSTOLIC BLOOD PRESSURE: 120 MMHG | WEIGHT: 203 LBS | RESPIRATION RATE: 16 BRPM | OXYGEN SATURATION: 93 % | BODY MASS INDEX: 29.06 KG/M2 | HEART RATE: 78 BPM | HEIGHT: 70 IN

## 2025-07-28 DIAGNOSIS — I48.91 NEW ONSET A-FIB (HCC): ICD-10-CM

## 2025-07-28 DIAGNOSIS — I48.91 NEW ONSET A-FIB (HCC): Primary | ICD-10-CM

## 2025-07-28 DIAGNOSIS — I10 PRIMARY HYPERTENSION: ICD-10-CM

## 2025-07-28 DIAGNOSIS — R00.1 BRADYCARDIA: ICD-10-CM

## 2025-07-28 PROCEDURE — 93248 EXT ECG>7D<15D REV&INTERPJ: CPT | Performed by: SPECIALIST

## 2025-07-28 PROCEDURE — 93010 ELECTROCARDIOGRAM REPORT: CPT | Performed by: SPECIALIST

## 2025-07-28 PROCEDURE — G8417 CALC BMI ABV UP PARAM F/U: HCPCS | Performed by: SPECIALIST

## 2025-07-28 PROCEDURE — G8427 DOCREV CUR MEDS BY ELIG CLIN: HCPCS | Performed by: SPECIALIST

## 2025-07-28 PROCEDURE — 1090F PRES/ABSN URINE INCON ASSESS: CPT | Performed by: SPECIALIST

## 2025-07-28 PROCEDURE — 99214 OFFICE O/P EST MOD 30 MIN: CPT | Performed by: SPECIALIST

## 2025-07-28 PROCEDURE — 93005 ELECTROCARDIOGRAM TRACING: CPT | Performed by: SPECIALIST

## 2025-07-28 PROCEDURE — 93246 EXT ECG>7D<15D RECORDING: CPT | Performed by: SPECIALIST

## 2025-07-28 PROCEDURE — 99204 OFFICE O/P NEW MOD 45 MIN: CPT | Performed by: SPECIALIST

## 2025-07-28 ASSESSMENT — PATIENT HEALTH QUESTIONNAIRE - PHQ9
1. LITTLE INTEREST OR PLEASURE IN DOING THINGS: NOT AT ALL
SUM OF ALL RESPONSES TO PHQ QUESTIONS 1-9: 0
2. FEELING DOWN, DEPRESSED OR HOPELESS: NOT AT ALL

## 2025-07-28 NOTE — PROGRESS NOTES
Edmond Kiran     1940       Edward Grant MD, [facility]  Date of Visit-7/28/2025   PCP is Soco Fox MD   [facility]  [facility]  HPI:  Emdond Kiran is a 85 y.o. 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  Has dyslipidemia.    ER visit 5/29/2025 was at physical therapy with a blood pressure 180/90 status post TKR  EKG suggested atrial fibrillation with a competing junctional pacemaker chemistry was notable for sodium 137 potassium 3.9 troponin was less than 4 CBC normal    Patient comes to the [facility] and is noted to be in atrial fibrillation on today's EKG.  Heart rate is controlled at 78.  She does not feel the A-fib in the send she does not have chest pain palpitations or tachycardia.  She denies shortness of breath  She does report some lower extremity swelling which has been going for several months.       Hospitalized 3/27/2022 for bradycardia junctional rhythm 3-second pauses stop beta-blocker and avoid clonidine.  Office visit 5/4/2022 stopped chlorthalidone      Assessment/Plan:     Patient Instructions   Start Eliquis 5mg twice a day and stop Aspirin.    You have been scheduled for an echo in our office.    We will see you back in about 4 weeks.          1. New onset a-fib (HCC)  She was in A-fib in the ER 5/29/2025 and is in A-fib today.  She does not feel it.  She is at moderate elevated risk based on age and hypertension.  I suggested she start Eliquis 5 mg twice daily  Her rate is controlled below 80 and she has had previous bradycardia so do not plan on adding rate control medication.  I do not think she definitely needs to be converted to sinus rhythm unless we see an abnormal EF on the echo or other clinical signs of decompensation.  Start Eliquis 5 mg twice daily  Get echocardiogram  Get Holter 14-day  Follow-up in 1 month to review    2. Primary hypertension  Primary hypertension elevated episode went

## 2025-07-28 NOTE — PATIENT INSTRUCTIONS
Start Eliquis 5mg twice a day and stop Aspirin.    You have been scheduled for an echo in our office.    We will see you back in about 4 weeks.

## 2025-07-28 NOTE — PROGRESS NOTES
1. Have you been to the ER, urgent care clinic since your last visit?  Hospitalized since your last visit?No    2. Have you seen or consulted any other health care providers outside of the [facility] since your last visit?  Include any pap smears or colon screening. No

## 2025-07-29 DIAGNOSIS — K59.03 DRUG-INDUCED CONSTIPATION: ICD-10-CM

## 2025-07-29 DIAGNOSIS — J30.89 SEASONAL ALLERGIC RHINITIS DUE TO OTHER ALLERGIC TRIGGER: ICD-10-CM

## 2025-07-29 RX ORDER — FLUTICASONE PROPIONATE 50 MCG
SPRAY, SUSPENSION (ML) NASAL
Qty: 16 ML | Refills: 0 | Status: SHIPPED | OUTPATIENT
Start: 2025-07-29

## 2025-07-29 RX ORDER — SENNOSIDES AND DOCUSATE SODIUM 8.6; 5 MG/1; MG/1
2 TABLET ORAL DAILY
Qty: 60 TABLET | Refills: 0 | Status: SHIPPED | OUTPATIENT
Start: 2025-07-29

## 2025-08-07 DIAGNOSIS — I15.9 SECONDARY HYPERTENSION: ICD-10-CM

## 2025-08-07 RX ORDER — AMLODIPINE BESYLATE 10 MG/1
10 TABLET ORAL DAILY
Qty: 90 TABLET | Refills: 0 | Status: SHIPPED | OUTPATIENT
Start: 2025-08-07

## 2025-08-14 ENCOUNTER — ANCILLARY PROCEDURE (OUTPATIENT)
Age: 85
End: 2025-08-14
Payer: MEDICARE

## 2025-08-14 VITALS — HEIGHT: 70 IN | BODY MASS INDEX: 29.06 KG/M2 | WEIGHT: 203 LBS

## 2025-08-14 DIAGNOSIS — I48.91 NEW ONSET A-FIB (HCC): ICD-10-CM

## 2025-08-14 LAB
ECHO AO ASC DIAM: 2.5 CM
ECHO AO ASCENDING AORTA INDEX: 1.19 CM/M2
ECHO AO ROOT DIAM: 2.8 CM
ECHO AO ROOT INDEX: 1.33 CM/M2
ECHO AV AREA PEAK VELOCITY: 1.7 CM2
ECHO AV AREA VTI: 1.9 CM2
ECHO AV AREA/BSA PEAK VELOCITY: 0.8 CM2/M2
ECHO AV AREA/BSA VTI: 0.9 CM2/M2
ECHO AV MEAN GRADIENT: 3 MMHG
ECHO AV MEAN VELOCITY: 0.8 M/S
ECHO AV PEAK GRADIENT: 5 MMHG
ECHO AV PEAK VELOCITY: 1.1 M/S
ECHO AV VELOCITY RATIO: 0.55
ECHO AV VTI: 20 CM
ECHO BSA: 2.13 M2
ECHO EST RA PRESSURE: 3 MMHG
ECHO LA DIAMETER INDEX: 1.86 CM/M2
ECHO LA DIAMETER: 3.9 CM
ECHO LA TO AORTIC ROOT RATIO: 1.39
ECHO LA VOL A-L A2C: 79 ML (ref 22–52)
ECHO LA VOL A-L A4C: 127 ML (ref 22–52)
ECHO LA VOL BP: 102 ML (ref 22–52)
ECHO LA VOL MOD A2C: 77 ML (ref 22–52)
ECHO LA VOL MOD A4C: 114 ML (ref 22–52)
ECHO LA VOL/BSA BIPLANE: 49 ML/M2 (ref 16–34)
ECHO LA VOLUME AREA LENGTH: 110 ML
ECHO LA VOLUME INDEX A-L A2C: 38 ML/M2 (ref 16–34)
ECHO LA VOLUME INDEX A-L A4C: 60 ML/M2 (ref 16–34)
ECHO LA VOLUME INDEX AREA LENGTH: 52 ML/M2 (ref 16–34)
ECHO LA VOLUME INDEX MOD A2C: 37 ML/M2 (ref 16–34)
ECHO LA VOLUME INDEX MOD A4C: 54 ML/M2 (ref 16–34)
ECHO LV EDV A2C: 52 ML
ECHO LV EDV A4C: 66 ML
ECHO LV EDV BP: 62 ML (ref 56–104)
ECHO LV EDV INDEX A4C: 31 ML/M2
ECHO LV EDV INDEX BP: 30 ML/M2
ECHO LV EDV NDEX A2C: 25 ML/M2
ECHO LV EJECTION FRACTION A2C: 48 %
ECHO LV EJECTION FRACTION A4C: 50 %
ECHO LV EJECTION FRACTION BIPLANE: 47 % (ref 55–100)
ECHO LV ESV A2C: 27 ML
ECHO LV ESV A4C: 33 ML
ECHO LV ESV BP: 33 ML (ref 19–49)
ECHO LV ESV INDEX A2C: 13 ML/M2
ECHO LV ESV INDEX A4C: 16 ML/M2
ECHO LV ESV INDEX BP: 16 ML/M2
ECHO LV FRACTIONAL SHORTENING: 43 % (ref 28–44)
ECHO LV INTERNAL DIMENSION DIASTOLE INDEX: 2.52 CM/M2
ECHO LV INTERNAL DIMENSION DIASTOLIC: 5.3 CM (ref 3.9–5.3)
ECHO LV INTERNAL DIMENSION SYSTOLIC INDEX: 1.43 CM/M2
ECHO LV INTERNAL DIMENSION SYSTOLIC: 3 CM
ECHO LV IVSD: 1 CM (ref 0.6–0.9)
ECHO LV MASS 2D: 256.6 G (ref 67–162)
ECHO LV MASS INDEX 2D: 122.2 G/M2 (ref 43–95)
ECHO LV POSTERIOR WALL DIASTOLIC: 1.4 CM (ref 0.6–0.9)
ECHO LV RELATIVE WALL THICKNESS RATIO: 0.53
ECHO LVOT AREA: 2.8 CM2
ECHO LVOT AV VTI INDEX: 0.67
ECHO LVOT DIAM: 1.9 CM
ECHO LVOT MEAN GRADIENT: 1 MMHG
ECHO LVOT PEAK GRADIENT: 2 MMHG
ECHO LVOT PEAK VELOCITY: 0.6 M/S
ECHO LVOT STROKE VOLUME INDEX: 17.9 ML/M2
ECHO LVOT SV: 37.7 ML
ECHO LVOT VTI: 13.3 CM
ECHO PV MAX VELOCITY: 1 M/S
ECHO PV PEAK GRADIENT: 4 MMHG
ECHO RIGHT VENTRICULAR SYSTOLIC PRESSURE (RVSP): 41 MMHG
ECHO RV TAPSE: 1.6 CM (ref 1.7–?)
ECHO TV REGURGITANT MAX VELOCITY: 3.09 M/S
ECHO TV REGURGITANT PEAK GRADIENT: 38 MMHG

## 2025-08-14 PROCEDURE — 93306 TTE W/DOPPLER COMPLETE: CPT

## 2025-08-16 DIAGNOSIS — J30.9 ALLERGIC RHINITIS, UNSPECIFIED SEASONALITY, UNSPECIFIED TRIGGER: ICD-10-CM

## 2025-08-16 RX ORDER — LEVOCETIRIZINE DIHYDROCHLORIDE 5 MG/1
5 TABLET, FILM COATED ORAL DAILY
Qty: 90 TABLET | Refills: 0 | Status: SHIPPED | OUTPATIENT
Start: 2025-08-16

## 2025-08-18 DIAGNOSIS — F51.5 NIGHTMARE DISORDER: ICD-10-CM

## 2025-08-18 DIAGNOSIS — I10 PRIMARY HYPERTENSION: ICD-10-CM

## 2025-08-18 RX ORDER — PRAZOSIN HYDROCHLORIDE 2 MG/1
2 CAPSULE ORAL NIGHTLY
Qty: 90 CAPSULE | Refills: 0 | Status: SHIPPED | OUTPATIENT
Start: 2025-08-18 | End: 2025-11-16

## 2025-08-26 ENCOUNTER — TELEPHONE (OUTPATIENT)
Age: 85
End: 2025-08-26

## 2025-08-27 ENCOUNTER — TELEPHONE (OUTPATIENT)
Age: 85
End: 2025-08-27

## 2025-08-29 LAB
ECHO AO ASC DIAM: 2.5 CM
ECHO AO ASCENDING AORTA INDEX: 1.19 CM/M2
ECHO AO ROOT DIAM: 2.8 CM
ECHO AO ROOT INDEX: 1.33 CM/M2
ECHO AV AREA PEAK VELOCITY: 1.7 CM2
ECHO AV AREA VTI: 1.9 CM2
ECHO AV AREA/BSA PEAK VELOCITY: 0.8 CM2/M2
ECHO AV AREA/BSA VTI: 0.9 CM2/M2
ECHO AV MEAN GRADIENT: 3 MMHG
ECHO AV MEAN VELOCITY: 0.8 M/S
ECHO AV PEAK GRADIENT: 5 MMHG
ECHO AV PEAK VELOCITY: 1.1 M/S
ECHO AV VELOCITY RATIO: 0.55
ECHO AV VTI: 20 CM
ECHO BSA: 2.13 M2
ECHO EST RA PRESSURE: 3 MMHG
ECHO LA DIAMETER INDEX: 1.86 CM/M2
ECHO LA DIAMETER: 3.9 CM
ECHO LA TO AORTIC ROOT RATIO: 1.39
ECHO LA VOL A-L A2C: 79 ML (ref 22–52)
ECHO LA VOL A-L A4C: 127 ML (ref 22–52)
ECHO LA VOL BP: 102 ML (ref 22–52)
ECHO LA VOL MOD A2C: 77 ML (ref 22–52)
ECHO LA VOL MOD A4C: 114 ML (ref 22–52)
ECHO LA VOL/BSA BIPLANE: 49 ML/M2 (ref 16–34)
ECHO LA VOLUME AREA LENGTH: 110 ML
ECHO LA VOLUME INDEX A-L A2C: 38 ML/M2 (ref 16–34)
ECHO LA VOLUME INDEX A-L A4C: 60 ML/M2 (ref 16–34)
ECHO LA VOLUME INDEX AREA LENGTH: 52 ML/M2 (ref 16–34)
ECHO LA VOLUME INDEX MOD A2C: 37 ML/M2 (ref 16–34)
ECHO LA VOLUME INDEX MOD A4C: 54 ML/M2 (ref 16–34)
ECHO LV EDV A2C: 52 ML
ECHO LV EDV A4C: 66 ML
ECHO LV EDV BP: 62 ML (ref 56–104)
ECHO LV EDV INDEX A4C: 31 ML/M2
ECHO LV EDV INDEX BP: 30 ML/M2
ECHO LV EDV NDEX A2C: 25 ML/M2
ECHO LV EF PHYSICIAN: 55 %
ECHO LV EJECTION FRACTION A2C: 48 %
ECHO LV EJECTION FRACTION A4C: 50 %
ECHO LV ESV A2C: 27 ML
ECHO LV ESV A4C: 33 ML
ECHO LV ESV BP: 33 ML (ref 19–49)
ECHO LV ESV INDEX A2C: 13 ML/M2
ECHO LV ESV INDEX A4C: 16 ML/M2
ECHO LV ESV INDEX BP: 16 ML/M2
ECHO LV FRACTIONAL SHORTENING: 43 % (ref 28–44)
ECHO LV INTERNAL DIMENSION DIASTOLE INDEX: 2.52 CM/M2
ECHO LV INTERNAL DIMENSION DIASTOLIC: 5.3 CM (ref 3.9–5.3)
ECHO LV INTERNAL DIMENSION SYSTOLIC INDEX: 1.43 CM/M2
ECHO LV INTERNAL DIMENSION SYSTOLIC: 3 CM
ECHO LV IVSD: 1 CM (ref 0.6–0.9)
ECHO LV MASS 2D: 174.5 G (ref 67–162)
ECHO LV MASS INDEX 2D: 83.1 G/M2 (ref 43–95)
ECHO LV POSTERIOR WALL DIASTOLIC: 0.8 CM (ref 0.6–0.9)
ECHO LV RELATIVE WALL THICKNESS RATIO: 0.3
ECHO LVOT AREA: 2.8 CM2
ECHO LVOT AV VTI INDEX: 0.67
ECHO LVOT DIAM: 1.9 CM
ECHO LVOT MEAN GRADIENT: 1 MMHG
ECHO LVOT PEAK GRADIENT: 2 MMHG
ECHO LVOT PEAK VELOCITY: 0.6 M/S
ECHO LVOT STROKE VOLUME INDEX: 17.9 ML/M2
ECHO LVOT SV: 37.7 ML
ECHO LVOT VTI: 13.3 CM
ECHO PV MAX VELOCITY: 1 M/S
ECHO PV PEAK GRADIENT: 4 MMHG
ECHO RIGHT VENTRICULAR SYSTOLIC PRESSURE (RVSP): 41 MMHG
ECHO RV TAPSE: 1.6 CM (ref 1.7–?)
ECHO TV REGURGITANT MAX VELOCITY: 3.09 M/S
ECHO TV REGURGITANT PEAK GRADIENT: 38 MMHG

## 2025-09-02 LAB — ECHO BSA: 2.13 M2
